# Patient Record
Sex: FEMALE | Race: OTHER | Employment: PART TIME | URBAN - METROPOLITAN AREA
[De-identification: names, ages, dates, MRNs, and addresses within clinical notes are randomized per-mention and may not be internally consistent; named-entity substitution may affect disease eponyms.]

---

## 2021-04-12 ENCOUNTER — OFFICE VISIT (OUTPATIENT)
Dept: FAMILY MEDICINE CLINIC | Age: 26
End: 2021-04-12
Payer: COMMERCIAL

## 2021-04-12 VITALS
TEMPERATURE: 98.1 F | SYSTOLIC BLOOD PRESSURE: 132 MMHG | DIASTOLIC BLOOD PRESSURE: 76 MMHG | RESPIRATION RATE: 16 BRPM | HEART RATE: 94 BPM | BODY MASS INDEX: 50.02 KG/M2 | WEIGHT: 293 LBS | HEIGHT: 64 IN | OXYGEN SATURATION: 96 %

## 2021-04-12 DIAGNOSIS — M25.571 RIGHT ANKLE PAIN, UNSPECIFIED CHRONICITY: ICD-10-CM

## 2021-04-12 DIAGNOSIS — J30.9 ALLERGIC RHINITIS, UNSPECIFIED SEASONALITY, UNSPECIFIED TRIGGER: ICD-10-CM

## 2021-04-12 DIAGNOSIS — J45.909 UNCOMPLICATED ASTHMA, UNSPECIFIED ASTHMA SEVERITY, UNSPECIFIED WHETHER PERSISTENT: ICD-10-CM

## 2021-04-12 DIAGNOSIS — G43.909 MIGRAINE WITHOUT STATUS MIGRAINOSUS, NOT INTRACTABLE, UNSPECIFIED MIGRAINE TYPE: ICD-10-CM

## 2021-04-12 DIAGNOSIS — E28.2 PCOS (POLYCYSTIC OVARIAN SYNDROME): ICD-10-CM

## 2021-04-12 DIAGNOSIS — Z00.00 ROUTINE HEALTH MAINTENANCE: Primary | ICD-10-CM

## 2021-04-12 PROCEDURE — 99204 OFFICE O/P NEW MOD 45 MIN: CPT | Performed by: STUDENT IN AN ORGANIZED HEALTH CARE EDUCATION/TRAINING PROGRAM

## 2021-04-12 PROCEDURE — G8427 DOCREV CUR MEDS BY ELIG CLIN: HCPCS | Performed by: STUDENT IN AN ORGANIZED HEALTH CARE EDUCATION/TRAINING PROGRAM

## 2021-04-12 PROCEDURE — 1036F TOBACCO NON-USER: CPT | Performed by: STUDENT IN AN ORGANIZED HEALTH CARE EDUCATION/TRAINING PROGRAM

## 2021-04-12 PROCEDURE — G8417 CALC BMI ABV UP PARAM F/U: HCPCS | Performed by: STUDENT IN AN ORGANIZED HEALTH CARE EDUCATION/TRAINING PROGRAM

## 2021-04-12 RX ORDER — TOPIRAMATE 25 MG/1
25 TABLET ORAL NIGHTLY
Qty: 60 TABLET | Refills: 3 | Status: SHIPPED
Start: 2021-04-12 | End: 2021-06-14 | Stop reason: SDUPTHER

## 2021-04-12 RX ORDER — FLUTICASONE PROPIONATE 50 MCG
2 SPRAY, SUSPENSION (ML) NASAL DAILY
Qty: 3 BOTTLE | Refills: 1 | Status: SHIPPED
Start: 2021-04-12 | End: 2022-03-14

## 2021-04-12 RX ORDER — NORETHINDRONE ACETATE AND ETHINYL ESTRADIOL 1MG-20(21)
1 KIT ORAL DAILY
Qty: 1 PACKET | Refills: 3 | Status: SHIPPED
Start: 2021-04-12 | End: 2021-11-08

## 2021-04-12 SDOH — HEALTH STABILITY: MENTAL HEALTH: HOW MANY STANDARD DRINKS CONTAINING ALCOHOL DO YOU HAVE ON A TYPICAL DAY?: NOT ASKED

## 2021-04-12 ASSESSMENT — ENCOUNTER SYMPTOMS
ABDOMINAL PAIN: 0
SORE THROAT: 0
BACK PAIN: 0
EYE REDNESS: 0
NAUSEA: 0
DIARRHEA: 0
RHINORRHEA: 0
COUGH: 0
SINUS PRESSURE: 0
VOMITING: 0
SINUS PAIN: 0
CONSTIPATION: 0
SHORTNESS OF BREATH: 0
EYE PAIN: 0

## 2021-04-12 ASSESSMENT — PATIENT HEALTH QUESTIONNAIRE - PHQ9
6. FEELING BAD ABOUT YOURSELF - OR THAT YOU ARE A FAILURE OR HAVE LET YOURSELF OR YOUR FAMILY DOWN: 2
1. LITTLE INTEREST OR PLEASURE IN DOING THINGS: 2
9. THOUGHTS THAT YOU WOULD BE BETTER OFF DEAD, OR OF HURTING YOURSELF: 0
SUM OF ALL RESPONSES TO PHQ QUESTIONS 1-9: 16
5. POOR APPETITE OR OVEREATING: 2
SUM OF ALL RESPONSES TO PHQ9 QUESTIONS 1 & 2: 4
2. FEELING DOWN, DEPRESSED OR HOPELESS: 2
4. FEELING TIRED OR HAVING LITTLE ENERGY: 3

## 2021-04-12 ASSESSMENT — COLUMBIA-SUICIDE SEVERITY RATING SCALE - C-SSRS
6. HAVE YOU EVER DONE ANYTHING, STARTED TO DO ANYTHING, OR PREPARED TO DO ANYTHING TO END YOUR LIFE?: NO
2. HAVE YOU ACTUALLY HAD ANY THOUGHTS OF KILLING YOURSELF?: NO

## 2021-04-12 NOTE — PROGRESS NOTES
2021    Jose R Allan (:  1995) is a 31 y.o. female, here for evaluation of the following medical concerns:    HPI  Chief Complaint   Patient presents with    New Patient     last period 10/2020 to last of March without period    Migraine    Asthma    Joint Swelling     right ankle, hard to put weight on      Headache  Patient presents for evaluation of headache. Symptoms began about several years ago. Generally, the headaches last about a few days and occur 2-3 times a month migraines but gets headaches everyday. The headaches do not seem to be related to any time of the day. The headaches are usually pounding, sharp, squeezing and throbbing and are located in feels it in her face and sometimes all around her head. The patient rates her most severe headaches a 10 on a scale from 1 to 10. Recently, the headaches have been increasing in both severity and frequency. Work attendance or other daily activities are affected by the headaches. Precipitating factors include: light and stress. The headaches are usually preceded by an aura consisting of visual field loss. Associated neurologic symptoms: decreased physical activity, dizziness, speech difficulties, vision problems and worsening school/work performance. The patient denies numbness of extremities. Home treatment has included darkening the room, resting and sleeping with little improvement. Other history includes: migraine headaches diagnosed in the past. Patient was on cymbalta and amitriptyline. She states that she feels better without the cymbalta. She was told her next step was topamax. Will start the medication. Ankle Pain  Patient complains of right ankle pain. Onset of the symptoms was several months ago. Inciting event: none known. Current symptoms include: ability to bear weight, but with some pain, pain with inversion of the foot and swelling.  Aggravating factors: direct pressure, going up and down stairs, standing, walking and weight bearing. Symptoms have been random and cannot predict a pattern. Patient has had prior ankle problems. Previous visits for this problem: none. Evaluation to date: none. Treatment to date: avoidance of offending activity and sock with a brace. She has issues with tendonitis in her arch. She had insoles for her shoes but it does not feel the same. She cannot put weight on it. Patient had a period for 7 months. Was put on birth control. Told she has PCOS and she ran out of birth control due to lack of insurance. We can prescribe this. Last period was 2 weeks ago. It last longer than usual but it has stopped. Patient has asthma and would like to be worked up for this. Had been told in the past to use albuterol everyday but discussed this is not how we use these inhalers. She had a different inhaler but unsure the name, she will let us know what it is if she can find it. Review of Systems   Constitutional: Negative for chills, fatigue and fever. HENT: Negative for congestion, ear pain, postnasal drip, rhinorrhea, sinus pressure, sinus pain and sore throat. Eyes: Negative for pain and redness. Respiratory: Negative for cough and shortness of breath. Cardiovascular: Negative for chest pain. Gastrointestinal: Negative for abdominal pain, constipation, diarrhea, nausea and vomiting. Genitourinary: Negative for difficulty urinating and dysuria. Musculoskeletal: Positive for arthralgias and joint swelling. Negative for back pain, myalgias and neck pain. Skin: Negative for rash. Neurological: Positive for headaches. Negative for dizziness, light-headedness and numbness. Psychiatric/Behavioral: The patient is not nervous/anxious. Prior to Visit Medications    Medication Sig Taking?  Authorizing Provider   topiramate (TOPAMAX) 25 MG tablet Take 1 tablet by mouth nightly Yes Janette Escalante,    norethindrone-ethinyl estradiol (LOESTRIN FE 1/20) 1-20 MG-MCG per tablet Take 1 tablet by mouth daily Yes Janette Escalante DO   fluticasone (FLONASE) 50 MCG/ACT nasal spray 2 sprays by Each Nostril route daily Yes Janette Escalante DO        Allergies   Allergen Reactions    Bee Venom Swelling       History reviewed. No pertinent past medical history. History reviewed. No pertinent surgical history. Social History     Socioeconomic History    Marital status: Single     Spouse name: Not on file    Number of children: Not on file    Years of education: Not on file    Highest education level: Not on file   Occupational History    Not on file   Social Needs    Financial resource strain: Not on file    Food insecurity     Worry: Not on file     Inability: Not on file    Transportation needs     Medical: Not on file     Non-medical: Not on file   Tobacco Use    Smoking status: Never Smoker    Smokeless tobacco: Never Used   Substance and Sexual Activity    Alcohol use: Yes    Drug use: Never    Sexual activity: Not on file   Lifestyle    Physical activity     Days per week: Not on file     Minutes per session: Not on file    Stress: Not on file   Relationships    Social connections     Talks on phone: Not on file     Gets together: Not on file     Attends Catholic service: Not on file     Active member of club or organization: Not on file     Attends meetings of clubs or organizations: Not on file     Relationship status: Not on file    Intimate partner violence     Fear of current or ex partner: Not on file     Emotionally abused: Not on file     Physically abused: Not on file     Forced sexual activity: Not on file   Other Topics Concern    Not on file   Social History Narrative    Not on file        History reviewed. No pertinent family history.         Vitals:    04/12/21 1259   BP: 132/76   Pulse: 94   Resp: 16   Temp: 98.1 °F (36.7 °C)   TempSrc: Oral   SpO2: 96%   Weight: (!) 304 lb 3.2 oz (138 kg)   Height: 5' 3.5\" (1.613 m)     Estimated body mass index is 53.04 kg/m² as calculated from the following:    Height as of this encounter: 5' 3.5\" (1.613 m). Weight as of this encounter: 304 lb 3.2 oz (138 kg). Physical Exam  Vitals signs and nursing note reviewed. Constitutional:       Appearance: Normal appearance. HENT:      Head: Normocephalic and atraumatic. Right Ear: Tympanic membrane, ear canal and external ear normal.      Left Ear: Tympanic membrane, ear canal and external ear normal.      Nose: Nose normal.      Mouth/Throat:      Mouth: Mucous membranes are moist.      Pharynx: Oropharynx is clear. Eyes:      Extraocular Movements: Extraocular movements intact. Conjunctiva/sclera: Conjunctivae normal.      Pupils: Pupils are equal, round, and reactive to light. Neck:      Musculoskeletal: Normal range of motion and neck supple. Cardiovascular:      Rate and Rhythm: Normal rate and regular rhythm. Pulses: Normal pulses. Heart sounds: Normal heart sounds. Pulmonary:      Effort: Pulmonary effort is normal.      Breath sounds: Normal breath sounds. Abdominal:      General: Bowel sounds are normal.      Palpations: Abdomen is soft. Musculoskeletal: Normal range of motion. General: Swelling (right ankle medial side) present. Skin:     General: Skin is warm and dry. Capillary Refill: Capillary refill takes less than 2 seconds. Neurological:      General: No focal deficit present. Mental Status: She is alert and oriented to person, place, and time. Psychiatric:         Mood and Affect: Mood normal.         Behavior: Behavior normal.         Thought Content: Thought content normal.         ASSESSMENT/PLAN:  Samira was seen today for new patient, migraine, asthma and joint swelling. Diagnoses and all orders for this visit:    Routine health maintenance    Right ankle pain, unspecified chronicity  -     XR ANKLE RIGHT (2 VIEWS);  Future    Migraine without status migrainosus, not intractable, unspecified migraine 2 weeks (around 4/26/2021) for f/u ankle and migraines. An  electronic signature was used to authenticate this note.     --Read DO Gillian on 4/12/2021 at 1:33 PM

## 2021-04-30 ENCOUNTER — APPOINTMENT (OUTPATIENT)
Dept: GENERAL RADIOLOGY | Age: 26
End: 2021-04-30
Payer: COMMERCIAL

## 2021-04-30 ENCOUNTER — HOSPITAL ENCOUNTER (EMERGENCY)
Age: 26
Discharge: HOME | End: 2021-04-30
Attending: EMERGENCY MEDICINE
Payer: COMMERCIAL

## 2021-04-30 VITALS
TEMPERATURE: 97.7 F | HEART RATE: 81 BPM | HEIGHT: 64 IN | SYSTOLIC BLOOD PRESSURE: 141 MMHG | OXYGEN SATURATION: 96 % | WEIGHT: 293 LBS | BODY MASS INDEX: 50.02 KG/M2 | RESPIRATION RATE: 17 BRPM | DIASTOLIC BLOOD PRESSURE: 79 MMHG

## 2021-04-30 DIAGNOSIS — M79.10 MYALGIA: ICD-10-CM

## 2021-04-30 DIAGNOSIS — R06.02 SHORTNESS OF BREATH: Primary | ICD-10-CM

## 2021-04-30 DIAGNOSIS — R53.83 FATIGUE, UNSPECIFIED TYPE: ICD-10-CM

## 2021-04-30 DIAGNOSIS — T50.Z95A ADVERSE EFFECT OF VACCINE, INITIAL ENCOUNTER: ICD-10-CM

## 2021-04-30 LAB
ALBUMIN SERPL-MCNC: 4.4 G/DL (ref 3.5–5.2)
ALP BLD-CCNC: 91 U/L (ref 35–104)
ALT SERPL-CCNC: 45 U/L (ref 0–32)
ANION GAP SERPL CALCULATED.3IONS-SCNC: 10 MMOL/L (ref 7–16)
AST SERPL-CCNC: 28 U/L (ref 0–31)
BASOPHILS ABSOLUTE: 0.05 E9/L (ref 0–0.2)
BASOPHILS RELATIVE PERCENT: 0.5 % (ref 0–2)
BILIRUB SERPL-MCNC: <0.2 MG/DL (ref 0–1.2)
BUN BLDV-MCNC: 11 MG/DL (ref 6–20)
CALCIUM SERPL-MCNC: 9.1 MG/DL (ref 8.6–10.2)
CHLORIDE BLD-SCNC: 102 MMOL/L (ref 98–107)
CO2: 25 MMOL/L (ref 22–29)
CREAT SERPL-MCNC: 0.8 MG/DL (ref 0.5–1)
D DIMER: <200 NG/ML DDU
EOSINOPHILS ABSOLUTE: 0.18 E9/L (ref 0.05–0.5)
EOSINOPHILS RELATIVE PERCENT: 1.8 % (ref 0–6)
GFR AFRICAN AMERICAN: >60
GFR NON-AFRICAN AMERICAN: >60 ML/MIN/1.73
GLUCOSE BLD-MCNC: 153 MG/DL (ref 74–99)
HCG, URINE, POC: NEGATIVE
HCT VFR BLD CALC: 41.2 % (ref 34–48)
HEMOGLOBIN: 13.3 G/DL (ref 11.5–15.5)
IMMATURE GRANULOCYTES #: 0.03 E9/L
IMMATURE GRANULOCYTES %: 0.3 % (ref 0–5)
INR BLD: 1
LACTIC ACID: 2 MMOL/L (ref 0.5–2.2)
LYMPHOCYTES ABSOLUTE: 3.12 E9/L (ref 1.5–4)
LYMPHOCYTES RELATIVE PERCENT: 32.1 % (ref 20–42)
Lab: NORMAL
MCH RBC QN AUTO: 27.1 PG (ref 26–35)
MCHC RBC AUTO-ENTMCNC: 32.3 % (ref 32–34.5)
MCV RBC AUTO: 83.9 FL (ref 80–99.9)
MONOCYTES ABSOLUTE: 0.68 E9/L (ref 0.1–0.95)
MONOCYTES RELATIVE PERCENT: 7 % (ref 2–12)
NEGATIVE QC PASS/FAIL: NORMAL
NEUTROPHILS ABSOLUTE: 5.67 E9/L (ref 1.8–7.3)
NEUTROPHILS RELATIVE PERCENT: 58.3 % (ref 43–80)
PDW BLD-RTO: 13.6 FL (ref 11.5–15)
PLATELET # BLD: 347 E9/L (ref 130–450)
PMV BLD AUTO: 10.3 FL (ref 7–12)
POSITIVE QC PASS/FAIL: NORMAL
POTASSIUM REFLEX MAGNESIUM: 3.9 MMOL/L (ref 3.5–5)
PRO-BNP: <5 PG/ML (ref 0–125)
PROTHROMBIN TIME: 11.2 SEC (ref 9.3–12.4)
RBC # BLD: 4.91 E12/L (ref 3.5–5.5)
SARS-COV-2, NAAT: NOT DETECTED
SODIUM BLD-SCNC: 137 MMOL/L (ref 132–146)
TOTAL PROTEIN: 7.1 G/DL (ref 6.4–8.3)
TROPONIN: <0.01 NG/ML (ref 0–0.03)
WBC # BLD: 9.7 E9/L (ref 4.5–11.5)

## 2021-04-30 PROCEDURE — 85378 FIBRIN DEGRADE SEMIQUANT: CPT

## 2021-04-30 PROCEDURE — 85610 PROTHROMBIN TIME: CPT

## 2021-04-30 PROCEDURE — 99283 EMERGENCY DEPT VISIT LOW MDM: CPT

## 2021-04-30 PROCEDURE — 83605 ASSAY OF LACTIC ACID: CPT

## 2021-04-30 PROCEDURE — 84484 ASSAY OF TROPONIN QUANT: CPT

## 2021-04-30 PROCEDURE — 87635 SARS-COV-2 COVID-19 AMP PRB: CPT

## 2021-04-30 PROCEDURE — 85025 COMPLETE CBC W/AUTO DIFF WBC: CPT

## 2021-04-30 PROCEDURE — 83880 ASSAY OF NATRIURETIC PEPTIDE: CPT

## 2021-04-30 PROCEDURE — 80053 COMPREHEN METABOLIC PANEL: CPT

## 2021-04-30 PROCEDURE — 71045 X-RAY EXAM CHEST 1 VIEW: CPT

## 2021-04-30 RX ORDER — IBUPROFEN 800 MG/1
800 TABLET ORAL EVERY 8 HOURS PRN
Qty: 21 TABLET | Refills: 0 | Status: SHIPPED | OUTPATIENT
Start: 2021-04-30 | End: 2021-06-14 | Stop reason: ALTCHOICE

## 2021-04-30 RX ORDER — ALBUTEROL SULFATE 90 UG/1
2 AEROSOL, METERED RESPIRATORY (INHALATION) EVERY 4 HOURS PRN
Qty: 1 INHALER | Refills: 1 | Status: SHIPPED | OUTPATIENT
Start: 2021-04-30 | End: 2021-10-18 | Stop reason: SDUPTHER

## 2021-04-30 ASSESSMENT — ENCOUNTER SYMPTOMS
COLOR CHANGE: 0
ABDOMINAL PAIN: 0
DIARRHEA: 0
NAUSEA: 0
SINUS PAIN: 0
SHORTNESS OF BREATH: 1
SINUS PRESSURE: 0

## 2021-05-01 NOTE — ED PROVIDER NOTES
Yue Johnston is a 31 y.o. female presenting to the ED for shortness of breath, body aches, beginning today ago. The complaint has been intermittent, moderate in severity, and worsened by nothing. Patient is 50-year-old female who notes body aches fatigue some shortness of breath since having her Covid vaccine 2 days ago. She denies any diarrhea denies any loss of taste or smell denies any cough sore throat denies any nausea vomiting or diarrhea denies any abdominal pain. She does note a mild headache. She denies any chest pain back pain or flank pain. Denies any UTI symptoms. Review of Systems:   Review of Systems   Constitutional: Negative for fatigue and fever. HENT: Negative for congestion, sinus pressure, sinus pain and sneezing. Eyes: Negative for visual disturbance. Respiratory: Positive for shortness of breath. Cardiovascular: Negative for chest pain, palpitations and leg swelling. Gastrointestinal: Negative for abdominal pain, diarrhea and nausea. Endocrine: Negative for polyphagia and polyuria. Genitourinary: Negative for difficulty urinating and flank pain. Musculoskeletal: Positive for myalgias. Skin: Negative for color change and pallor. Neurological: Negative for dizziness, light-headedness and headaches. Hematological: Negative for adenopathy. Does not bruise/bleed easily. Psychiatric/Behavioral: Negative for agitation. The patient is not nervous/anxious.                  --------------------------------------------- PAST HISTORY ---------------------------------------------  Past Medical History:  has a past medical history of Asthma. Past Surgical History:  has no past surgical history on file. Social History:  reports that she has never smoked. She has never used smokeless tobacco. She reports current alcohol use. She reports that she does not use drugs. Family History: family history is not on file.      The patients home medications have been reviewed.     Allergies: Bee venom    -------------------------------------------------- RESULTS -------------------------------------------------  All laboratory and radiology results have been personally reviewed by myself   LABS:  Results for orders placed or performed during the hospital encounter of 04/30/21   COVID-19, Rapid    Specimen: Nasopharyngeal Swab   Result Value Ref Range    SARS-CoV-2, NAAT Not Detected Not Detected   CBC Auto Differential   Result Value Ref Range    WBC 9.7 4.5 - 11.5 E9/L    RBC 4.91 3.50 - 5.50 E12/L    Hemoglobin 13.3 11.5 - 15.5 g/dL    Hematocrit 41.2 34.0 - 48.0 %    MCV 83.9 80.0 - 99.9 fL    MCH 27.1 26.0 - 35.0 pg    MCHC 32.3 32.0 - 34.5 %    RDW 13.6 11.5 - 15.0 fL    Platelets 225 944 - 449 E9/L    MPV 10.3 7.0 - 12.0 fL    Neutrophils % 58.3 43.0 - 80.0 %    Immature Granulocytes % 0.3 0.0 - 5.0 %    Lymphocytes % 32.1 20.0 - 42.0 %    Monocytes % 7.0 2.0 - 12.0 %    Eosinophils % 1.8 0.0 - 6.0 %    Basophils % 0.5 0.0 - 2.0 %    Neutrophils Absolute 5.67 1.80 - 7.30 E9/L    Immature Granulocytes # 0.03 E9/L    Lymphocytes Absolute 3.12 1.50 - 4.00 E9/L    Monocytes Absolute 0.68 0.10 - 0.95 E9/L    Eosinophils Absolute 0.18 0.05 - 0.50 E9/L    Basophils Absolute 0.05 0.00 - 0.20 E9/L   Comprehensive Metabolic Panel w/ Reflex to MG   Result Value Ref Range    Sodium 137 132 - 146 mmol/L    Potassium reflex Magnesium 3.9 3.5 - 5.0 mmol/L    Chloride 102 98 - 107 mmol/L    CO2 25 22 - 29 mmol/L    Anion Gap 10 7 - 16 mmol/L    Glucose 153 (H) 74 - 99 mg/dL    BUN 11 6 - 20 mg/dL    CREATININE 0.8 0.5 - 1.0 mg/dL    GFR Non-African American >60 >=60 mL/min/1.73    GFR African American >60     Calcium 9.1 8.6 - 10.2 mg/dL    Total Protein 7.1 6.4 - 8.3 g/dL    Albumin 4.4 3.5 - 5.2 g/dL    Total Bilirubin <0.2 0.0 - 1.2 mg/dL    Alkaline Phosphatase 91 35 - 104 U/L    ALT 45 (H) 0 - 32 U/L    AST 28 0 - 31 U/L   Lactic Acid, Plasma   Result Value Ref Range    Lactic Normal range of motion and neck supple. No muscular tenderness. Cardiovascular:      Rate and Rhythm: Normal rate and regular rhythm. Pulses: Normal pulses. Heart sounds: No murmur. Pulmonary:      Effort: Pulmonary effort is normal. No accessory muscle usage or respiratory distress. Breath sounds: No decreased breath sounds, wheezing or rhonchi. Chest:      Chest wall: No tenderness. Abdominal:      General: Bowel sounds are normal.      Tenderness: There is no abdominal tenderness. There is no right CVA tenderness, left CVA tenderness or guarding. Musculoskeletal: Normal range of motion. General: No swelling or deformity. Right lower leg: She exhibits no tenderness. No edema. Left lower leg: She exhibits no tenderness. No edema. Skin:     General: Skin is warm and dry. Capillary Refill: Capillary refill takes less than 2 seconds. Neurological:      General: No focal deficit present. Mental Status: She is alert and oriented to person, place, and time. Psychiatric:         Mood and Affect: Mood normal.             ------------------------------ ED COURSE/MEDICAL DECISION MAKING----------------------  Medications - No data to display      ED COURSE:       Medical Decision Making:    D-dimer was negative. All blood work was unremarkable. Chest x-ray was clear. Patient likely having side effects from Covid vaccine. She will be discharged to follow-up with her PCP in 2 to 3 days. We discussed warning signs for when to return. Risks and benefits were discussed with patient for All medications dispensed and given in department as well prescriptions prescribed for home, . The patient elected to take the medicine. Pt instructed on warning signs and precautions for medication side effects. The patient was given warning signs for when to seek medical attention.        Counseled regarding todays diagnosis, including possible risks and complications,  especially if left uncontrolled. Counseled regarding the possible side effects, risks, benefits and alternatives to treatment; patient and/or guardian verbalizes understanding, agrees, feels comfortable with and wishes to proceed with treatment plan. Advised patient to call her primary care physician with any new medication issues, and read all Rx info from pharmacy to assure aware of all possible risks and side effects of medication before taking. I did discuss warning signs for when to return to the Emergency Room, and the patient verbalized understanding      Counseling: The emergency provider has spoken with the patient and discussed todays results, in addition to providing specific details for the plan of care and counseling regarding the diagnosis and prognosis. Questions are answered at this time and they are agreeable with the plan.      --------------------------------- IMPRESSION AND DISPOSITION ---------------------------------    IMPRESSION  1. Shortness of breath    2. Adverse effect of vaccine, initial encounter    3. Myalgia    4. Fatigue, unspecified type        DISPOSITION  Disposition: Discharge to home  Patient condition is fair      NOTE: This report was transcribed using voice recognition software.  Every effort was made to ensure accuracy; however, inadvertent computerized transcription errors may be present       Abril Stahl DO  04/30/21 7025

## 2021-06-14 ENCOUNTER — TELEPHONE (OUTPATIENT)
Dept: FAMILY MEDICINE CLINIC | Age: 26
End: 2021-06-14

## 2021-06-14 ENCOUNTER — OFFICE VISIT (OUTPATIENT)
Dept: FAMILY MEDICINE CLINIC | Age: 26
End: 2021-06-14
Payer: COMMERCIAL

## 2021-06-14 VITALS
HEIGHT: 65 IN | DIASTOLIC BLOOD PRESSURE: 72 MMHG | SYSTOLIC BLOOD PRESSURE: 110 MMHG | HEART RATE: 70 BPM | OXYGEN SATURATION: 98 % | RESPIRATION RATE: 18 BRPM | WEIGHT: 291.6 LBS | TEMPERATURE: 98.1 F | BODY MASS INDEX: 48.58 KG/M2

## 2021-06-14 DIAGNOSIS — G43.909 MIGRAINE WITHOUT STATUS MIGRAINOSUS, NOT INTRACTABLE, UNSPECIFIED MIGRAINE TYPE: ICD-10-CM

## 2021-06-14 DIAGNOSIS — R06.83 SNORING: ICD-10-CM

## 2021-06-14 DIAGNOSIS — H61.23 BILATERAL IMPACTED CERUMEN: ICD-10-CM

## 2021-06-14 DIAGNOSIS — J01.10 ACUTE NON-RECURRENT FRONTAL SINUSITIS: Primary | ICD-10-CM

## 2021-06-14 PROCEDURE — G8427 DOCREV CUR MEDS BY ELIG CLIN: HCPCS | Performed by: STUDENT IN AN ORGANIZED HEALTH CARE EDUCATION/TRAINING PROGRAM

## 2021-06-14 PROCEDURE — 1036F TOBACCO NON-USER: CPT | Performed by: STUDENT IN AN ORGANIZED HEALTH CARE EDUCATION/TRAINING PROGRAM

## 2021-06-14 PROCEDURE — 99213 OFFICE O/P EST LOW 20 MIN: CPT | Performed by: STUDENT IN AN ORGANIZED HEALTH CARE EDUCATION/TRAINING PROGRAM

## 2021-06-14 PROCEDURE — G8417 CALC BMI ABV UP PARAM F/U: HCPCS | Performed by: STUDENT IN AN ORGANIZED HEALTH CARE EDUCATION/TRAINING PROGRAM

## 2021-06-14 RX ORDER — TOPIRAMATE 25 MG/1
TABLET ORAL
Qty: 60 TABLET | Refills: 3 | Status: SHIPPED
Start: 2021-06-14 | End: 2021-10-18

## 2021-06-14 RX ORDER — BENZONATATE 200 MG/1
CAPSULE ORAL
COMMUNITY
Start: 2021-06-07 | End: 2021-08-19 | Stop reason: ALTCHOICE

## 2021-06-14 RX ORDER — ALBUTEROL SULFATE 1.25 MG/3ML
SOLUTION RESPIRATORY (INHALATION)
COMMUNITY
Start: 2021-06-07 | End: 2022-08-19 | Stop reason: SDUPTHER

## 2021-06-14 RX ORDER — AZITHROMYCIN 250 MG/1
TABLET, FILM COATED ORAL
COMMUNITY
Start: 2021-06-07 | End: 2021-06-14 | Stop reason: ALTCHOICE

## 2021-06-14 RX ORDER — METHYLPREDNISOLONE 4 MG/1
TABLET ORAL
COMMUNITY
Start: 2021-06-07 | End: 2021-08-19 | Stop reason: ALTCHOICE

## 2021-06-14 SDOH — ECONOMIC STABILITY: FOOD INSECURITY: WITHIN THE PAST 12 MONTHS, THE FOOD YOU BOUGHT JUST DIDN'T LAST AND YOU DIDN'T HAVE MONEY TO GET MORE.: NEVER TRUE

## 2021-06-14 SDOH — ECONOMIC STABILITY: FOOD INSECURITY: WITHIN THE PAST 12 MONTHS, YOU WORRIED THAT YOUR FOOD WOULD RUN OUT BEFORE YOU GOT MONEY TO BUY MORE.: NEVER TRUE

## 2021-06-14 ASSESSMENT — ENCOUNTER SYMPTOMS
BACK PAIN: 0
ABDOMINAL PAIN: 0
WHEEZING: 1
DIARRHEA: 0
RHINORRHEA: 0
SHORTNESS OF BREATH: 1
SORE THROAT: 1
NAUSEA: 0
SINUS PRESSURE: 1
COUGH: 1
CONSTIPATION: 0
EYE PAIN: 0
VOMITING: 0
SINUS PAIN: 1
EYE REDNESS: 0

## 2021-06-14 ASSESSMENT — SOCIAL DETERMINANTS OF HEALTH (SDOH): HOW HARD IS IT FOR YOU TO PAY FOR THE VERY BASICS LIKE FOOD, HOUSING, MEDICAL CARE, AND HEATING?: NOT HARD AT ALL

## 2021-06-14 NOTE — PROGRESS NOTES
DO Chani   norethindrone-ethinyl estradiol (LOESTRIN FE 1/20) 1-20 MG-MCG per tablet Take 1 tablet by mouth daily Yes Janette Escalante DO   fluticasone (FLONASE) 50 MCG/ACT nasal spray 2 sprays by Each Nostril route daily Yes Janette Escalante DO        Allergies   Allergen Reactions    Bee Venom Swelling       Past Medical History:   Diagnosis Date    Asthma        History reviewed. No pertinent surgical history. Social History     Socioeconomic History    Marital status: Single     Spouse name: Not on file    Number of children: Not on file    Years of education: Not on file    Highest education level: Not on file   Occupational History    Not on file   Tobacco Use    Smoking status: Never Smoker    Smokeless tobacco: Never Used   Substance and Sexual Activity    Alcohol use: Yes    Drug use: Never    Sexual activity: Not on file   Other Topics Concern    Not on file   Social History Narrative    Not on file     Social Determinants of Health     Financial Resource Strain: Low Risk     Difficulty of Paying Living Expenses: Not hard at all   Food Insecurity: No Food Insecurity    Worried About Running Out of Food in the Last Year: Never true    [address] in the Last Year: Never true   Transportation Needs:     Lack of Transportation (Medical):  Lack of Transportation (Non-Medical):    Physical Activity:     Days of Exercise per Week:     Minutes of Exercise per Session:    Stress:     Feeling of Stress :    Social Connections:     Frequency of Communication with Friends and Family:     Frequency of Social Gatherings with Friends and Family:     Attends Zoroastrian Services:     Active Member of Clubs or Organizations:     Attends Club or Organization Meetings:     Marital Status:    Intimate Partner Violence:     Fear of Current or Ex-Partner:     Emotionally Abused:     Physically Abused:     Sexually Abused:         History reviewed.  No pertinent family soft.   Musculoskeletal:         General: Normal range of motion. Cervical back: Normal range of motion and neck supple. Skin:     General: Skin is warm and dry. Capillary Refill: Capillary refill takes less than 2 seconds. Neurological:      General: No focal deficit present. Mental Status: She is alert and oriented to person, place, and time. Psychiatric:         Mood and Affect: Mood normal.         Behavior: Behavior normal.         Thought Content: Thought content normal.         ASSESSMENT/PLAN:  Samira was seen today for otalgia, sleep apnea, cough and nutrition counseling. Diagnoses and all orders for this visit:    Acute non-recurrent frontal sinusitis    Snoring  -     [facility]  -     Sleep Study with PAP Titration; Future    Bilateral impacted cerumen  -     carbamide peroxide (DEBROX) 6.5 % otic solution; Place 5 drops into both ears 2 times daily    Migraine without status migrainosus, not intractable, unspecified migraine type  -     topiramate (TOPAMAX) 25 MG tablet; Take 1 tablet in morning and night     will increase the topamax dose  Will get sleep study for patient   Will clean out ears at next visit     Educational materials and/or home exercises printed for patient's review and were included in patient instructions on his/her After Visit Summary and given to patient at the end of visit. Counseled regarding above diagnosis, including possible risks and complications,  especially if left uncontrolled. Counseled regarding the possible side effects, risks, benefits and alternatives to treatment; patient and/or guardian verbalizes understanding, agrees, feels comfortable with and wishes to proceed with above treatment plan. Advised patient to call with any new medication issues, and read all Rx info from pharmacy to assure aware of all possible risks and side effects of medication before taking.      Reviewed age and gender appropriate health screening exams and vaccinations. Advised patient regarding importance of keeping up with recommended health maintenance and to schedule as soon as possible if overdue, as this is important in assessing for undiagnosed pathology, especially cancer, as well as protecting against potentially harmful/life threatening disease. Patient and/or guardian verbalizes understanding and agrees with above counseling, assessment and plan. All questions answered. Return in about 1 week (around 6/21/2021) for f/u earwax and headaches. An  electronic signature was used to authenticate this note.     --Shalonda Hawkins DO on 6/14/2021 at 3:31 PM

## 2021-06-23 ENCOUNTER — TELEPHONE (OUTPATIENT)
Dept: SLEEP CENTER | Age: 26
End: 2021-06-23

## 2021-06-25 ENCOUNTER — TELEPHONE (OUTPATIENT)
Dept: SLEEP CENTER | Age: 26
End: 2021-06-25

## 2021-06-30 DIAGNOSIS — G47.33 OSA (OBSTRUCTIVE SLEEP APNEA): Primary | ICD-10-CM

## 2021-07-12 ENCOUNTER — OFFICE VISIT (OUTPATIENT)
Dept: FAMILY MEDICINE CLINIC | Age: 26
End: 2021-07-12
Payer: COMMERCIAL

## 2021-07-12 VITALS
SYSTOLIC BLOOD PRESSURE: 118 MMHG | BODY MASS INDEX: 48.82 KG/M2 | TEMPERATURE: 97.1 F | HEART RATE: 80 BPM | HEIGHT: 65 IN | RESPIRATION RATE: 16 BRPM | DIASTOLIC BLOOD PRESSURE: 86 MMHG | OXYGEN SATURATION: 97 % | WEIGHT: 293 LBS

## 2021-07-12 DIAGNOSIS — R06.83 SNORING: Primary | ICD-10-CM

## 2021-07-12 DIAGNOSIS — G47.00 INSOMNIA, UNSPECIFIED TYPE: ICD-10-CM

## 2021-07-12 PROCEDURE — G8417 CALC BMI ABV UP PARAM F/U: HCPCS | Performed by: STUDENT IN AN ORGANIZED HEALTH CARE EDUCATION/TRAINING PROGRAM

## 2021-07-12 PROCEDURE — 1036F TOBACCO NON-USER: CPT | Performed by: STUDENT IN AN ORGANIZED HEALTH CARE EDUCATION/TRAINING PROGRAM

## 2021-07-12 PROCEDURE — G8427 DOCREV CUR MEDS BY ELIG CLIN: HCPCS | Performed by: STUDENT IN AN ORGANIZED HEALTH CARE EDUCATION/TRAINING PROGRAM

## 2021-07-12 PROCEDURE — 99213 OFFICE O/P EST LOW 20 MIN: CPT | Performed by: STUDENT IN AN ORGANIZED HEALTH CARE EDUCATION/TRAINING PROGRAM

## 2021-07-12 RX ORDER — TRAZODONE HYDROCHLORIDE 50 MG/1
50 TABLET ORAL NIGHTLY
Qty: 90 TABLET | Refills: 1 | Status: SHIPPED
Start: 2021-07-12 | End: 2022-01-12

## 2021-07-12 ASSESSMENT — ENCOUNTER SYMPTOMS
NAUSEA: 0
COUGH: 0
DIARRHEA: 0
CONSTIPATION: 0
SINUS PRESSURE: 0
VOMITING: 0
EYE PAIN: 0
EYE REDNESS: 0
RHINORRHEA: 0
SORE THROAT: 0
SINUS PAIN: 0
ABDOMINAL PAIN: 0
BACK PAIN: 0
SHORTNESS OF BREATH: 0

## 2021-07-12 NOTE — PROGRESS NOTES
2021    Rafael Aponte (:  1995) is a 31 y.o. female, here for evaluation of the following medical concerns:    HPI  Chief Complaint   Patient presents with    Insomnia     Ongoing for the past couple of years. Scheduled for a sleep study in August    Ankle Pain     Pain on and off since April. She did not get the x-ray done      Patient is here for a follow up appointment because she missed her last appointment. She states that the debrox did help loosen up some of the wax. She wants us to check it and see how it is going. She spoke with the sleep study place about a week ago and they told her to discuss with me about her not being able to fall asleep. They told her this would be an issue with the sleep study that they scheduled for her. Lately she is able to sleep in because she is not working. She does not get to sleep until about 3-4 AM but can sleep in. When she wakes up she is not feeling rested and as if she did not sleep and has a headache. Her girlfriend states she snores and gasps for air. She has the sleep study scheduled for . She has tried melatonin but this does not help. Patient has gone back down to one pill on the topamax. Review of Systems   Constitutional: Negative for chills, fatigue and fever. HENT: Negative for congestion, ear pain, postnasal drip, rhinorrhea, sinus pressure, sinus pain and sore throat. Itchy ears    Eyes: Negative for pain and redness. Respiratory: Negative for cough and shortness of breath. Cardiovascular: Negative for chest pain. Gastrointestinal: Negative for abdominal pain, constipation, diarrhea, nausea and vomiting. Genitourinary: Negative for difficulty urinating and dysuria. Musculoskeletal: Positive for arthralgias. Negative for back pain, myalgias and neck pain. Skin: Negative for rash. Neurological: Negative for dizziness, light-headedness and numbness.    Psychiatric/Behavioral: Positive for sleep disturbance. The patient is not nervous/anxious. Prior to Visit Medications    Medication Sig Taking? Authorizing Provider   traZODone (DESYREL) 50 MG tablet Take 1 tablet by mouth nightly Yes Janette Escalante DO   albuterol (ACCUNEB) 1.25 MG/3ML nebulizer solution INHALE 3 ML (1 VIAL) VIA NEBULIZER EVERY 4 TO 6 HOURS AS NEEDED Yes Historical Provider, MD   carbamide peroxide (DEBROX) 6.5 % otic solution Place 5 drops into both ears 2 times daily Yes Janette Escalante DO   topiramate (TOPAMAX) 25 MG tablet Take 1 tablet in morning and night Yes Janette Escalante DO   albuterol sulfate HFA (PROVENTIL HFA) 108 (90 Base) MCG/ACT inhaler Inhale 2 puffs into the lungs every 4 hours as needed for Wheezing Yes Shree Wilde DO   norethindrone-ethinyl estradiol (LOESTRIN FE 1/20) 1-20 MG-MCG per tablet Take 1 tablet by mouth daily Yes Janette Escalante DO   fluticasone (FLONASE) 50 MCG/ACT nasal spray 2 sprays by Each Nostril route daily Yes Janette Escalante DO   benzonatate (TESSALON) 200 MG capsule TAKE ONE CAPSULE BY MOUTH THREE TIMES A DAY AS NEEDED  Patient not taking: Reported on 7/12/2021  Historical Provider, MD   methylPREDNISolone (MEDROL DOSEPACK) 4 MG tablet take for 6 days as directed  Patient not taking: Reported on 7/12/2021  Historical Provider, MD        Allergies   Allergen Reactions    Bee Venom Swelling       Past Medical History:   Diagnosis Date    Asthma        History reviewed. No pertinent surgical history. Social History     Socioeconomic History    Marital status: Single     Spouse name: Not on file    Number of children: Not on file    Years of education: Not on file    Highest education level: Not on file   Occupational History    Not on file   Tobacco Use    Smoking status: Never Smoker    Smokeless tobacco: Never Used   Substance and Sexual Activity    Alcohol use:  Yes    Drug use: Never    Sexual activity: Not on file   Other Topics Concern    Not on file   Social History Narrative    Not on file     Social Determinants of Health     Financial Resource Strain: Low Risk     Difficulty of Paying Living Expenses: Not hard at all   Food Insecurity: No Food Insecurity    Worried About Running Out of Food in the Last Year: Never true    [address] in the Last Year: Never true   Transportation Needs:     Lack of Transportation (Medical):  Lack of Transportation (Non-Medical):    Physical Activity:     Days of Exercise per Week:     Minutes of Exercise per Session:    Stress:     Feeling of Stress :    Social Connections:     Frequency of Communication with Friends and Family:     Frequency of Social Gatherings with Friends and Family:     Attends [employer] Services:     Active Member of Clubs or Organizations:     Attends Club or Organization Meetings:     Marital Status:    Intimate Partner Violence:     Fear of Current or Ex-Partner:     Emotionally Abused:     Physically Abused:     Sexually Abused:         History reviewed. No pertinent family history. Vitals:    07/12/21 1559   BP: 118/86   Pulse: 80   Resp: 16   Temp: 97.1 °F (36.2 °C)   TempSrc: Temporal   SpO2: 97%   Weight: 298 lb 12.8 oz (135.5 kg)   Height: 5' 5\" (1.651 m)     Estimated body mass index is 49.72 kg/m² as calculated from the following:    Height as of this encounter: 5' 5\" (1.651 m). Weight as of this encounter: 298 lb 12.8 oz (135.5 kg).     Most Recent Labs  CBC  Lab Results   Component Value Date    WBC 9.7 04/30/2021    RBC 4.91 04/30/2021    HGB 13.3 04/30/2021    HCT 41.2 04/30/2021    MCV 83.9 04/30/2021     04/30/2021      CMP  Lab Results   Component Value Date     04/30/2021    K 3.9 04/30/2021     04/30/2021    CO2 25 04/30/2021    ANIONGAP 10 04/30/2021    GLUCOSE 153 04/30/2021    BUN 11 04/30/2021    CREATININE 0.8 04/30/2021    LABGLOM >60 04/30/2021    GFRAA >60 04/30/2021    CALCIUM 9.1 04/30/2021    PROT 7.1 04/30/2021    LABALBU 4.4 04/30/2021    BILITOT <0.2 04/30/2021    ALKPHOS 91 04/30/2021    AST 28 04/30/2021    ALT 45 04/30/2021       Physical Exam  Vitals and nursing note reviewed. Constitutional:       Appearance: Normal appearance. She is obese. HENT:      Head: Normocephalic and atraumatic. Right Ear: Tympanic membrane, ear canal and external ear normal.      Left Ear: Tympanic membrane, ear canal and external ear normal.      Nose: Nose normal.      Mouth/Throat:      Mouth: Mucous membranes are moist.      Pharynx: Oropharynx is clear. Eyes:      Extraocular Movements: Extraocular movements intact. Conjunctiva/sclera: Conjunctivae normal.      Pupils: Pupils are equal, round, and reactive to light. Cardiovascular:      Rate and Rhythm: Normal rate and regular rhythm. Pulses: Normal pulses. Heart sounds: Normal heart sounds. Pulmonary:      Effort: Pulmonary effort is normal.      Breath sounds: Normal breath sounds. Abdominal:      General: Bowel sounds are normal.      Palpations: Abdomen is soft. Musculoskeletal:         General: Normal range of motion. Cervical back: Normal range of motion and neck supple. Skin:     General: Skin is warm and dry. Capillary Refill: Capillary refill takes less than 2 seconds. Neurological:      General: No focal deficit present. Mental Status: She is alert and oriented to person, place, and time. Psychiatric:         Mood and Affect: Mood normal.         Behavior: Behavior normal.         Thought Content: Thought content normal.         ASSESSMENT/PLAN:  Samira was seen today for insomnia and ankle pain. Diagnoses and all orders for this visit:    Snoring    Insomnia, unspecified type  -     traZODone (DESYREL) 50 MG tablet;  Take 1 tablet by mouth nightly    Patient getting a sleep study in august      Educational materials and/or home exercises printed for patient's review and were included in patient instructions on his/her After Visit

## 2021-07-14 ENCOUNTER — TELEPHONE (OUTPATIENT)
Dept: ADMINISTRATIVE | Age: 26
End: 2021-07-14

## 2021-07-14 ENCOUNTER — APPOINTMENT (OUTPATIENT)
Dept: CT IMAGING | Age: 26
End: 2021-07-14
Payer: COMMERCIAL

## 2021-07-14 ENCOUNTER — APPOINTMENT (OUTPATIENT)
Dept: GENERAL RADIOLOGY | Age: 26
End: 2021-07-14
Payer: COMMERCIAL

## 2021-07-14 ENCOUNTER — HOSPITAL ENCOUNTER (EMERGENCY)
Age: 26
Discharge: HOME | End: 2021-07-14
Attending: EMERGENCY MEDICINE
Payer: COMMERCIAL

## 2021-07-14 ENCOUNTER — NURSE TRIAGE (OUTPATIENT)
Dept: OTHER | Facility: CLINIC | Age: 26
End: 2021-07-14

## 2021-07-14 VITALS
RESPIRATION RATE: 18 BRPM | HEIGHT: 65 IN | OXYGEN SATURATION: 100 % | DIASTOLIC BLOOD PRESSURE: 68 MMHG | BODY MASS INDEX: 48.32 KG/M2 | HEART RATE: 77 BPM | WEIGHT: 290 LBS | TEMPERATURE: 98.1 F | SYSTOLIC BLOOD PRESSURE: 120 MMHG

## 2021-07-14 DIAGNOSIS — G43.809 OTHER MIGRAINE WITHOUT STATUS MIGRAINOSUS, NOT INTRACTABLE: Primary | ICD-10-CM

## 2021-07-14 LAB
ALBUMIN SERPL-MCNC: 4.5 G/DL (ref 3.5–5.2)
ALP BLD-CCNC: 98 U/L (ref 35–104)
ALT SERPL-CCNC: 54 U/L (ref 0–32)
AMORPHOUS: ABNORMAL
ANION GAP SERPL CALCULATED.3IONS-SCNC: 14 MMOL/L (ref 7–16)
AST SERPL-CCNC: 40 U/L (ref 0–31)
BACTERIA: ABNORMAL /HPF
BASOPHILS ABSOLUTE: 0.03 E9/L (ref 0–0.2)
BASOPHILS RELATIVE PERCENT: 0.4 % (ref 0–2)
BILIRUB SERPL-MCNC: 0.3 MG/DL (ref 0–1.2)
BILIRUBIN URINE: NEGATIVE
BLOOD, URINE: NEGATIVE
BUN BLDV-MCNC: 9 MG/DL (ref 6–20)
C-REACTIVE PROTEIN: 0.6 MG/DL (ref 0–0.4)
CALCIUM SERPL-MCNC: 9.9 MG/DL (ref 8.6–10.2)
CHLORIDE BLD-SCNC: 103 MMOL/L (ref 98–107)
CLARITY: CLEAR
CO2: 24 MMOL/L (ref 22–29)
COLOR: YELLOW
CREAT SERPL-MCNC: 0.8 MG/DL (ref 0.5–1)
EOSINOPHILS ABSOLUTE: 0.12 E9/L (ref 0.05–0.5)
EOSINOPHILS RELATIVE PERCENT: 1.7 % (ref 0–6)
EPITHELIAL CELLS, UA: ABNORMAL /HPF
GFR AFRICAN AMERICAN: >60
GFR NON-AFRICAN AMERICAN: >60 ML/MIN/1.73
GLUCOSE BLD-MCNC: 96 MG/DL (ref 74–99)
GLUCOSE URINE: NEGATIVE MG/DL
HCG, URINE, POC: NEGATIVE
HCT VFR BLD CALC: 44.6 % (ref 34–48)
HEMOGLOBIN: 14.4 G/DL (ref 11.5–15.5)
IMMATURE GRANULOCYTES #: 0.03 E9/L
IMMATURE GRANULOCYTES %: 0.4 % (ref 0–5)
KETONES, URINE: NEGATIVE MG/DL
LACTIC ACID, SEPSIS: 1.7 MMOL/L (ref 0.5–1.9)
LEUKOCYTE ESTERASE, URINE: ABNORMAL
LYMPHOCYTES ABSOLUTE: 2.89 E9/L (ref 1.5–4)
LYMPHOCYTES RELATIVE PERCENT: 40.3 % (ref 20–42)
Lab: NORMAL
MCH RBC QN AUTO: 27.4 PG (ref 26–35)
MCHC RBC AUTO-ENTMCNC: 32.3 % (ref 32–34.5)
MCV RBC AUTO: 84.8 FL (ref 80–99.9)
MONOCYTES ABSOLUTE: 0.47 E9/L (ref 0.1–0.95)
MONOCYTES RELATIVE PERCENT: 6.6 % (ref 2–12)
NEGATIVE QC PASS/FAIL: NORMAL
NEUTROPHILS ABSOLUTE: 3.63 E9/L (ref 1.8–7.3)
NEUTROPHILS RELATIVE PERCENT: 50.6 % (ref 43–80)
NITRITE, URINE: NEGATIVE
PDW BLD-RTO: 14 FL (ref 11.5–15)
PH UA: 7.5 (ref 5–9)
PLATELET # BLD: 385 E9/L (ref 130–450)
PMV BLD AUTO: 10.2 FL (ref 7–12)
POSITIVE QC PASS/FAIL: NORMAL
POTASSIUM REFLEX MAGNESIUM: 4.3 MMOL/L (ref 3.5–5)
PROTEIN UA: NEGATIVE MG/DL
RBC # BLD: 5.26 E12/L (ref 3.5–5.5)
RBC UA: ABNORMAL /HPF (ref 0–2)
SARS-COV-2, NAAT: NOT DETECTED
SEDIMENTATION RATE, ERYTHROCYTE: 15 MM/HR (ref 0–20)
SODIUM BLD-SCNC: 141 MMOL/L (ref 132–146)
SPECIFIC GRAVITY UA: 1.02 (ref 1–1.03)
TOTAL PROTEIN: 7.9 G/DL (ref 6.4–8.3)
UROBILINOGEN, URINE: 0.2 E.U./DL
WBC # BLD: 7.2 E9/L (ref 4.5–11.5)
WBC UA: ABNORMAL /HPF (ref 0–5)

## 2021-07-14 PROCEDURE — 96375 TX/PRO/DX INJ NEW DRUG ADDON: CPT

## 2021-07-14 PROCEDURE — 87635 SARS-COV-2 COVID-19 AMP PRB: CPT

## 2021-07-14 PROCEDURE — 86140 C-REACTIVE PROTEIN: CPT

## 2021-07-14 PROCEDURE — 85025 COMPLETE CBC W/AUTO DIFF WBC: CPT

## 2021-07-14 PROCEDURE — 83605 ASSAY OF LACTIC ACID: CPT

## 2021-07-14 PROCEDURE — 81001 URINALYSIS AUTO W/SCOPE: CPT

## 2021-07-14 PROCEDURE — 2580000003 HC RX 258: Performed by: STUDENT IN AN ORGANIZED HEALTH CARE EDUCATION/TRAINING PROGRAM

## 2021-07-14 PROCEDURE — 87040 BLOOD CULTURE FOR BACTERIA: CPT

## 2021-07-14 PROCEDURE — 6370000000 HC RX 637 (ALT 250 FOR IP): Performed by: STUDENT IN AN ORGANIZED HEALTH CARE EDUCATION/TRAINING PROGRAM

## 2021-07-14 PROCEDURE — 71045 X-RAY EXAM CHEST 1 VIEW: CPT

## 2021-07-14 PROCEDURE — 70450 CT HEAD/BRAIN W/O DYE: CPT

## 2021-07-14 PROCEDURE — 85651 RBC SED RATE NONAUTOMATED: CPT

## 2021-07-14 PROCEDURE — 96374 THER/PROPH/DIAG INJ IV PUSH: CPT

## 2021-07-14 PROCEDURE — 6360000002 HC RX W HCPCS: Performed by: STUDENT IN AN ORGANIZED HEALTH CARE EDUCATION/TRAINING PROGRAM

## 2021-07-14 PROCEDURE — 80053 COMPREHEN METABOLIC PANEL: CPT

## 2021-07-14 PROCEDURE — 99285 EMERGENCY DEPT VISIT HI MDM: CPT

## 2021-07-14 RX ORDER — ACETAMINOPHEN 500 MG
1000 TABLET ORAL ONCE
Status: COMPLETED | OUTPATIENT
Start: 2021-07-14 | End: 2021-07-14

## 2021-07-14 RX ORDER — METOCLOPRAMIDE HYDROCHLORIDE 5 MG/ML
10 INJECTION INTRAMUSCULAR; INTRAVENOUS ONCE
Status: COMPLETED | OUTPATIENT
Start: 2021-07-14 | End: 2021-07-14

## 2021-07-14 RX ORDER — DIPHENHYDRAMINE HYDROCHLORIDE 50 MG/ML
25 INJECTION INTRAMUSCULAR; INTRAVENOUS ONCE
Status: COMPLETED | OUTPATIENT
Start: 2021-07-14 | End: 2021-07-14

## 2021-07-14 RX ORDER — 0.9 % SODIUM CHLORIDE 0.9 %
1000 INTRAVENOUS SOLUTION INTRAVENOUS ONCE
Status: COMPLETED | OUTPATIENT
Start: 2021-07-14 | End: 2021-07-14

## 2021-07-14 RX ADMIN — DIPHENHYDRAMINE HYDROCHLORIDE 25 MG: 50 INJECTION, SOLUTION INTRAMUSCULAR; INTRAVENOUS at 15:09

## 2021-07-14 RX ADMIN — ACETAMINOPHEN 1000 MG: 500 TABLET ORAL at 15:07

## 2021-07-14 RX ADMIN — SODIUM CHLORIDE 1000 ML: 9 INJECTION, SOLUTION INTRAVENOUS at 15:08

## 2021-07-14 RX ADMIN — METOCLOPRAMIDE 10 MG: 5 INJECTION, SOLUTION INTRAMUSCULAR; INTRAVENOUS at 15:09

## 2021-07-14 ASSESSMENT — ENCOUNTER SYMPTOMS
ABDOMINAL DISTENTION: 0
EYE REDNESS: 0
BACK PAIN: 0
SORE THROAT: 0
EYE DISCHARGE: 0
WHEEZING: 0
PHOTOPHOBIA: 1
SINUS PRESSURE: 0
VOMITING: 0
NAUSEA: 0
EYE PAIN: 0
COUGH: 0
DIARRHEA: 0
SHORTNESS OF BREATH: 0

## 2021-07-14 ASSESSMENT — VISUAL ACUITY
OU: 20/20
OU: 20/20
OS: 20/200
OS: 20/200
OD: 20/30
OD: 20/30

## 2021-07-14 ASSESSMENT — PAIN SCALES - GENERAL: PAINLEVEL_OUTOF10: 2

## 2021-07-14 NOTE — ED PROVIDER NOTES
[facility]  Department of Emergency Medicine     Written by: Anna Ramírez DO  Patient Name: Shelly Clay  Attending Provider: Soraida Yanez DO  Admit Date: 2021  2:25 PM  MRN: [id number]                   : 1995        Chief Complaint   Patient presents with    Migraine     has blurred vision    Fever    - Chief complaint    Patient is a 29-year-old female past medical history migraines. Patient presents with chief complaint of headache. She states that symptoms began 2 days ago. She describes a headache as a generalized aching and pulling sensation. She currently rates pain a 9 out of 10. In addition she also notes severe photophobia as well as neck stiffness and nausea. Patient also notes that she has had subjective fevers at home. Patient has tried Tylenol with minimal improvement. Patient states that headache is somewhat similar to her previous headaches however this 1 appears to be lasting longer and is more severe. Patient denies any chest pain, shortness of breath abdominal pain, constipation, diarrhea, dizziness or lightheadedness. The history is provided by the patient. No  was used. Review of Systems   Constitutional: Positive for fever. Negative for chills. HENT: Negative for ear pain, sinus pressure and sore throat. Eyes: Positive for photophobia. Negative for pain, discharge and redness. Respiratory: Negative for cough, shortness of breath and wheezing. Cardiovascular: Negative for chest pain. Gastrointestinal: Negative for abdominal distention, diarrhea, nausea and vomiting. Genitourinary: Negative for dysuria and frequency. Musculoskeletal: Negative for arthralgias and back pain. Skin: Negative for rash and wound. Neurological: Positive for headaches. Negative for dizziness, seizures, weakness, light-headedness and numbness. Hematological: Negative for adenopathy.    All other systems reviewed 99.3.  On physical exam heart regular rate and rhythm, lungs clear to auscultation bilaterally, abdomen soft nontender. On neurological exam, there is no focal deficits, no nuchal rigidity noted, pupils equal round reactive to light, extraocular eye movements intact, muscle strength 5 out of 5 to bilateral upper and lower extremities, sensation intact to light touch. Knute Pro work obtained CBC unremarkable, CMP unremarkable, urinalysis not indicative of infection, lactic acid 1.7, CRP 0.6, urine pregnancy test negative, COVID-19 test was obtained and was negative. CT scan of the head was obtained contrast, chest x-ray obtained demonstrate no acute normalities. Patient given 1 L normal saline, 10 mg IV Reglan, 25 mg IV Benadryl and 1 L normal saline. With improvement in headache. Findings discussed at length with patient after shared decision making decision was made not to pursue LP due to low suspicion for bacterial meningitis at this time. Decision made to discharge patient. Patient was given strict return precautions and was instructed to follow-up with primary care doctor soon as possible. In addition if patient notes any new worrisome symptoms she was instructed to return to emergency department for evaluation. Plan of care discussed with patient including discharge, all questions were answered, patient was in agreement with plan of care and discharged home in stable condition. Amount and/or Complexity of Data Reviewed  Clinical lab tests: ordered and reviewed  Tests in the radiology section of CPT®: ordered and reviewed    Risk of Complications, Morbidity, and/or Mortality  Presenting problems: moderate  Diagnostic procedures: moderate  Management options: moderate    Patient Progress  Patient progress: stable       ED Course as of Jul 14 1717 Wed Jul 14, 2021 1712 Patient reevaluated notes improvement in headache. Discussed findings with patient.   At this time patient would not like to proceed with lumbar puncture at this time. Had shared decision-making discussion with patient that we cannot definitively rule out meningitis LP but given clinical findings as well as physical exam meningitis is of low suspicion. Decision was made to discharge patient strict return precautions. Patient was instructed to follow-up with primary care doctor and to return to emergency department if any new worrisome symptoms. [BP]      ED Course User Index  [BP] Meka Stanley DO        --------------------------------------------- PAST HISTORY ---------------------------------------------  Past Medical History:  has a past medical history of Asthma, Kidney stone, and Migraines. Past Surgical History:  has no past surgical history on file. Social History:  reports that she has never smoked. She has never used smokeless tobacco. She reports current alcohol use. She reports that she does not use drugs. Family History: family history is not on file. The patients home medications have been reviewed.     Allergies: Bee venom    -------------------------------------------------- RESULTS -------------------------------------------------  Labs:  Results for orders placed or performed during the hospital encounter of 07/14/21   COVID-19, Rapid    Specimen: Nasopharyngeal Swab   Result Value Ref Range    SARS-CoV-2, NAAT Not Detected Not Detected   CBC Auto Differential   Result Value Ref Range    WBC 7.2 4.5 - 11.5 E9/L    RBC 5.26 3.50 - 5.50 E12/L    Hemoglobin 14.4 11.5 - 15.5 g/dL    Hematocrit 44.6 34.0 - 48.0 %    MCV 84.8 80.0 - 99.9 fL    MCH 27.4 26.0 - 35.0 pg    MCHC 32.3 32.0 - 34.5 %    RDW 14.0 11.5 - 15.0 fL    Platelets 291 880 - 044 E9/L    MPV 10.2 7.0 - 12.0 fL    Neutrophils % 50.6 43.0 - 80.0 %    Immature Granulocytes % 0.4 0.0 - 5.0 %    Lymphocytes % 40.3 20.0 - 42.0 %    Monocytes % 6.6 2.0 - 12.0 %    Eosinophils % 1.7 0.0 - 6.0 %    Basophils % 0.4 0.0 - 2.0 %    Neutrophils Absolute 3.63 1.80 - 7.30 E9/L    Immature Granulocytes # 0.03 E9/L    Lymphocytes Absolute 2.89 1.50 - 4.00 E9/L    Monocytes Absolute 0.47 0.10 - 0.95 E9/L    Eosinophils Absolute 0.12 0.05 - 0.50 E9/L    Basophils Absolute 0.03 0.00 - 0.20 E9/L   Comprehensive Metabolic Panel w/ Reflex to MG   Result Value Ref Range    Sodium 141 132 - 146 mmol/L    Potassium reflex Magnesium 4.3 3.5 - 5.0 mmol/L    Chloride 103 98 - 107 mmol/L    CO2 24 22 - 29 mmol/L    Anion Gap 14 7 - 16 mmol/L    Glucose 96 74 - 99 mg/dL    BUN 9 6 - 20 mg/dL    CREATININE 0.8 0.5 - 1.0 mg/dL    GFR Non-African American >60 >=60 mL/min/1.73    GFR African American >60     Calcium 9.9 8.6 - 10.2 mg/dL    Total Protein 7.9 6.4 - 8.3 g/dL    Albumin 4.5 3.5 - 5.2 g/dL    Total Bilirubin 0.3 0.0 - 1.2 mg/dL    Alkaline Phosphatase 98 35 - 104 U/L    ALT 54 (H) 0 - 32 U/L    AST 40 (H) 0 - 31 U/L   Urinalysis, reflex to microscopic   Result Value Ref Range    Color, UA Yellow Straw/Yellow    Clarity, UA Clear Clear    Glucose, Ur Negative Negative mg/dL    Bilirubin Urine Negative Negative    Ketones, Urine Negative Negative mg/dL    Specific Gravity, UA 1.025 1.005 - 1.030    Blood, Urine Negative Negative    pH, UA 7.5 5.0 - 9.0    Protein, UA Negative Negative mg/dL    Urobilinogen, Urine 0.2 <2.0 E.U./dL    Nitrite, Urine Negative Negative    Leukocyte Esterase, Urine SMALL (A) Negative   Lactate, Sepsis   Result Value Ref Range    Lactic Acid, Sepsis 1.7 0.5 - 1.9 mmol/L   Microscopic Urinalysis   Result Value Ref Range    WBC, UA 2-5 0 - 5 /HPF    RBC, UA NONE 0 - 2 /HPF    Epithelial Cells, UA MANY /HPF    Bacteria, UA FEW (A) None Seen /HPF    Amorphous, UA MODERATE    POC Pregnancy Urine   Result Value Ref Range    HCG, Urine, POC Negative Negative    Lot Number [id number]     Positive QC Pass/Fail Pass     Negative QC Pass/Fail Pass        Radiology:  XR CHEST PORTABLE   Final Result   No acute process.          CT Head WO Contrast   Final Result   No acute intracranial abnormality.             ------------------------- NURSING NOTES AND VITALS REVIEWED ---------------------------  Date / Time Roomed:  7/14/2021  2:25 PM  ED Bed Assignment:  27/27    The nursing notes within the ED encounter and vital signs as below have been reviewed. /68   Pulse 77   Temp 98.1 °F (36.7 °C)   Resp 18   Ht 5' 5\" (1.651 m)   Wt 290 lb (131.5 kg)   SpO2 100%   BMI 48.26 kg/m²   Oxygen Saturation Interpretation: Normal      ------------------------------------------ PROGRESS NOTES ------------------------------------------  5:17 PM EDT  I have spoken with the patient and discussed todays results, in addition to providing specific details for the plan of care and counseling regarding the diagnosis and prognosis. Their questions are answered at this time and they are agreeable with the plan. I discussed at length with them reasons for immediate return here for re evaluation. They will followup with their primary care physician by calling their office tomorrow. --------------------------------- ADDITIONAL PROVIDER NOTES ---------------------------------  At this time the patient is without objective evidence of an acute process requiring hospitalization or inpatient management. They have remained hemodynamically stable throughout their entire ED visit and are stable for discharge with outpatient follow-up. The plan has been discussed in detail and they are aware of the specific conditions for emergent return, as well as the importance of follow-up. New Prescriptions    No medications on file       Diagnosis:  1. Other migraine without status migrainosus, not intractable        Disposition:  Patient's disposition: Discharge to home  Patient's condition is stable. Patient was seen and evaluated by myself and my attending Mac Jernigan DO.  Assessment and Plan discussed with attending provider, please see attestation for final plan of care.     Elizebeth Bongo, DO Jerone Cushing,   Resident  07/14/21 [id number]

## 2021-07-14 NOTE — TELEPHONE ENCOUNTER
Pt's partner called and said pt has a migraine with visual disturbances and vision is coming and going. She has had it since this morning. Pt is present. Warm transfer to nurse access Yomaira Husain).

## 2021-07-14 NOTE — ED TRIAGE NOTES
FIRST PROVIDER CONTACT ASSESSMENT NOTE      Department of Emergency Medicine   Admit Date: No admission date for patient encounter. Chief Complaint: Migraine (has blurred vision) and Fever      History of Present Illness:    Frank Head is a 31 y.o. female who presents to the ED for recurrent headaches. Hx of migraines. Getting worse and more frequent. Recent fevers. N/V. Photophobic. Topiramate not working.   Sent in by PCP.        -----------------END OF FIRST PROVIDER CONTACT ASSESSMENT NOTE--------------  Electronically signed by MICHI Rodriguez   DD: 7/14/21

## 2021-07-14 NOTE — TELEPHONE ENCOUNTER
Received call from [facility] at [facility] with [employer] Complaint. Brief description of triage: Pt with severe migraine that started this morning. Pain in front and back of head. Pain currently an 8/10. Has nausea and blurry vision. Took Topamax last night and tylenol this morning. Has history of migraines. Triage indicates for patient to call back from PCP within 1 hour. Care advice provided, patient verbalizes understanding; denies any other questions or concerns; instructed to call back for any new or worsening symptoms. Writer provided warm transfer to PCP office. Attention Provider: Thank you for allowing me to participate in the care of your patient. The patient was connected to triage in response to information provided to the ECC. Please do not respond through this encounter as the response is not directed to a shared pool. Reason for Disposition   SEVERE headache (e.g., excruciating) and has had severe headaches before    Answer Assessment - Initial Assessment Questions  1. LOCATION: \"Where does it hurt? \"       Pain located in front and back, radiating down to neck    2. ONSET: \"When did the headache start? \" (Minutes, hours or days)   Headache started this morning    3. PATTERN: \"Does the pain come and go, or has it been constant since it started? \"   pain has been constant    4. SEVERITY: \"How bad is the pain? \" and \"What does it keep you from doing? \"  (e.g., Scale 1-10; mild, moderate, or severe)    - MILD (1-3): doesn't interfere with normal activities     - MODERATE (4-7): interferes with normal activities or awakens from sleep     - SEVERE (8-10): excruciating pain, unable to do any normal activities      right now is an 8/10    5. RECURRENT SYMPTOM: \"Have you ever had headaches before? \" If so, ask: \"When was the last time? \" and \"What happened that time?\"        6. CAUSE: \"What do you think is causing the headache? \"    Has a migraine    7.  MIGRAINE: \"Have you been

## 2021-07-14 NOTE — TELEPHONE ENCOUNTER
Talked to patients partner, could hear patient talking in the backround, patient having nausea, migraine with light sensitivity and vision issues. Per , patient to go to the ER. Patient partner verbalized understanding.

## 2021-07-19 ENCOUNTER — OFFICE VISIT (OUTPATIENT)
Dept: FAMILY MEDICINE CLINIC | Age: 26
End: 2021-07-19
Payer: COMMERCIAL

## 2021-07-19 VITALS
SYSTOLIC BLOOD PRESSURE: 108 MMHG | WEIGHT: 293 LBS | BODY MASS INDEX: 48.82 KG/M2 | TEMPERATURE: 98.5 F | HEART RATE: 83 BPM | DIASTOLIC BLOOD PRESSURE: 74 MMHG | HEIGHT: 65 IN | RESPIRATION RATE: 16 BRPM | OXYGEN SATURATION: 99 %

## 2021-07-19 DIAGNOSIS — R74.01 TRANSAMINITIS: Primary | ICD-10-CM

## 2021-07-19 DIAGNOSIS — G43.909 MIGRAINE WITHOUT STATUS MIGRAINOSUS, NOT INTRACTABLE, UNSPECIFIED MIGRAINE TYPE: ICD-10-CM

## 2021-07-19 LAB — BLOOD CULTURE, ROUTINE: NORMAL

## 2021-07-19 PROCEDURE — G8417 CALC BMI ABV UP PARAM F/U: HCPCS | Performed by: STUDENT IN AN ORGANIZED HEALTH CARE EDUCATION/TRAINING PROGRAM

## 2021-07-19 PROCEDURE — 99213 OFFICE O/P EST LOW 20 MIN: CPT | Performed by: STUDENT IN AN ORGANIZED HEALTH CARE EDUCATION/TRAINING PROGRAM

## 2021-07-19 PROCEDURE — 1036F TOBACCO NON-USER: CPT | Performed by: STUDENT IN AN ORGANIZED HEALTH CARE EDUCATION/TRAINING PROGRAM

## 2021-07-19 PROCEDURE — G8427 DOCREV CUR MEDS BY ELIG CLIN: HCPCS | Performed by: STUDENT IN AN ORGANIZED HEALTH CARE EDUCATION/TRAINING PROGRAM

## 2021-07-19 ASSESSMENT — ENCOUNTER SYMPTOMS
ABDOMINAL PAIN: 0
CONSTIPATION: 0
NAUSEA: 0
SORE THROAT: 0
COUGH: 0
RHINORRHEA: 0
BACK PAIN: 0
SINUS PRESSURE: 0
VOMITING: 0
EYE PAIN: 0
EYE REDNESS: 0
SINUS PAIN: 0
DIARRHEA: 0
SHORTNESS OF BREATH: 0

## 2021-07-19 NOTE — PROGRESS NOTES
2021    Rey Kwok (:  1995) is a 31 y.o. female, here for evaluation of the following medical concerns:    HPI  Chief Complaint   Patient presents with    Migraine     Was in the ER on .  Discuss Labs     Would like to discuss lab work that was done in the ER on . Patient was seen in the ER on the  due to having a headache. She went in because her blurry vision. They did do a full work up on her as if she possibly had meningitis. I reviewed the blood work. All within normal limits except transaminitis and barely elevated CRP. CT head negative. CXR negative. Blood cultures did not show any growth. She had liver enzymes elevated in the past and was told it was just fatty liver. Her mother had cirrhosis of the liver and did die. Patient does not drink very much. Review of Systems   Constitutional: Negative for chills, fatigue and fever. HENT: Negative for congestion, ear pain, postnasal drip, rhinorrhea, sinus pressure, sinus pain and sore throat. Eyes: Negative for pain and redness. Respiratory: Negative for cough and shortness of breath. Cardiovascular: Negative for chest pain. Gastrointestinal: Negative for abdominal pain, constipation, diarrhea, nausea and vomiting. Genitourinary: Negative for difficulty urinating and dysuria. Musculoskeletal: Negative for back pain, myalgias and neck pain. Skin: Negative for rash. Neurological: Positive for headaches. Negative for dizziness, light-headedness and numbness. Psychiatric/Behavioral: The patient is not nervous/anxious. Prior to Visit Medications    Medication Sig Taking?  Authorizing Provider   traZODone (DESYREL) 50 MG tablet Take 1 tablet by mouth nightly Yes Janette Escalante DO   albuterol (ACCUNEB) 1.25 MG/3ML nebulizer solution INHALE 3 ML (1 VIAL) VIA NEBULIZER EVERY 4 TO 6 HOURS AS NEEDED Yes Historical Provider, MD   topiramate (TOPAMAX) 25 MG tablet Take 1 tablet in morning and night  Patient taking differently: Take 1 tablet at night Yes Janette Escalante DO   albuterol sulfate HFA (PROVENTIL HFA) 108 (90 Base) MCG/ACT inhaler Inhale 2 puffs into the lungs every 4 hours as needed for Wheezing Yes Inez Hernandez DO   norethindrone-ethinyl estradiol (LOESTRIN FE 1/20) 1-20 MG-MCG per tablet Take 1 tablet by mouth daily Yes Janette Escalante DO   fluticasone (FLONASE) 50 MCG/ACT nasal spray 2 sprays by Each Nostril route daily Yes Janette Escalante DO   benzonatate (TESSALON) 200 MG capsule TAKE ONE CAPSULE BY MOUTH THREE TIMES A DAY AS NEEDED  Patient not taking: Reported on 7/12/2021  Historical Provider, MD   methylPREDNISolone (MEDROL DOSEPACK) 4 MG tablet take for 6 days as directed  Patient not taking: Reported on 7/12/2021  Historical Provider, MD        Allergies   Allergen Reactions    Bee Venom Swelling       Past Medical History:   Diagnosis Date    Asthma     Kidney stone     Migraines        History reviewed. No pertinent surgical history. Social History     Socioeconomic History    Marital status: Single     Spouse name: Not on file    Number of children: Not on file    Years of education: Not on file    Highest education level: Not on file   Occupational History    Not on file   Tobacco Use    Smoking status: Never Smoker    Smokeless tobacco: Never Used   Substance and Sexual Activity    Alcohol use: Yes    Drug use: Never    Sexual activity: Not on file   Other Topics Concern    Not on file   Social History Narrative    Not on file     Social Determinants of Health     Financial Resource Strain: Low Risk     Difficulty of Paying Living Expenses: Not hard at all   Food Insecurity: No Food Insecurity    Worried About Running Out of Food in the Last Year: Never true    [address] in the Last Year: Never true   Transportation Needs:     Lack of Transportation (Medical):      Lack of Transportation (Non-Medical):    Physical Activity:     Days of Exercise per Week:     Minutes of Exercise per Session:    Stress:     Feeling of Stress :    Social Connections:     Frequency of Communication with Friends and Family:     Frequency of Social Gatherings with Friends and Family:     Attends [employer] Services:     Active Member of Clubs or Organizations:     Attends Club or Organization Meetings:     Marital Status:    Intimate Partner Violence:     Fear of Current or Ex-Partner:     Emotionally Abused:     Physically Abused:     Sexually Abused:         History reviewed. No pertinent family history. Vitals:    07/19/21 1526   BP: 108/74   Pulse: 83   Resp: 16   Temp: 98.5 °F (36.9 °C)   TempSrc: Oral   SpO2: 99%   Weight: 297 lb 12.8 oz (135.1 kg)   Height: 5' 5\" (1.651 m)     Estimated body mass index is 49.56 kg/m² as calculated from the following:    Height as of this encounter: 5' 5\" (1.651 m). Weight as of this encounter: 297 lb 12.8 oz (135.1 kg).     Most Recent Labs  CBC  Lab Results   Component Value Date    WBC 7.2 07/14/2021    WBC 9.7 04/30/2021    RBC 5.26 07/14/2021    RBC 4.91 04/30/2021    HGB 14.4 07/14/2021    HGB 13.3 04/30/2021    HCT 44.6 07/14/2021    HCT 41.2 04/30/2021    MCV 84.8 07/14/2021    MCV 83.9 04/30/2021     07/14/2021     04/30/2021      CMP  Lab Results   Component Value Date     07/14/2021     04/30/2021    K 4.3 07/14/2021    K 3.9 04/30/2021     07/14/2021     04/30/2021    CO2 24 07/14/2021    CO2 25 04/30/2021    ANIONGAP 14 07/14/2021    ANIONGAP 10 04/30/2021    GLUCOSE 96 07/14/2021    GLUCOSE 153 04/30/2021    BUN 9 07/14/2021    BUN 11 04/30/2021    CREATININE 0.8 07/14/2021    CREATININE 0.8 04/30/2021    LABGLOM >60 07/14/2021    LABGLOM >60 04/30/2021    GFRAA >60 07/14/2021    GFRAA >60 04/30/2021    CALCIUM 9.9 07/14/2021    CALCIUM 9.1 04/30/2021    PROT 7.9 07/14/2021    PROT 7.1 04/30/2021    LABALBU 4.5 07/14/2021    LABALBU 4.4 04/30/2021    BILITOT 0.3 07/14/2021    BILITOT <0.2 04/30/2021    ALKPHOS 98 07/14/2021    ALKPHOS 91 04/30/2021    AST 40 07/14/2021    AST 28 04/30/2021    ALT 54 07/14/2021    ALT 45 04/30/2021     UA  Lab Results   Component Value Date    COLORU Yellow 07/14/2021    CLARITYU Clear 07/14/2021    GLUCOSEU Negative 07/14/2021    BILIRUBINUR Negative 07/14/2021    KETUA Negative 07/14/2021    SPECGRAV 1.025 07/14/2021    BLOODU Negative 07/14/2021    PHUR 7.5 07/14/2021    PROTEINU Negative 07/14/2021    UROBILINOGEN 0.2 07/14/2021    NITRU Negative 07/14/2021    LEUKOCYTESUR SMALL 07/14/2021       Physical Exam  Vitals and nursing note reviewed. Constitutional:       Appearance: Normal appearance. She is obese. HENT:      Head: Normocephalic and atraumatic. Right Ear: External ear normal.      Left Ear: External ear normal.      Nose: Nose normal.      Mouth/Throat:      Mouth: Mucous membranes are moist.      Pharynx: Oropharynx is clear. Eyes:      Extraocular Movements: Extraocular movements intact. Conjunctiva/sclera: Conjunctivae normal.      Pupils: Pupils are equal, round, and reactive to light. Cardiovascular:      Rate and Rhythm: Normal rate and regular rhythm. Pulses: Normal pulses. Heart sounds: Normal heart sounds. Pulmonary:      Effort: Pulmonary effort is normal.      Breath sounds: Normal breath sounds. Abdominal:      General: Bowel sounds are normal.      Palpations: Abdomen is soft. Musculoskeletal:         General: Normal range of motion. Cervical back: Normal range of motion and neck supple. Skin:     General: Skin is warm and dry. Capillary Refill: Capillary refill takes less than 2 seconds. Neurological:      General: No focal deficit present. Mental Status: She is alert and oriented to person, place, and time. Psychiatric:         Mood and Affect: Mood normal.         Behavior: Behavior normal.         Thought Content:  Thought content normal. ASSESSMENT/PLAN:  Samira was seen today for migraine and discuss labs. Diagnoses and all orders for this visit:    Transaminitis  -     US LIVER; Future    Migraine without status migrainosus, not intractable, unspecified migraine type       Educational materials and/or home exercises printed for patient's review and were included in patient instructions on his/her After Visit Summary and given to patient at the end of visit. Counseled regarding above diagnosis, including possible risks and complications,  especially if left uncontrolled. Counseled regarding the possible side effects, risks, benefits and alternatives to treatment; patient and/or guardian verbalizes understanding, agrees, feels comfortable with and wishes to proceed with above treatment plan. Advised patient to call with any new medication issues, and read all Rx info from pharmacy to assure aware of all possible risks and side effects of medication before taking. Reviewed age and gender appropriate health screening exams and vaccinations. Advised patient regarding importance of keeping up with recommended health maintenance and to schedule as soon as possible if overdue, as this is important in assessing for undiagnosed pathology, especially cancer, as well as protecting against potentially harmful/life threatening disease. Patient and/or guardian verbalizes understanding and agrees with above counseling, assessment and plan. All questions answered. Return in about 3 months (around 10/19/2021) for Headache. An  electronic signature was used to authenticate this note.     --Stefani Albarran,  on 7/19/2021 at 3:50 PM

## 2021-07-19 NOTE — PATIENT INSTRUCTIONS
Patient Education        Nonalcoholic Steatohepatitis (ROSEN): Care Instructions  Your Care Instructions     Nonalcoholic steatohepatitis (ROSEN) is liver inflammation. It is caused by a buildup of fat in the liver. The fat buildup is not caused by drinking alcohol. Because of the inflammation, the liver does not work as well as it should. ROSEN is part of a group of liver diseases called nonalcoholic fatty liver disease. In these diseases, fat builds up in the liver and sometimes causes liver damage. This damage can get worse over time. Follow-up care is a key part of your treatment and safety. Be sure to make and go to all appointments, and call your doctor if you are having problems. It's also a good idea to know your test results and keep a list of the medicines you take. How can you care for yourself at home? · Stay at a healthy weight. Or if you need to, slowly get to a healthy weight. · Control your cholesterol. Talk to your doctor about ways to lower your cholesterol, if needed. You might try getting active, taking medicines, and making healthy changes to your diet. · Eat healthy foods. This includes fruits, vegetables, lean meats and dairy, and whole grains. · If you have diabetes, keep your blood sugar at your target level. · Get at least 30 minutes of exercise on most days of the week. Walking is a good choice. You also may want to do other activities, such as running, swimming, cycling, or playing tennis or team sports. · Limit alcohol, or do not drink. Alcohol can damage the liver and cause health problems. When should you call for help? Call 911 anytime you think you may need emergency care. For example, call if:    · You have trouble breathing.     · You vomit blood or what looks like coffee grounds.    Call your doctor now or seek immediate medical care if:    · You feel very sleepy or confused.     · You have new or worse belly pain.     · You have a fever.     · There is a new or increasing yellow tint to your skin or the whites of your eyes.     · You have any abnormal bleeding, such as:  ? Nosebleeds. ? Vaginal bleeding that is different (heavier, more frequent, at a different time of the month) than what you are used to.  ? Bloody or black stools, or rectal bleeding. ? Bloody or pink urine. Watch closely for changes in your health, and be sure to contact your doctor if:    · Your belly is getting bigger.     · You are gaining weight.     · You have any problems. Where can you learn more? Go to https://[email or web address]. org and sign in to your [employer] account. Enter Y084 in the [employer] box to learn more about \"Nonalcoholic Steatohepatitis (ROSEN): Care Instructions. \"     If you do not have an account, please click on the \"Sign Up Now\" link. Current as of: February 10, 2021               Content Version: 12.9  © 2006-2021 Healthwise, Incorporated. Care instructions adapted under license by [facility] [place] ([facility]). If you have questions about a medical condition or this instruction, always ask your healthcare professional. [place] [address] any warranty or liability for your use of this information.

## 2021-07-30 DIAGNOSIS — G43.909 MIGRAINE WITHOUT STATUS MIGRAINOSUS, NOT INTRACTABLE, UNSPECIFIED MIGRAINE TYPE: Primary | ICD-10-CM

## 2021-07-30 RX ORDER — SUMATRIPTAN 50 MG/1
50 TABLET, FILM COATED ORAL
Qty: 9 TABLET | Refills: 5 | Status: SHIPPED
Start: 2021-07-30 | End: 2021-09-17 | Stop reason: SINTOL

## 2021-08-04 ENCOUNTER — HOSPITAL ENCOUNTER (OUTPATIENT)
Dept: SLEEP CENTER | Age: 26
Discharge: HOME | End: 2021-08-04
Payer: COMMERCIAL

## 2021-08-04 DIAGNOSIS — G47.33 OSA (OBSTRUCTIVE SLEEP APNEA): ICD-10-CM

## 2021-08-04 PROCEDURE — 95811 POLYSOM 6/>YRS CPAP 4/> PARM: CPT

## 2021-08-08 LAB — STATUS: NORMAL

## 2021-08-08 PROCEDURE — 95811 POLYSOM 6/>YRS CPAP 4/> PARM: CPT | Performed by: STUDENT IN AN ORGANIZED HEALTH CARE EDUCATION/TRAINING PROGRAM

## 2021-08-12 ENCOUNTER — TELEMEDICINE (OUTPATIENT)
Dept: FAMILY MEDICINE CLINIC | Age: 26
End: 2021-08-12
Payer: COMMERCIAL

## 2021-08-12 DIAGNOSIS — M79.10 MYALGIA: Primary | ICD-10-CM

## 2021-08-12 PROCEDURE — G8417 CALC BMI ABV UP PARAM F/U: HCPCS | Performed by: STUDENT IN AN ORGANIZED HEALTH CARE EDUCATION/TRAINING PROGRAM

## 2021-08-12 PROCEDURE — 1036F TOBACCO NON-USER: CPT | Performed by: STUDENT IN AN ORGANIZED HEALTH CARE EDUCATION/TRAINING PROGRAM

## 2021-08-12 PROCEDURE — G8427 DOCREV CUR MEDS BY ELIG CLIN: HCPCS | Performed by: STUDENT IN AN ORGANIZED HEALTH CARE EDUCATION/TRAINING PROGRAM

## 2021-08-12 PROCEDURE — 99213 OFFICE O/P EST LOW 20 MIN: CPT | Performed by: STUDENT IN AN ORGANIZED HEALTH CARE EDUCATION/TRAINING PROGRAM

## 2021-08-12 RX ORDER — CYCLOBENZAPRINE HCL 10 MG
10 TABLET ORAL NIGHTLY PRN
Qty: 30 TABLET | Refills: 1 | Status: SHIPPED
Start: 2021-08-12 | End: 2022-03-21

## 2021-08-12 ASSESSMENT — ENCOUNTER SYMPTOMS
SORE THROAT: 0
RHINORRHEA: 0
SINUS PAIN: 0
SINUS PRESSURE: 0
NAUSEA: 0
COUGH: 0
CONSTIPATION: 0
SHORTNESS OF BREATH: 0
VOMITING: 0
EYE REDNESS: 0
ABDOMINAL PAIN: 0
BACK PAIN: 1
DIARRHEA: 0
EYE PAIN: 0

## 2021-08-12 NOTE — PROGRESS NOTES
Shelly Clay (:  1995) is a 31 y.o. female,Established patient, here for evaluation of the following chief complaint(s): No chief complaint on file. ASSESSMENT/PLAN:  1. Myalgia  -     MAGNESIUM; Future  -     BASIC METABOLIC PANEL; Future  -     cyclobenzaprine (FLEXERIL) 10 MG tablet; Take 1 tablet by mouth nightly as needed for Muscle spasms, Disp-30 tablet, R-1Normal      Return in about 4 weeks (around 2021). SUBJECTIVE/OBJECTIVE:  Patient is a 31 year ol female seen today on the virtual mychart visit. She is have bilateral leg pain that goes up and down her legs. She has minor back pain. She has no numbness or tingling in the legs. Right now feeling it mostly in her thigh region but it travels. She sometimes feels like she has shin splints. She sometimes knows she will get a lot of pain if she has been active but she has not been active recently. Pain is sharp near her ankle. Dull and crampy other wise like muscle soreness. No swelling that she has noticed. No injury. Nothing makes it better. She tries to rest with her legs up and tried a massage thing but it does not help. She is flat footed. Review of Systems   Constitutional: Negative for chills, fatigue and fever. HENT: Negative for congestion, ear pain, postnasal drip, rhinorrhea, sinus pressure, sinus pain and sore throat. Eyes: Negative for pain and redness. Respiratory: Negative for cough and shortness of breath. Cardiovascular: Negative for chest pain. Gastrointestinal: Negative for abdominal pain, constipation, diarrhea, nausea and vomiting. Genitourinary: Negative for difficulty urinating and dysuria. Musculoskeletal: Positive for arthralgias, back pain and myalgias. Negative for neck pain. Skin: Negative for rash. Neurological: Positive for headaches. Negative for dizziness, light-headedness and numbness. Psychiatric/Behavioral: The patient is not nervous/anxious.         Current Outpatient Medications on File Prior to Visit   Medication Sig Dispense Refill    SUMAtriptan (IMITREX) 50 MG tablet Take 1 tablet by mouth once as needed for Migraine 9 tablet 5    traZODone (DESYREL) 50 MG tablet Take 1 tablet by mouth nightly 90 tablet 1    benzonatate (TESSALON) 200 MG capsule TAKE ONE CAPSULE BY MOUTH THREE TIMES A DAY AS NEEDED (Patient not taking: Reported on 7/12/2021)      methylPREDNISolone (MEDROL DOSEPACK) 4 MG tablet take for 6 days as directed (Patient not taking: Reported on 7/12/2021)      albuterol (ACCUNEB) 1.25 MG/3ML nebulizer solution INHALE 3 ML (1 VIAL) VIA NEBULIZER EVERY 4 TO 6 HOURS AS NEEDED      topiramate (TOPAMAX) 25 MG tablet Take 1 tablet in morning and night (Patient taking differently: Take 1 tablet at night) 60 tablet 3    albuterol sulfate HFA (PROVENTIL HFA) 108 (90 Base) MCG/ACT inhaler Inhale 2 puffs into the lungs every 4 hours as needed for Wheezing 1 Inhaler 1    norethindrone-ethinyl estradiol (LOESTRIN FE 1/20) 1-20 MG-MCG per tablet Take 1 tablet by mouth daily 1 packet 3    fluticasone (FLONASE) 50 MCG/ACT nasal spray 2 sprays by Each Nostril route daily 3 Bottle 1     No current facility-administered medications on file prior to visit. No flowsheet data found.      [INSTRUCTIONS:  \"[x]\" Indicates a positive item  \"[]\" Indicates a negative item  -- DELETE ALL ITEMS NOT EXAMINED]    Constitutional: [x] Appears well-developed and well-nourished [x] No apparent distress      [] Abnormal -     Mental status: [x] Alert and awake  [x] Oriented to person/place/time [x] Able to follow commands    [] Abnormal -     Eyes:   EOM    [x]  Normal    [] Abnormal -   Sclera  [x]  Normal    [] Abnormal -          Discharge [x]  None visible   [] Abnormal -     HENT: [x] Normocephalic, atraumatic  [] Abnormal -   [x] Mouth/Throat: Mucous membranes are moist    External Ears [x] Normal  [] Abnormal -    Neck: [x] No visualized mass [] Abnormal -     Pulmonary/Chest: [x] Respiratory effort normal   [x] No visualized signs of difficulty breathing or respiratory distress        [] Abnormal -      Musculoskeletal:   [x] Normal gait with no signs of ataxia         [x] Normal range of motion of neck        [] Abnormal -     Neurological:        [x] No Facial Asymmetry (Cranial nerve 7 motor function) (limited exam due to video visit)          [x] No gaze palsy        [] Abnormal -          Skin:        [x] No significant exanthematous lesions or discoloration noted on facial skin         [] Abnormal -            Psychiatric:       [x] Normal Affect [] Abnormal -        [x] No Hallucinations        Samira Coker, was evaluated through a synchronous (real-time) audio-video encounter. The patient (or guardian if applicable) is aware that this is a billable service. Verbal consent to proceed has been obtained within the past 12 months. The visit was conducted pursuant to the emergency declaration under the [address] [place] [address], [address] [place] [address] authority and the [employer] General Act. Patient identification was verified, and a caregiver was present when appropriate. The patient was located in a state where the provider was credentialed to provide care. An electronic signature was used to authenticate this note.     --Sunita Richey DO

## 2021-08-19 ENCOUNTER — OFFICE VISIT (OUTPATIENT)
Dept: SLEEP MEDICINE | Age: 26
End: 2021-08-19
Payer: COMMERCIAL

## 2021-08-19 VITALS
SYSTOLIC BLOOD PRESSURE: 119 MMHG | RESPIRATION RATE: 16 BRPM | DIASTOLIC BLOOD PRESSURE: 80 MMHG | HEART RATE: 88 BPM | WEIGHT: 293 LBS | HEIGHT: 60 IN | BODY MASS INDEX: 57.52 KG/M2 | OXYGEN SATURATION: 98 %

## 2021-08-19 DIAGNOSIS — G47.33 OBSTRUCTIVE SLEEP APNEA: Primary | ICD-10-CM

## 2021-08-19 DIAGNOSIS — E66.9 OBESITY, UNSPECIFIED CLASSIFICATION, UNSPECIFIED OBESITY TYPE, UNSPECIFIED WHETHER SERIOUS COMORBIDITY PRESENT: ICD-10-CM

## 2021-08-19 DIAGNOSIS — G47.00 INSOMNIA, UNSPECIFIED TYPE: ICD-10-CM

## 2021-08-19 PROCEDURE — G8427 DOCREV CUR MEDS BY ELIG CLIN: HCPCS | Performed by: STUDENT IN AN ORGANIZED HEALTH CARE EDUCATION/TRAINING PROGRAM

## 2021-08-19 PROCEDURE — 99244 OFF/OP CNSLTJ NEW/EST MOD 40: CPT | Performed by: STUDENT IN AN ORGANIZED HEALTH CARE EDUCATION/TRAINING PROGRAM

## 2021-08-19 PROCEDURE — G8417 CALC BMI ABV UP PARAM F/U: HCPCS | Performed by: STUDENT IN AN ORGANIZED HEALTH CARE EDUCATION/TRAINING PROGRAM

## 2021-08-19 ASSESSMENT — SLEEP AND FATIGUE QUESTIONNAIRES
HOW LIKELY ARE YOU TO NOD OFF OR FALL ASLEEP WHILE SITTING AND READING: 2
HOW LIKELY ARE YOU TO NOD OFF OR FALL ASLEEP WHILE WATCHING TV: 1
ESS TOTAL SCORE: 6
HOW LIKELY ARE YOU TO NOD OFF OR FALL ASLEEP WHILE SITTING AND TALKING TO SOMEONE: 0
HOW LIKELY ARE YOU TO NOD OFF OR FALL ASLEEP WHEN YOU ARE A PASSENGER IN A CAR FOR AN HOUR WITHOUT A BREAK: 3
HOW LIKELY ARE YOU TO NOD OFF OR FALL ASLEEP WHILE LYING DOWN TO REST IN THE AFTERNOON WHEN CIRCUMSTANCES PERMIT: 0
HOW LIKELY ARE YOU TO NOD OFF OR FALL ASLEEP IN A CAR, WHILE STOPPED FOR A FEW MINUTES IN TRAFFIC: 0
HOW LIKELY ARE YOU TO NOD OFF OR FALL ASLEEP WHILE SITTING QUIETLY AFTER LUNCH WITHOUT ALCOHOL: 0
HOW LIKELY ARE YOU TO NOD OFF OR FALL ASLEEP WHILE SITTING INACTIVE IN A PUBLIC PLACE: 0

## 2021-08-19 NOTE — PATIENT INSTRUCTIONS
It was a pleasure seeing you today Nir Lomeli! Summary of Today's Visit          1. Start APAP 4-15 cwp  There are several supply companies in the area, please let us know which company you would like to use for your CPAP and supplies. New PAP Therapy instructions to be compliant with most insurance coverage:    - Use PAP device for atleast 4 hours nightly. - PAP therapy must be used for 70% of nights (5/7 nights per week)    - Patient must follow up in the clinic within 30-90 days from getting the PAP device. 2. Contact your DME company about supplies or issues with your machine. As a general guideline, please replace your:  -CPAP mask every 3-6 months  -CPAP hose  every 3-6 months  -Filter every 2-4 weeks  -CPAP/BiPAP/ASV replacements every 5-7+ years     3. Continue healthy weight loss/stabilization, as this is recommended as a long-term intervention. 4. Please do not drive or operate machinery when you feel fatigued/sleepy/drowsy      5. Please try to sleep between 6-8 hours nightly with the CPAP mask    6. Please avoid sedatives, alcohol and caffeinated drinks at/near bed time. 7. Please call your supply (DME) company with any issues with your PAP device. Please call our office with any issues pertaining to the therapy.   ----Please note that complications of CHRISTA if not treated can increase risk for: systemic hypertension, pulmonary hypertension, cardiovascular morbidities (heart attack and stroke), car accidents and all cause mortality.            For general questions or scheduling issues, call my nurse at [phone] option [address] [place] [address] -[phone] option 2  F- [phone]        ----------------------------------------------------------    Please note that complications of CHRISTA if not treated can increase risk for: systemic hypertension , pulmonary hypertension (high blood pressure in the lungs), cardiovascular morbidities (heart attack and stroke), car accidents and all cause mortality (increased risk of death).    ----------------------------------------------------------  Common Issues and Solutions     Pillow is dislodging mask - Consider getting a CPAP pillow or switching to a mask with the hose at the top. Dry Mouth - Adjust Humidity and/or try Biotene Gel. (Excessive leak can also cause this)     Leak - Please call your equipment provider  as they may need to adjust your mask. Difficulty tolerating the PAP mask - Contact your equipment company to try a different mask, we can order a \"mini sleep study\"with the sleep tech to try different masks/settings of pressure, also we have a sleep psychologist to help with anxiety related to wearing the PAP mask      ----------------------------------------------------------     For Questions and Concerns: In case of difficulty with your PAP device or mask interface, please FIRST contact your [address] [address] [address] (The company who provides you the CPAP/BiPAP/ASV machine/supplies). If you need the number for any other DME company, please call my Nurse at [phone] option 2     For questions concerning your [place] appointment: Call [phone] option 2  SLEEP LAB SCHEDULING:        ----------------------------------------------------------     Helpful Web Sites: For patients with ALL SLEEP DISORDERS: American Academy of Sleep Medicine http://sleepeducation. org; or [employer]: https://sleepfoundation. org  For patients with CHRISTA: American Sleep Apnea Association: http://robertson.org/. org  For patients with RLS: RLS Foundation: Ousmane.esperanza  For patients with INSOMNIA: https://www.keith.org/. org/articles/sleep/insomnia-causes-and-cures. htm  For patients with DEPRESSION: DueProps.com.Health Integrated. com/depression            Learning About CPAP for Sleep Apnea  What is CPAP?      CPAP/Bi-PAP is a small machine that you use at home every night while you sleep. It increases air pressure in your throat to keep your airway open. When you have sleep apnea, this can help you sleep better so you feel much better. CPAP stands for \"continuous positive airway pressure. \" Bi-PAP is a different PAP setting that allows for different pressures when you breathe in and out. The CPAP/Bi-PAP machine will have one of the following:  · A mask that covers your nose and mouth  · Prongs that fit into your nose  · A mask that covers your nose only, the most common type. This type is called NCPAP. The N stands for \"nasal.\"  Why is it done? CPAP is a common/effective treatment for obstructive sleep apnea. How does it help? · CPAP can help you have more normal sleep, so you feel less sleepy and more alert during the daytime through the treatment of sleep apnea. · CPAP may help keep heart failure or other heart problems from getting worse. · CPAP may help lower your blood pressure. · If you use CPAP, your bed partner may also sleep better because you are snoring less. What are the side effects? Some people who use CPAP have:  · A dry or stuffy nose and a sore throat. · Irritated skin on the face. · Sore eyes. · Bloating. If you have any of these problems, work with your doctor to fix them. Here are some things you can try:  · Be sure the mask or nasal prongs fit well. · See if your doctor can adjust the pressure of your CPAP. · If your nose is dry, try a humidifier. If these things do not help, you might try a different type of machine. Some machines have air pressure that adjusts on its own. Others have air pressures that are different when you breathe in than when you breathe out. This may reduce discomfort caused by too much pressure in your nose. Where can you learn more? Go to https://SavvySyncpepiceweb.[employer]. org and sign in to your [employer] account.  Enter L701 in the [employer] box to learn more about \"Learning About CPAP for Sleep Apnea. \"     If you do not have an account, please click on the \"Sign Up Now\" link. Current as of: February 24, 2020               Content Version: 12.5  © 2006-2020 Healthwise, Incorporated. Care instructions adapted under license by [facility] ([facility]). If you have questions about a medical condition or this instruction, always ask your healthcare professional. [address] any warranty or liability for your use of this information. Sleep Hygiene Guidelines  Good dental hygiene is important in determining the health of your teeth and gums. Similarly, good sleep hygiene is important in determining the quality and quantity of your sleep. Review the below guidelines and check the ones you think you might be breaking. 1. Screen time: Turn off TV, computers, tablets, and smart phones 1 hour Before Bedtime  The short waves of blue light (emitted from the screens of TVs, laptops, iPads, smart phones, etc.) mimic daylight. Thinking its daytime, your brain suppresses melatonin and becomes more alert because we have evolved to see this type of light only during the day. Whats more, the overall stimulation we get from these devices serves to keep us more alert. If TV is your relaxing activity, try to move it up a bit earlier in the evening. The TV should be in a separate room - NOT your bedroom. 2. Caffeine: Avoid Caffeine 6-8 Hours Before Bedtime   Caffeine disturbs sleep, even in people who dont think they experience a stimulation effect.  Individuals with insomnia are often more sensitive to mild stimulants than are normal sleepers.  Caffeine is found in items such as coffee, tea, soda, chocolate, and many over-the-counter medications (e.g., Excedrin).  Caffeine should be avoided in the afternoon and evening, preferably taken no later than 1pm. You might consider a trial period of no caffeine at all.      3. Nicotine: Avoid Nicotine Before Bedtime   Although some smokers claim that smoking helps them relax, nicotine is a stimulant.  The initial relaxing effects occur with the initial entry of the nicotine, but as the nicotine builds in the body, it produces an effect similar to caffeine.  Nicotine should be avoided near bedtime and during the night. Dont smoke to get yourself back to sleep. 4. Alcohol: Avoid Alcohol After Dinner   Alcohol often promotes the onset of sleep, but as alcohol is metabolized during the night, sleep becomes disturbed and fragmented, leading to poor sleep quality.  Limit alcohol use to 1 beer or glass of wine per day for women, 2  drinks per day for men. 5. Sleeping Pills: Sleep Medications are Effective Only Temporarily   Research has shown that sleep medications lose their effectiveness in about 2 - 4 weeks when taken regularly.  Over time, sleeping pills actually can make sleep problems worse. When sleeping pills have been used over a long period, withdrawal from the medication can lead to an insomnia rebound. Thus, after long- term use, many individuals incorrectly conclude that they need sleeping pills in order to sleep normally.  Keep use of sleep meds infrequent, but dont worry if you need to use one on an occasional basis. (And always consult with your doctor first if you decide to make changes to your medication regimen.)     6. Regular Exercise   Exercise has been shown to aid sleep, although the positive effect often takes several weeks to become noticeable.  However, exercise earlier in the day if possible. Exercise within 2 hours of bedtime may elevate nervous system activity and interfere with sleep onset.  Get regular exercise, preferably at least 20 minutes each day of an activity that causes sweating. 7. Hot Baths   Spending 20 minutes in a tub of hot water an hour or two prior to bedtime may promote sleep.      8.Bedroom Environment: Moderate Temperature, Quiet, and Dark   Extremes of heat or cold can disrupt sleep. Keep your room at a comfortable temperature.  Noises can be masked with background white noise (such as the noise of a fan) or with earplugs.  Bedrooms may be darkened with black-out shades or sleep masks can be worn.  Position clocks out-of-sight since clock-watching can increase worry about the effects of lack of sleep. 9. Eating   A light bedtime snack, such a glass of warm milk, cheese, or a bowl of cereal can promote sleep.  Avoid heavy or spicy meals before bedtime and any caffeinated foods (e.g., chocolate).  Avoid snacks in the middle of the night since awakening may become associated with hunger.  Do not go to bed too hungry or too full. 10. Avoid/Reduce Naps   The sleep you obtain during the day takes away from your sleep need at night - resulting in lighter, more restless sleep, difficulty falling asleep, and/or early morning awakening.  Avoid naps. If you must nap, keep it brief. It is best to set an alarm to ensure you dont sleep more than 15 minutes. 11. Regular Sleep Schedule   Keep a regular time each day, 7 days a week, to get out of bed. Keeping a regular awakening time helps set your circadian rhythm so that your body learns to sleep at the desired time.

## 2021-08-19 NOTE — PROGRESS NOTES
[facility] Sleep Medicine    Patient Name: Shelly Clay  Age: 31 y.o.   : 1995    Date of Visit: 21      HPI   Shelly Clay is a 31 y.o. female with  has a past medical history of Asthma, Kidney stone, and Migraines. who presents as a new patient to Sleep Clinic, referred by Dr. Neftali Briceno, for Sleep Apnea (snoring)    Patient reports that she is new to [place] and that a new sleep study was ordered as part of her new patient evaluation. Patient had been recommended a sleep study prior to moving, however was not able to complete prior to moving to [place]. Patient was noted to have symptoms of snoring and apnea as noticed by her significant other at that time. Also of note, the patient had notes difficulty falling asleep at night. Sleep Study Results  Split Night Sleep Study:   Study date: 2021  AHI:  52.9   Oxygen Manjeet: 55.0    Bed time:   Midnight  Wake time:   8-10 am  Sleep Latency (min):  due to mind racing   Sleep Medications: Trazodone  Awakenings:   1   / bathroom  / falls back asleep occasionally  Estimated Sleep time (hours):  7-8 with Trazodone  Daytime Naps: none  Sleep disturbances: occasional headache (history of migraines)  Sleep Location: Bed  Sleep environment: Sleeps with significant other  Occupation: Unemployed    SLEEP HYGIENE     Activities before bed: TV/read  Caffeine:   1  Coffee    rare   Soda    0   Tea  Alcohol: rare  Tobacco: none        Sleep ROS:     Snoring: Y   Witnessed apneas: Y  AM Headache: Y  Dry Mouth: Y  Daytime Sleepiness: Y  Difficulty remembering things: Y  MVA  or near miss accident due to sleepiness in the past? N  Tonsillectomy? N  Have you lost or gained weight recently?  Gained- 25 lbs       PARASOMNIAS/ NARCOLEPSY:  Hypnogogic/Hynopompic Hallucinations: N   Sleep paralysis: N  Cataplexy: N   REM behavior symptoms: N  Nightmare: N   Sleep Walking: N  Sleep Talking: N  RLS Symptoms: N           PMH:  Past Medical History:   Diagnosis Date    Asthma     Kidney stone     Migraines         PSH:  No past surgical history on file. No surgical procedures reported    Soc Hx:  Social History     Tobacco Use    Smoking status: Never Smoker    Smokeless tobacco: Never Used   Substance Use Topics    Alcohol use: Yes    Drug use: Never        Fam Hx:  No family history on file. Current Outpatient Medications   Medication Sig Dispense Refill    cyclobenzaprine (FLEXERIL) 10 MG tablet Take 1 tablet by mouth nightly as needed for Muscle spasms 30 tablet 1    SUMAtriptan (IMITREX) 50 MG tablet Take 1 tablet by mouth once as needed for Migraine 9 tablet 5    traZODone (DESYREL) 50 MG tablet Take 1 tablet by mouth nightly 90 tablet 1    albuterol (ACCUNEB) 1.25 MG/3ML nebulizer solution INHALE 3 ML (1 VIAL) VIA NEBULIZER EVERY 4 TO 6 HOURS AS NEEDED      topiramate (TOPAMAX) 25 MG tablet Take 1 tablet in morning and night (Patient taking differently: Take 1 tablet at night) 60 tablet 3    albuterol sulfate HFA (PROVENTIL HFA) 108 (90 Base) MCG/ACT inhaler Inhale 2 puffs into the lungs every 4 hours as needed for Wheezing 1 Inhaler 1    norethindrone-ethinyl estradiol (LOESTRIN FE 1/20) 1-20 MG-MCG per tablet Take 1 tablet by mouth daily 1 packet 3    fluticasone (FLONASE) 50 MCG/ACT nasal spray 2 sprays by Each Nostril route daily 3 Bottle 1     No current facility-administered medications for this visit. Review of Systems  Constitutional: no chills, no fever and no night sweats. Eyes: no blurred vision and no eyesight problems. ENT: no hearing loss, no nasal congestion, no nasal discharge, no hoarseness and no sore throat. Cardiovascular: no chest pain, no lower extremity edema. Respiratory: no cough, no shortness of breath at rest and no wheezing. Gastrointestinal: no abdominal pain, no nausea and no vomiting. Musculoskeletal: no arthralgias, no back pain and no myalgias. Integumentary: no rashes.    Neurological: no Order for CPAP as OP    Insomnia, unspecified type    Obesity, unspecified classification, unspecified obesity type, unspecified whether serious comorbidity present  -     Ambulatory referral to Bariatrics    ·    Plan:      1. CHRISTA. Treatment options were discussed with the patient, including weight loss, CPAP titration, APAP trial, oral appliance evaluation, and possible surgical options. After discussion, patient elected to proceed with APAP trial. Will start APAP 4-15 cwp and follow up in 2-3 months. 2. Insomnia. Patient currently taking Trazodone as prescribed by her PCP. Education provided in her handout. 3.  Obesity. Body mass index is 58.49 kg/m².   Referral to medical weight loss with Dr. Edwina Jeff.  -Healthy weight loss advised      Patient will follow up Return in about 3 months (around 11/19/2021) for Follow up After PAP Trial.    Yayo Ward, [address] [place] [address] -[phone] [address] [phone]

## 2021-09-01 ENCOUNTER — TELEPHONE (OUTPATIENT)
Dept: BARIATRICS/WEIGHT MGMT | Age: 26
End: 2021-09-01

## 2021-09-07 ENCOUNTER — TELEPHONE (OUTPATIENT)
Dept: BARIATRICS/WEIGHT MGMT | Age: 26
End: 2021-09-07

## 2021-09-17 ENCOUNTER — OFFICE VISIT (OUTPATIENT)
Dept: NEUROLOGY | Age: 26
End: 2021-09-17
Payer: COMMERCIAL

## 2021-09-17 VITALS
HEIGHT: 65 IN | WEIGHT: 293 LBS | DIASTOLIC BLOOD PRESSURE: 70 MMHG | BODY MASS INDEX: 48.82 KG/M2 | SYSTOLIC BLOOD PRESSURE: 130 MMHG

## 2021-09-17 DIAGNOSIS — R74.01 ELEVATED LIVER TRANSAMINASE LEVEL: ICD-10-CM

## 2021-09-17 DIAGNOSIS — G43.009 MIGRAINE WITHOUT AURA AND WITHOUT STATUS MIGRAINOSUS, NOT INTRACTABLE: Chronic | ICD-10-CM

## 2021-09-17 PROCEDURE — G8417 CALC BMI ABV UP PARAM F/U: HCPCS | Performed by: PSYCHIATRY & NEUROLOGY

## 2021-09-17 PROCEDURE — 99204 OFFICE O/P NEW MOD 45 MIN: CPT | Performed by: PSYCHIATRY & NEUROLOGY

## 2021-09-17 PROCEDURE — G8427 DOCREV CUR MEDS BY ELIG CLIN: HCPCS | Performed by: PSYCHIATRY & NEUROLOGY

## 2021-09-17 PROCEDURE — 1036F TOBACCO NON-USER: CPT | Performed by: PSYCHIATRY & NEUROLOGY

## 2021-09-17 RX ORDER — ONDANSETRON 4 MG/1
4 TABLET, FILM COATED ORAL EVERY 12 HOURS PRN
Qty: 30 TABLET | Refills: 2 | Status: SHIPPED | OUTPATIENT
Start: 2021-09-17

## 2021-09-17 RX ORDER — RIZATRIPTAN BENZOATE 5 MG/1
TABLET ORAL
Qty: 12 TABLET | Refills: 3 | Status: SHIPPED | OUTPATIENT
Start: 2021-09-17

## 2021-09-17 ASSESSMENT — ENCOUNTER SYMPTOMS
APNEA: 1
EYES NEGATIVE: 1
ALLERGIC/IMMUNOLOGIC NEGATIVE: 1
GASTROINTESTINAL NEGATIVE: 1

## 2021-09-17 NOTE — PROGRESS NOTES
Neurology Consult Note:    Patient: Cheryl Lucero  : 1995  Date: 21  Referring provider: Fletcher Villanueva DO    Referral to Neurology: History of migraine headaches, childhood onset. Dear Fletcher Villanueva DO     Thank you for your referral of Cheryl Lucero referred for evaluation of chronic episodic migraines without aura of childhood onset. Below is a summary of her headache history obtained today:    Headache history: Onset: childhood  Location:can be anywhere over head, no hemicranial preference. Pain types: aching, throbbing, pressure pain  Frequency: Migraines improved, dec. haven't had migraine in 2 wks. -since starting topiramate  Duration: hrs.-2 days. Positive symptoms: photophobia, sonophobia, N/V, prefers quiet dark room, no LOV. No visual aura. Med. Used/tried: Migraines improved with Trazodone, topiramate Rxs; now reports several wks. wo/migraine. Has sumatriptan prescriptionbut recalls reaction with Imitrex in the past, \"felt like head was on fire\". Tried Elavil but affected emotion, prn naproxen, no vascular meds. Tried Cymbalta-stopped prior to move from [place] last year  Medical co-morbidities: dx CHRISTA soon to start CPAP, PCOS, obesity, history kidney stone, asthma. Family hx: mother, paternal grandmother with migraines  Caffeine use: 1 cup/tea per day. Lab Data: Reviewed from 2021, elevated LFTs, glucose 96, sodium 141, normal BUN and creatinine, sed rate 15. Imaging Data: [place] head CT, 21; no acute intracranial abnormality. Current Outpatient Medications   Medication Sig Dispense Refill    rizatriptan (MAXALT) 5 MG tablet Take one at migraine onset, may repeat once in 2-4 hrs.  12 tablet 3    ondansetron (ZOFRAN) 4 MG tablet Take 1 tablet by mouth every 12 hours as needed for Nausea or Vomiting Associated with  migraine 30 tablet 2    traZODone (DESYREL) 50 MG tablet Take 1 tablet by mouth nightly 90 tablet 1    albuterol (ACCUNEB) 1.25 MG/3ML nebulizer solution INHALE 3 ML (1 VIAL) VIA NEBULIZER EVERY 4 TO 6 HOURS AS NEEDED      topiramate (TOPAMAX) 25 MG tablet Take 1 tablet in morning and night (Patient taking differently: Take 1 tablet at night) 60 tablet 3    albuterol sulfate HFA (PROVENTIL HFA) 108 (90 Base) MCG/ACT inhaler Inhale 2 puffs into the lungs every 4 hours as needed for Wheezing 1 Inhaler 1    norethindrone-ethinyl estradiol (LOESTRIN FE 1/20) 1-20 MG-MCG per tablet Take 1 tablet by mouth daily 1 packet 3    fluticasone (FLONASE) 50 MCG/ACT nasal spray 2 sprays by Each Nostril route daily 3 Bottle 1     No current facility-administered medications for this visit. Allergies   Allergen Reactions    Bee Venom Swelling    Sumatriptan Other (See Comments)     Reports \"felt like head was on fire\"       Patient Active Problem List   Diagnosis    CHRISTA (obstructive sleep apnea)    Elevated liver transaminase level    Migraine without aura       Past Medical History:   Diagnosis Date    Asthma     Elevated liver transaminase level 9/17/2021    Kidney stone     Migraine without aura 9/17/2021    Migraines        No past surgical history on file.     Family History   Problem Relation Age of Onset    Migraines Mother        Social History     Socioeconomic History    Marital status: Single     Spouse name: Not on file    Number of children: Not on file    Years of education: Not on file    Highest education level: Not on file   Occupational History    Not on file   Tobacco Use    Smoking status: Never Smoker    Smokeless tobacco: Never Used   Substance and Sexual Activity    Alcohol use: Yes     Comment: occ    Drug use: Never    Sexual activity: Not on file   Other Topics Concern    Not on file   Social History Narrative    Not on file     Social Determinants of Health     Financial Resource Strain: Low Risk     Difficulty of Paying Living Expenses: Not hard at all   Food Insecurity: No Food Insecurity    Worried About [facility] in the Last Year: Never true    Marla of Food in the Last Year: Never true   Transportation Needs:     Lack of Transportation (Medical):  Lack of Transportation (Non-Medical):    Physical Activity:     Days of Exercise per Week:     Minutes of Exercise per Session:    Stress:     Feeling of Stress :    Social Connections:     Frequency of Communication with Friends and Family:     Frequency of Social Gatherings with Friends and Family:     Attends Gnosticism Services:     Active Member of Clubs or Organizations:     Attends Club or Organization Meetings:     Marital Status:    Intimate Partner Violence:     Fear of Current or Ex-Partner:     Emotionally Abused:     Physically Abused:     Sexually Abused:      Review of Systems   Constitutional: Negative. HENT: Negative. Eyes: Negative. Respiratory: Positive for apnea. Hx CHRISTA, has not started CPAP yet   Cardiovascular: Negative. Gastrointestinal: Negative. Endocrine:        PCOS, metabolic syndrome, history of prediabetes   Genitourinary: Negative. Musculoskeletal: Negative. Skin: Negative. Allergic/Immunologic: Negative. Neurological: Positive for headaches. Hematological: Negative. Psychiatric/Behavioral: The patient is nervous/anxious. All other systems reviewed and are negative. Neurologic Exam:  /70 (Site: Right Upper Arm, Position: Sitting, Cuff Size: Medium Adult)   Ht 5' 5\" (1.651 m)   Wt 299 lb (135.6 kg)   BMI 49.76 kg/m²   General appearance: Alert, cooperative, anxious, obese, well-nourished, well-groomed, seated in the exam room, no acute distress  HEENT: Normocephalic/atraumatic.   Neck: Supple  Cardiac: RRR  Respiratory: grossly clear  Extremities: No edema, erythema or cyanosis  Skin: No apparent lesions or rashes  Musculoskeletal: No fasciculations or tremors  Mental Status: Alert, oriented x4  Speech/Language: Clear, fluent  Attention software. Despite proofreading, there may be several typographical errors present that may affect the meaning of the content. Please call with any questions. Note: A total time of 40 mins. was spent on the date of service in preparation for this visit, which included face-to-face patient care and completing clinical documentation, and including counseling and coordination of care based on clinical impression, neurologic diagnosis, review of pertinent imaging studies, test results, implementation and discussion of treatment plan, risk factor reduction and patient and/or family education.

## 2021-09-22 ENCOUNTER — HOSPITAL ENCOUNTER (OUTPATIENT)
Dept: ULTRASOUND IMAGING | Age: 26
Discharge: HOME | End: 2021-09-24
Payer: COMMERCIAL

## 2021-09-22 DIAGNOSIS — R74.01 TRANSAMINITIS: ICD-10-CM

## 2021-09-22 PROCEDURE — 76705 ECHO EXAM OF ABDOMEN: CPT

## 2021-10-06 ENCOUNTER — TELEPHONE (OUTPATIENT)
Dept: SLEEP MEDICINE | Age: 26
End: 2021-10-06

## 2021-10-06 NOTE — TELEPHONE ENCOUNTER
Last seen 8/2021 for Obstructive sleep apnea. Patient has not received CPAP machine as of yet. Please advise patient of status of machine [phone].

## 2021-10-06 NOTE — TELEPHONE ENCOUNTER
Call to pt to let her know this nurse spoke to [facility] re:not receiving cpap equipment.  Haritha Hirsch stated they have been trying to get a hold of the pt since 9-7-21 with no call backs, LM with [facility] # [phone] to call to get set up or to call office with questions or concerns

## 2021-10-18 ENCOUNTER — OFFICE VISIT (OUTPATIENT)
Dept: FAMILY MEDICINE CLINIC | Age: 26
End: 2021-10-18
Payer: COMMERCIAL

## 2021-10-18 VITALS
DIASTOLIC BLOOD PRESSURE: 64 MMHG | SYSTOLIC BLOOD PRESSURE: 122 MMHG | OXYGEN SATURATION: 97 % | BODY MASS INDEX: 48.82 KG/M2 | TEMPERATURE: 97.5 F | HEIGHT: 65 IN | HEART RATE: 95 BPM | RESPIRATION RATE: 16 BRPM | WEIGHT: 293 LBS

## 2021-10-18 DIAGNOSIS — Z28.21 VACCINATION NOT CARRIED OUT BECAUSE OF PATIENT REFUSAL: ICD-10-CM

## 2021-10-18 DIAGNOSIS — J45.909 UNCOMPLICATED ASTHMA, UNSPECIFIED ASTHMA SEVERITY, UNSPECIFIED WHETHER PERSISTENT: ICD-10-CM

## 2021-10-18 DIAGNOSIS — G43.009 MIGRAINE WITHOUT AURA AND WITHOUT STATUS MIGRAINOSUS, NOT INTRACTABLE: Primary | Chronic | ICD-10-CM

## 2021-10-18 DIAGNOSIS — G47.33 OSA (OBSTRUCTIVE SLEEP APNEA): ICD-10-CM

## 2021-10-18 DIAGNOSIS — M79.671 BILATERAL FOOT PAIN: ICD-10-CM

## 2021-10-18 DIAGNOSIS — K76.0 FATTY LIVER: ICD-10-CM

## 2021-10-18 DIAGNOSIS — M79.672 BILATERAL FOOT PAIN: ICD-10-CM

## 2021-10-18 PROCEDURE — G8417 CALC BMI ABV UP PARAM F/U: HCPCS | Performed by: STUDENT IN AN ORGANIZED HEALTH CARE EDUCATION/TRAINING PROGRAM

## 2021-10-18 PROCEDURE — 99213 OFFICE O/P EST LOW 20 MIN: CPT | Performed by: STUDENT IN AN ORGANIZED HEALTH CARE EDUCATION/TRAINING PROGRAM

## 2021-10-18 PROCEDURE — 1036F TOBACCO NON-USER: CPT | Performed by: STUDENT IN AN ORGANIZED HEALTH CARE EDUCATION/TRAINING PROGRAM

## 2021-10-18 PROCEDURE — G8484 FLU IMMUNIZE NO ADMIN: HCPCS | Performed by: STUDENT IN AN ORGANIZED HEALTH CARE EDUCATION/TRAINING PROGRAM

## 2021-10-18 PROCEDURE — G8427 DOCREV CUR MEDS BY ELIG CLIN: HCPCS | Performed by: STUDENT IN AN ORGANIZED HEALTH CARE EDUCATION/TRAINING PROGRAM

## 2021-10-18 RX ORDER — ALBUTEROL SULFATE 90 UG/1
2 AEROSOL, METERED RESPIRATORY (INHALATION) EVERY 4 HOURS PRN
Qty: 1 EACH | Refills: 5 | Status: SHIPPED
Start: 2021-10-18 | End: 2022-08-19 | Stop reason: SDUPTHER

## 2021-10-18 RX ORDER — TOPIRAMATE 25 MG/1
50 TABLET ORAL NIGHTLY
COMMUNITY
End: 2021-11-15

## 2021-10-18 ASSESSMENT — ENCOUNTER SYMPTOMS
NAUSEA: 0
EYE PAIN: 0
DIARRHEA: 0
SHORTNESS OF BREATH: 0
SINUS PAIN: 0
EYE REDNESS: 0
CONSTIPATION: 0
SORE THROAT: 0
ABDOMINAL PAIN: 0
SINUS PRESSURE: 0
VOMITING: 0
RHINORRHEA: 0
COUGH: 0
BACK PAIN: 0
APNEA: 1

## 2021-10-18 NOTE — PROGRESS NOTES
10/18/2021    Romero Vo (:  1995) is a 31 y.o. female, here for evaluation of the following medical concerns:    HPI  Chief Complaint   Patient presents with    3 Month Follow-Up     Liver U/S results    Migraine    Sleep Apnea     Patient here for a 3 month follow up on her migraines. I did refer patient to neurology and they recommended maxalt for her and to possibly increase the topamax to 50 mg a day. They also wanted her to discuss with her OBGYN before doing the increase dosage due to her PCOS and her birth control. Headaches have been doing better she gets some slight headaches. She has not had to use the maxalt which is great. Patient needs a CPAP machine and already got her machine. She is already feeling better with the machine. US liver showed fatty liver. Discussed healthy eating and diet and exercise and how losing weight will help this. Nothing further to do at this time. She has been referred to bariatrics. They will help her with the nutrition. Review of Systems   Constitutional: Negative for chills, fatigue and fever. HENT: Negative for congestion, ear pain, postnasal drip, rhinorrhea, sinus pressure, sinus pain and sore throat. Eyes: Negative for pain and redness. Respiratory: Positive for apnea (CHRISTA). Negative for cough and shortness of breath. Cardiovascular: Negative for chest pain. Gastrointestinal: Negative for abdominal pain, constipation, diarrhea, nausea and vomiting. Endocrine:        PCOS+   Genitourinary: Negative for difficulty urinating and dysuria. Musculoskeletal: Positive for arthralgias (bilateral foot pain). Negative for back pain, myalgias and neck pain. Skin: Negative for rash. Neurological: Positive for headaches. Negative for dizziness, light-headedness and numbness. Psychiatric/Behavioral: The patient is not nervous/anxious. Prior to Visit Medications    Medication Sig Taking?  Authorizing Provider   topiramate (TOPAMAX) 25 MG tablet Take 50 mg by mouth nightly Yes Historical Provider, MD   albuterol sulfate HFA (PROVENTIL HFA) 108 (90 Base) MCG/ACT inhaler Inhale 2 puffs into the lungs every 4 hours as needed for Wheezing Yes Janette Escalante DO   rizatriptan (MAXALT) 5 MG tablet Take one at migraine onset, may repeat once in 2-4 hrs. Yes Gonzalez Bañuelos MD   ondansetron (ZOFRAN) 4 MG tablet Take 1 tablet by mouth every 12 hours as needed for Nausea or Vomiting Associated with  migraine Yes Gonzalez Bañuelos MD   traZODone (DESYREL) 50 MG tablet Take 1 tablet by mouth nightly Yes Janette Escalante DO   albuterol (ACCUNEB) 1.25 MG/3ML nebulizer solution INHALE 3 ML (1 VIAL) VIA NEBULIZER EVERY 4 TO 6 HOURS AS NEEDED Yes Historical Provider, MD   norethindrone-ethinyl estradiol (LOESTRIN FE 1/20) 1-20 MG-MCG per tablet Take 1 tablet by mouth daily Yes Janette Escalante DO   fluticasone (FLONASE) 50 MCG/ACT nasal spray 2 sprays by Each Nostril route daily Yes Janette Escalante DO        Allergies   Allergen Reactions    Bee Venom Swelling    Seasonal      Sneezing, itchy watery eyes, congestion    Sumatriptan Other (See Comments)     Reports \"felt like head was on fire\"       Past Medical History:   Diagnosis Date    Asthma     Elevated liver transaminase level 9/17/2021    Kidney stone     Migraine without aura 9/17/2021    Migraines        History reviewed. No pertinent surgical history.     Social History     Socioeconomic History    Marital status: Single     Spouse name: Not on file    Number of children: Not on file    Years of education: Not on file    Highest education level: Not on file   Occupational History    Not on file   Tobacco Use    Smoking status: Never Smoker    Smokeless tobacco: Never Used   Substance and Sexual Activity    Alcohol use: Yes     Comment: occ    Drug use: Never    Sexual activity: Not on file   Other Topics Concern    Not on file   Social History Narrative    Not on file Social Determinants of Health     Financial Resource Strain: Low Risk     Difficulty of Paying Living Expenses: Not hard at all   Food Insecurity: No Food Insecurity    Worried About Running Out of Food in the Last Year: Never true    Marla of Food in the Last Year: Never true   Transportation Needs:     Lack of Transportation (Medical):  Lack of Transportation (Non-Medical):    Physical Activity:     Days of Exercise per Week:     Minutes of Exercise per Session:    Stress:     Feeling of Stress :    Social Connections:     Frequency of Communication with Friends and Family:     Frequency of Social Gatherings with Friends and Family:     Attends Congregational Services:     Active Member of Clubs or Organizations:     Attends Club or Organization Meetings:     Marital Status:    Intimate Partner Violence:     Fear of Current or Ex-Partner:     Emotionally Abused:     Physically Abused:     Sexually Abused:         Family History   Problem Relation Age of Onset    Migraines Mother            Vitals:    10/18/21 1427   BP: 122/64   Pulse: 95   Resp: 16   Temp: 97.5 °F (36.4 °C)   TempSrc: Infrared   SpO2: 97%   Weight: 295 lb 3.2 oz (133.9 kg)   Height: 5' 5\" (1.651 m)     Estimated body mass index is 49.12 kg/m² as calculated from the following:    Height as of this encounter: 5' 5\" (1.651 m). Weight as of this encounter: 295 lb 3.2 oz (133.9 kg).     Most Recent Labs  CBC  Lab Results   Component Value Date    WBC 7.2 07/14/2021    WBC 9.7 04/30/2021    RBC 5.26 07/14/2021    RBC 4.91 04/30/2021    HGB 14.4 07/14/2021    HGB 13.3 04/30/2021    HCT 44.6 07/14/2021    HCT 41.2 04/30/2021    MCV 84.8 07/14/2021    MCV 83.9 04/30/2021     07/14/2021     04/30/2021      CMP  Lab Results   Component Value Date     07/14/2021     04/30/2021    K 4.3 07/14/2021    K 3.9 04/30/2021     07/14/2021     04/30/2021    CO2 24 07/14/2021    CO2 25 04/30/2021 ANIONGAP 14 07/14/2021    ANIONGAP 10 04/30/2021    GLUCOSE 96 07/14/2021    GLUCOSE 153 04/30/2021    BUN 9 07/14/2021    BUN 11 04/30/2021    CREATININE 0.8 07/14/2021    CREATININE 0.8 04/30/2021    LABGLOM >60 07/14/2021    LABGLOM >60 04/30/2021    GFRAA >60 07/14/2021    GFRAA >60 04/30/2021    CALCIUM 9.9 07/14/2021    CALCIUM 9.1 04/30/2021    PROT 7.9 07/14/2021    PROT 7.1 04/30/2021    LABALBU 4.5 07/14/2021    LABALBU 4.4 04/30/2021    BILITOT 0.3 07/14/2021    BILITOT <0.2 04/30/2021    ALKPHOS 98 07/14/2021    ALKPHOS 91 04/30/2021    AST 40 07/14/2021    AST 28 04/30/2021    ALT 54 07/14/2021    ALT 45 04/30/2021     UA  Lab Results   Component Value Date    COLORU Yellow 07/14/2021    CLARITYU Clear 07/14/2021    GLUCOSEU Negative 07/14/2021    BILIRUBINUR Negative 07/14/2021    KETUA Negative 07/14/2021    SPECGRAV 1.025 07/14/2021    BLOODU Negative 07/14/2021    PHUR 7.5 07/14/2021    PROTEINU Negative 07/14/2021    UROBILINOGEN 0.2 07/14/2021    NITRU Negative 07/14/2021    LEUKOCYTESUR SMALL 07/14/2021       Physical Exam  Vitals and nursing note reviewed. Constitutional:       Appearance: Normal appearance. She is obese. HENT:      Head: Normocephalic and atraumatic. Right Ear: External ear normal.      Left Ear: External ear normal.      Mouth/Throat:      Mouth: Mucous membranes are moist.      Pharynx: Oropharynx is clear. Eyes:      Extraocular Movements: Extraocular movements intact. Conjunctiva/sclera: Conjunctivae normal.      Pupils: Pupils are equal, round, and reactive to light. Cardiovascular:      Rate and Rhythm: Normal rate and regular rhythm. Pulses: Normal pulses. Heart sounds: Normal heart sounds. Pulmonary:      Effort: Pulmonary effort is normal.      Breath sounds: Normal breath sounds. Abdominal:      General: Bowel sounds are normal.      Palpations: Abdomen is soft. Musculoskeletal:         General: Normal range of motion.       Cervical back: Normal range of motion and neck supple. Skin:     General: Skin is warm and dry. Capillary Refill: Capillary refill takes less than 2 seconds. Neurological:      General: No focal deficit present. Mental Status: She is alert and oriented to person, place, and time. Psychiatric:         Mood and Affect: Mood normal.         Behavior: Behavior normal.         Thought Content: Thought content normal.         ASSESSMENT/PLAN:  Samira was seen today for 3 month follow-up, migraine and sleep apnea. Diagnoses and all orders for this visit:    Migraine without aura and without status migrainosus, not intractable    CHRISTA (obstructive sleep apnea)    Uncomplicated asthma, unspecified asthma severity, unspecified whether persistent  -     albuterol sulfate HFA (PROVENTIL HFA) 108 (90 Base) MCG/ACT inhaler; Inhale 2 puffs into the lungs every 4 hours as needed for Wheezing    Fatty liver    Bilateral foot pain  -     Zev Brandon [place], Podiatry, Brian Mcgraw    Vaccination not carried out because of patient refusal       Educational materials and/or home exercises printed for patient's review and were included in patient instructions on his/her After Visit Summary and given to patient at the end of visit. Counseled regarding above diagnosis, including possible risks and complications,  especially if left uncontrolled. Counseled regarding the possible side effects, risks, benefits and alternatives to treatment; patient and/or guardian verbalizes understanding, agrees, feels comfortable with and wishes to proceed with above treatment plan. Advised patient to call with any new medication issues, and read all Rx info from pharmacy to assure aware of all possible risks and side effects of medication before taking. Reviewed age and gender appropriate health screening exams and vaccinations.   Advised patient regarding importance of keeping up with recommended health maintenance and to schedule as soon as possible if overdue, as this is important in assessing for undiagnosed pathology, especially cancer, as well as protecting against potentially harmful/life threatening disease. Patient and/or guardian verbalizes understanding and agrees with above counseling, assessment and plan. All questions answered. Return in about 3 months (around 1/18/2022). An  electronic signature was used to authenticate this note.     --Melanie Cuevas,  on 10/18/2021 at 4:13 PM

## 2021-10-25 DIAGNOSIS — M79.671 RIGHT FOOT PAIN: ICD-10-CM

## 2021-10-25 DIAGNOSIS — M79.672 LEFT FOOT PAIN: Primary | ICD-10-CM

## 2021-11-09 ENCOUNTER — OFFICE VISIT (OUTPATIENT)
Dept: PULMONOLOGY | Age: 26
End: 2021-11-09
Payer: COMMERCIAL

## 2021-11-09 VITALS
SYSTOLIC BLOOD PRESSURE: 129 MMHG | HEIGHT: 65 IN | WEIGHT: 293 LBS | BODY MASS INDEX: 48.82 KG/M2 | DIASTOLIC BLOOD PRESSURE: 59 MMHG | OXYGEN SATURATION: 96 % | HEART RATE: 83 BPM | RESPIRATION RATE: 18 BRPM

## 2021-11-09 DIAGNOSIS — E66.01 MORBID OBESITY (HCC): ICD-10-CM

## 2021-11-09 DIAGNOSIS — J45.30 MILD PERSISTENT ASTHMA, UNSPECIFIED WHETHER COMPLICATED: ICD-10-CM

## 2021-11-09 DIAGNOSIS — G47.33 OSA (OBSTRUCTIVE SLEEP APNEA): Primary | ICD-10-CM

## 2021-11-09 LAB
DLCO %PRED: 133 %
DLCO PRED: 25.6 ML/MIN/MMHG
DLCO/VA %PRED: NORMAL
DLCO/VA PRED: NORMAL
DLCO/VA: NORMAL
DLCO: 34.13 ML/MIN/MMHG
EXPIRATORY TIME-POST: NORMAL
EXPIRATORY TIME: NORMAL
FEF 25-75% %CHNG: NORMAL
FEF 25-75% %PRED-POST: NORMAL
FEF 25-75% %PRED-PRE: NORMAL
FEF 25-75% PRED: NORMAL
FEF 25-75%-POST: NORMAL
FEF 25-75%-PRE: NORMAL
FEV1 %PRED-POST: 92 %
FEV1 %PRED-PRE: 84 %
FEV1 PRED: 3.36 L
FEV1-POST: 3.09 L
FEV1-PRE: 2.84 L
FEV1/FVC %PRED-POST: 93 %
FEV1/FVC %PRED-PRE: 89 %
FEV1/FVC PRED: 86 %
FEV1/FVC-POST: 81 %
FEV1/FVC-PRE: 77 %
FVC %PRED-POST: 97 L
FVC %PRED-PRE: 93 %
FVC PRED: 3.94 L
FVC-POST: 3.83 L
FVC-PRE: 3.68 L
GAW %PRED: NORMAL
GAW PRED: NORMAL
GAW: NORMAL
IC %PRED: NORMAL
IC PRED: NORMAL
IC: NORMAL
MEP: NORMAL
MIP: NORMAL
MVV %PRED-PRE: 84 %
MVV PRED: 112 L/MIN
MVV-PRE: 94 L/MIN
PEF %PRED-POST: NORMAL
PEF %PRED-PRE: NORMAL
PEF PRED: NORMAL
PEF%CHNG: NORMAL
PEF-POST: NORMAL
PEF-PRE: NORMAL
RAW %PRED: NORMAL
RAW PRED: NORMAL
RAW: NORMAL
RV %PRED: NORMAL
RV PRED: NORMAL
RV: NORMAL
SVC %PRED: NORMAL
SVC PRED: NORMAL
SVC: NORMAL
TLC %PRED: 0 %
TLC PRED: NORMAL
TLC: NORMAL
VA %PRED: NORMAL
VA PRED: NORMAL
VA: NORMAL
VTG %PRED: NORMAL
VTG PRED: NORMAL
VTG: NORMAL

## 2021-11-09 PROCEDURE — 1036F TOBACCO NON-USER: CPT | Performed by: INTERNAL MEDICINE

## 2021-11-09 PROCEDURE — G8427 DOCREV CUR MEDS BY ELIG CLIN: HCPCS | Performed by: INTERNAL MEDICINE

## 2021-11-09 PROCEDURE — 94060 EVALUATION OF WHEEZING: CPT | Performed by: INTERNAL MEDICINE

## 2021-11-09 PROCEDURE — 99205 OFFICE O/P NEW HI 60 MIN: CPT | Performed by: INTERNAL MEDICINE

## 2021-11-09 PROCEDURE — G8417 CALC BMI ABV UP PARAM F/U: HCPCS | Performed by: INTERNAL MEDICINE

## 2021-11-09 PROCEDURE — G8484 FLU IMMUNIZE NO ADMIN: HCPCS | Performed by: INTERNAL MEDICINE

## 2021-11-09 PROCEDURE — 99203 OFFICE O/P NEW LOW 30 MIN: CPT | Performed by: INTERNAL MEDICINE

## 2021-11-09 RX ORDER — FLUTICASONE FUROATE AND VILANTEROL TRIFENATATE 100; 25 UG/1; UG/1
1 POWDER RESPIRATORY (INHALATION) DAILY
Qty: 1 EACH | Refills: 12 | Status: SHIPPED
Start: 2021-11-09 | End: 2022-02-11

## 2021-11-09 RX ORDER — MONTELUKAST SODIUM 10 MG/1
10 TABLET ORAL NIGHTLY
Qty: 90 TABLET | Refills: 3 | Status: SHIPPED
Start: 2021-11-09 | End: 2022-08-23 | Stop reason: SDUPTHER

## 2021-11-09 RX ORDER — LORATADINE 10 MG/1
10 TABLET ORAL DAILY
Qty: 90 TABLET | Refills: 3 | Status: SHIPPED | OUTPATIENT
Start: 2021-11-09

## 2021-11-09 ASSESSMENT — PULMONARY FUNCTION TESTS
FEV1_POST: 3.09
FEV1_PRE: 2.84
FEV1_PREDICTED: 3.36
FEV1/FVC_PERCENT_PREDICTED_POST: 93
FVC_PERCENT_PREDICTED_PRE: 93
FEV1/FVC_POST: 81
FEV1_PERCENT_PREDICTED_POST: 92
FEV1/FVC_PRE: 77
FEV1/FVC_PERCENT_PREDICTED_PRE: 89
FVC_PRE: 3.68
FEV1_PERCENT_PREDICTED_PRE: 84
FEV1/FVC_PREDICTED: 86
FVC_PREDICTED: 3.94
FVC_PERCENT_PREDICTED_POST: 97
FVC_POST: 3.83

## 2021-11-09 NOTE — PROGRESS NOTES
[facility]  Department of Internal Medicine   Division of Pulmonary, Critical Care and Sleep Medicine  Pulmonary [facility] [phone], Fax [phone]    Calli Grover DO, Philomena Cabrera MD, [facility]  Natalya Noel DO, [facility]      Dear Jay Myers DO    We had the pleasure of seeing Kayla Butt, in the [place] at [facility] regarding her breathing and history of asthma. HISTORY OF PRESENT ILLNESS:    Kayla Butt is a 31 y.o. female [place] descent with a history of asthma PCOS, sleep apnea, fatty liver, history family history of cirrhosis, sent for evaluation due to her breathing difficulties. She states she never been formally diagnosed with asthma but always felt that she had difficulty breathing. She was on different inhalers throughout in the past but was not very compliant she does remember taking Flovent. She has 2 cats and 2 dogs at home but has never had allergy testing or immunotherapy. When seeing her new primary care physician yourself you noticed that she was using albuterol 2 or 3 times a day and felt that this may be at harm which is in center for pulmonary evaluation. She has no history of drug she is single no children she is on birth control pills. He is in customer service. Denies alcohol or tobacco use. Her pulmonary function tests show an FEV1 of only 83% of predicted with a post bronchodilator change of 92%. Recent diagnosis of sleep apnea was also noted and she is maral compliant with her machine.   With the abnormal PFTs and the above symptoms pulmonary assessment, work-up and treatment has been instituted      ALLERGIES:    Allergies   Allergen Reactions    Bee Venom Swelling    Seasonal      Sneezing, itchy watery eyes, congestion    Sumatriptan Other (See Comments)     Reports \"felt like head was on fire\"       PAST MEDICAL HISTORY: Diagnosis Date    Asthma     Elevated liver transaminase level 9/17/2021    Kidney stone     Migraine without aura 9/17/2021    Migraines         MEDICATIONS:   Current Outpatient Medications   Medication Sig Dispense Refill    fluticasone-vilanterol (BREO ELLIPTA) 100-25 MCG/INH AEPB inhaler Inhale 1 puff into the lungs daily 1 each 12    montelukast (SINGULAIR) 10 MG tablet Take 1 tablet by mouth nightly 90 tablet 3    loratadine (CLARITIN) 10 MG tablet Take 1 tablet by mouth daily 90 tablet 3    JUNEL FE 1/20 1-20 MG-MCG per tablet TAKE ONE TABLET BY MOUTH EVERY DAY 28 tablet 5    topiramate (TOPAMAX) 25 MG tablet Take 50 mg by mouth nightly      albuterol sulfate HFA (PROVENTIL HFA) 108 (90 Base) MCG/ACT inhaler Inhale 2 puffs into the lungs every 4 hours as needed for Wheezing 1 each 5    rizatriptan (MAXALT) 5 MG tablet Take one at migraine onset, may repeat once in 2-4 hrs. 12 tablet 3    ondansetron (ZOFRAN) 4 MG tablet Take 1 tablet by mouth every 12 hours as needed for Nausea or Vomiting Associated with  migraine 30 tablet 2    traZODone (DESYREL) 50 MG tablet Take 1 tablet by mouth nightly 90 tablet 1    albuterol (ACCUNEB) 1.25 MG/3ML nebulizer solution INHALE 3 ML (1 VIAL) VIA NEBULIZER EVERY 4 TO 6 HOURS AS NEEDED      fluticasone (FLONASE) 50 MCG/ACT nasal spray 2 sprays by Each Nostril route daily 3 Bottle 1     No current facility-administered medications for this visit. SOCIAL AND OCCUPATIONAL HEALTH:  The patient never smoked  There is no history of TB or TB exposure. There is no asbestos or silica dust exposure. The patient reports no coal, foundry, quarry or [employer] exposure. There is no recent travel history noted. The patient denies a history of recreational or IV drug use. No hot tub exposure. The patient has dogs (2) and cats (2). Hobbies include reading. The patient denies excessive alcohol intake.      SOCIAL HISTORY: Age-appropriate past and current activities are:  Social History     Tobacco Use    Smoking status: Never Smoker    Smokeless tobacco: Never Used   Substance Use Topics    Alcohol use: Yes     Comment: occ    Drug use: Never       SURGICAL HISTORY: No past surgical history on file. FAMILY HISTORY: A review of medical events in the patient's family, including disease which may be hereditary or place the patient at risk were reviewed. Family History   Problem Relation Age of Onset    Migraines Mother       Family Status   Relation Name Status    Mother  (Not Specified)    PGM  (Not Specified)        migraine        REVIEW OF SYSTEMS: The patients health assessment form was reviewed. Constitutional: General health fair . The patient complains of weight changes. The patient denies any recent fevers or weakness. Head: Patient denies any history of trauma, convulsive disorder or syncope. Skin:  Patient denies history of changes in pigmentation, eruptions or pruritus. Eyes: Patient denies any history of color blindness, photophobia, diplopia, inflammation,. She  denies cataracts. Denies glaucoma. Ears: Patient denies history of deafness, tinnitus, pain, discharge or recurrent infections. Nose/Throat: Patient denies history of rhinitis, chronic nasal discharge, drainage, epistaxis, obstruction or nasal polyps. Patient denies history of soreness of mouth or tongue. Patient denies history of hoarseness, voice changes, sore throats or recurrent tonsillitis. Lymphatic:  Patient denies history of enlargement, inflammation, pain or suppuration. She denies history of lymphoma. Cardiovascular:  Patient denies history of palpations, heart murmur, irregular rhythm, chest pain. She denies orthopnea, rheumatic fever, scarlet fever, cold extremities. She denies edema. Pulmonary:  Patient admits to wheezing, dyspnea, exertional dyspnea, nocturnal dyspnea. She complains of cough. She denies hemoptysis or pleurisy.   She complains of sleep disorder. She reports symptoms of sleep apnea. Gastrointestinal: Patient denies changes in appetite. She denies dysphagia, odynophagia or abdominal pain. She denies hematochezia, melena, bowel habit changes or hemorrhoids. Allergy/Immunology: The patient denies itching, hives, or angioedema. The patient admits to a problem with seasonal/environmental allergies. Genitourinary:  The patient denies a history of stones or UTI. Vascular: No history of peripheral vascular disease, carotid occlusive disease or aneurysms. Musculoskeletal: The patient reports a history of arthritis & joint pains. She denies loss of strength. Breasts: She denies history of masses, lumps, pain, or nipple changes. The patient denies a history of breast cancer. Neurological: Patient complains of vertigo. She denies twitching, convulsions, loss of consciousness. No memory problems. Psychological: Patient denies moodiness, depression or anxiety. She denies obsessions, delusions, illusions or hallucinations. Cancer: No history of cancer reported. Endocrine: No history of goiter. No history of thyroid disorders. She denies diabetes. PCOS    Hematopoietic:  She denies history of bleeding disorders or easy bruising. She denies hypercoagulable disorder. Integumentory: No skin lesion, rashes, venous stasis or varicose veins. They denies any report of skin cancer. Rheumatic:  The patient denies polyarthritis or inflammatory joint disease. PHYSICAL EXAMINATION:   Vitals:    11/09/21 1430   BP: (!) 129/59   Pulse: 83   Resp: 18   SpO2: 96%   Weight: 295 lb (133.8 kg)   Height: 5' 5\" (1.651 m)     Constitutional: A  31 y.o. female who is alert, oriented, cooperative and in no apparent distress. Head was normocephalic and atraumatic. EENT: EOMI VALERIA. MMM. No icterus. No conjunctival injections. External canals are patent and no discharge. Septum was midline, mucosa was without erythema, exudates or cobblestoning.   No thrush. Neck: Supple without thyromegaly. No elevated JVP. Trachea was midline. No carotid bruits. Respiratory: Chest was symmetrical without dullness to percussion. Breath sounds bilaterally were clear to auscultation. No wheezes, rhonchi or rales. No intercostal retraction or use of accessory muscles. No egophony noted. Cardiovascular: Nonpalpable PMI. Regular without murmur, clicks, gallops or rubs. No left or right ventricular heave. Pulses:  Carotid, radial and femoral pulses were equal bilaterally. Abdomen: Obese, rounded and soft without organomegaly. No rebound, rigidity. Lymphatic: No lymphadenopathy. Musculoskeletal: Ambulates without assistance. Normal curvature of the spine. No gross muscle weakness. Muscle size, tone and strength are normal. No involuntary movements. Extremities:  No lower extremity edema. No ulcerations, varicosities or erythema. Coordination appears adequate. Sensory function appears intact. Deep tendon reflexes are normal.   Skin:  Warm and dry. Examination of color, turgor and pigmentation was relatively normal. No bruises or skin rashes. Old surgical scars. Neurological/Psychiatric: General behavior, level of consciousness, thought content is normal. Emotional status is normal.  Cranial nerves II-XII are intact. DATA:   The data collected below information that was obtained, reviewed, analyzed and interpreted today. [facility] Office Spirometry was compared to previous test if available and demonstrates an FVC of 3.68 liters which is 93 % of predicted with an FEV1 of 2.84 liters which is 84 % of predicted. FEV1/FVC ratio is 77 %. Mid expiratory flow rates are 63 % of predicted. Maximum voluntary ventilation is 94 liters per minute or 84 % of predicted. Flow volume loop shows no signs of intrathoracic or extrathoracic process.  Impression: Airflow Obstruction with normal DLCO    CHEST RADIOGRAPH: 2021 : Normal          IMPRESSION:      Lize Harsha Galindo is a 31 y.o. female with mild persistent asthma and abnormal pulmonary function test with along with sleep apnea. With this in mind, we would like to proceed with the following;                      PLAN:    I asked her to undergo some testing which includes a total IgE, and allergy immune assessment. As per education and avoidance therapy was outlined. I did explain to her as needed using albuterol more than 2 times a night or 3 times per week is detrimental.  Because of this ongoing issue I started her on Breo once a day at 100 mcg daily the reason for this is compliance as it is once a day to be used in the morning. She was instructed on proper use and to gargle after use. In addition I put her back on Singulair 10 mg at night and loratadine as needed. Weight loss would be extremely beneficial and help control her asthma. As well it is well-known that sleep apnea that is controlled will also help control her symptoms. She was still told that he can use her albuterol as needed and I will see her back in 1 month to review her blood work and to see how she is doing on her new bronchodilator regiment. Vaccines were reviewed she was offered the influenza vaccine and refused due to fear. We hope this updates you on our evaluation and clinical thinking. Thank you for entrusting us to participate in Leidy Marcell care. If you have any questions, please don't hesitate to call us at the [facility]. Sincerely,      Ester Phelps D.O., MPH, Aicha Nicole  Professor of Internal Medicine  Director of [facility]      During today's visit  Laura Faith is a 31 y.o. female was seen, examined and discussed. This is confirmation that I have personally seen and examined the patient and that the key elements of the encounter were performed by me (> 85 % time). The medications & laboratory data was discussed and adjusted where necessary.  The radiographic images were reviewed or with radiologist or consultant if felt dis-concordant with the exam or history. The above findings were corroborated, plans confirmed and changes made if needed. Family is updated at the bedside as available. Key issues of the case were discussed among consultants. Critical Care time is documented if appropriate.

## 2021-11-18 ENCOUNTER — OFFICE VISIT (OUTPATIENT)
Dept: SLEEP MEDICINE | Age: 26
End: 2021-11-18
Payer: COMMERCIAL

## 2021-11-18 VITALS
DIASTOLIC BLOOD PRESSURE: 77 MMHG | RESPIRATION RATE: 20 BRPM | BODY MASS INDEX: 48.82 KG/M2 | SYSTOLIC BLOOD PRESSURE: 122 MMHG | HEART RATE: 79 BPM | OXYGEN SATURATION: 97 % | WEIGHT: 293 LBS | HEIGHT: 65 IN

## 2021-11-18 DIAGNOSIS — G47.00 INSOMNIA, UNSPECIFIED TYPE: ICD-10-CM

## 2021-11-18 DIAGNOSIS — E66.9 OBESITY, UNSPECIFIED CLASSIFICATION, UNSPECIFIED OBESITY TYPE, UNSPECIFIED WHETHER SERIOUS COMORBIDITY PRESENT: ICD-10-CM

## 2021-11-18 DIAGNOSIS — G47.33 OBSTRUCTIVE SLEEP APNEA: Primary | ICD-10-CM

## 2021-11-18 PROCEDURE — 99214 OFFICE O/P EST MOD 30 MIN: CPT | Performed by: STUDENT IN AN ORGANIZED HEALTH CARE EDUCATION/TRAINING PROGRAM

## 2021-11-18 PROCEDURE — G8484 FLU IMMUNIZE NO ADMIN: HCPCS | Performed by: STUDENT IN AN ORGANIZED HEALTH CARE EDUCATION/TRAINING PROGRAM

## 2021-11-18 PROCEDURE — G8427 DOCREV CUR MEDS BY ELIG CLIN: HCPCS | Performed by: STUDENT IN AN ORGANIZED HEALTH CARE EDUCATION/TRAINING PROGRAM

## 2021-11-18 PROCEDURE — 1036F TOBACCO NON-USER: CPT | Performed by: STUDENT IN AN ORGANIZED HEALTH CARE EDUCATION/TRAINING PROGRAM

## 2021-11-18 PROCEDURE — G8417 CALC BMI ABV UP PARAM F/U: HCPCS | Performed by: STUDENT IN AN ORGANIZED HEALTH CARE EDUCATION/TRAINING PROGRAM

## 2021-11-18 ASSESSMENT — SLEEP AND FATIGUE QUESTIONNAIRES
HOW LIKELY ARE YOU TO NOD OFF OR FALL ASLEEP WHILE SITTING AND TALKING TO SOMEONE: 0
HOW LIKELY ARE YOU TO NOD OFF OR FALL ASLEEP WHILE SITTING QUIETLY AFTER LUNCH WITHOUT ALCOHOL: 0
HOW LIKELY ARE YOU TO NOD OFF OR FALL ASLEEP WHILE LYING DOWN TO REST IN THE AFTERNOON WHEN CIRCUMSTANCES PERMIT: 0
HOW LIKELY ARE YOU TO NOD OFF OR FALL ASLEEP WHILE SITTING INACTIVE IN A PUBLIC PLACE: 0
ESS TOTAL SCORE: 1
HOW LIKELY ARE YOU TO NOD OFF OR FALL ASLEEP WHEN YOU ARE A PASSENGER IN A CAR FOR AN HOUR WITHOUT A BREAK: 1
HOW LIKELY ARE YOU TO NOD OFF OR FALL ASLEEP WHILE SITTING AND READING: 0
HOW LIKELY ARE YOU TO NOD OFF OR FALL ASLEEP IN A CAR, WHILE STOPPED FOR A FEW MINUTES IN TRAFFIC: 0
HOW LIKELY ARE YOU TO NOD OFF OR FALL ASLEEP WHILE WATCHING TV: 0

## 2021-11-18 NOTE — PROGRESS NOTES
[facility] Sleep Medicine    Patient Name: Geraldine Keating  Age: 31 y.o.   : 1995    Date of Visit: 21        Review of Last Visit Summary:    The patient was last seen on 2021 for Obstructive Sleep Apnea, insomnia, and Obesity. Interim History:     Geraldine Keating is a 31 y.o. female in office for follow up. Patient is doing well. Benefits: Reduced headache, better quality sleep  DME: Medical service company    Compliance download report reviewed today by me demonstrated the following:    Dates 10/9/2021 till 2021 incomplete data download with limited information    Settings -APAP 4-15 presumed  Days used -25 out of 30  >4 hours-83%  AHI -AHI consistently under 10 and likely under 5  Leak -2.3      Past Medical History:  Past Medical History:   Diagnosis Date    Asthma     Elevated liver transaminase level 2021    Kidney stone     Migraine without aura 2021    Migraines        Past Surgical History:    No past surgical history on file. Allergies:  is allergic to bee venom, seasonal, and sumatriptan.   Social History:    Social History     Tobacco Use    Smoking status: Never Smoker    Smokeless tobacco: Never Used   Substance Use Topics    Alcohol use: Yes     Comment: occ    Drug use: Never        Family History:       Problem Relation Age of Onset    Migraines Mother        Current Medications:    Current Outpatient Medications:     topiramate (TOPAMAX) 25 MG tablet, TAKE ONE TABLET BY MOUTH IN THE MORNING AND NIGHT, Disp: 60 tablet, Rfl: 3    fluticasone-vilanterol (BREO ELLIPTA) 100-25 MCG/INH AEPB inhaler, Inhale 1 puff into the lungs daily, Disp: 1 each, Rfl: 12    montelukast (SINGULAIR) 10 MG tablet, Take 1 tablet by mouth nightly, Disp: 90 tablet, Rfl: 3    loratadine (CLARITIN) 10 MG tablet, Take 1 tablet by mouth daily, Disp: 90 tablet, Rfl: 3    JUNEL FE  1-20 MG-MCG per tablet, TAKE ONE TABLET BY MOUTH EVERY DAY, Disp: 28 tablet, Rfl: 5    albuterol sulfate HFA (PROVENTIL HFA) 108 (90 Base) MCG/ACT inhaler, Inhale 2 puffs into the lungs every 4 hours as needed for Wheezing, Disp: 1 each, Rfl: 5    rizatriptan (MAXALT) 5 MG tablet, Take one at migraine onset, may repeat once in 2-4 hrs., Disp: 12 tablet, Rfl: 3    ondansetron (ZOFRAN) 4 MG tablet, Take 1 tablet by mouth every 12 hours as needed for Nausea or Vomiting Associated with  migraine, Disp: 30 tablet, Rfl: 2    traZODone (DESYREL) 50 MG tablet, Take 1 tablet by mouth nightly, Disp: 90 tablet, Rfl: 1    albuterol (ACCUNEB) 1.25 MG/3ML nebulizer solution, INHALE 3 ML (1 VIAL) VIA NEBULIZER EVERY 4 TO 6 HOURS AS NEEDED, Disp: , Rfl:     fluticasone (FLONASE) 50 MCG/ACT nasal spray, 2 sprays by Each Nostril route daily, Disp: 3 Bottle, Rfl: 1          Objective:   PHYSICAL EXAM:    /77 (Site: Left Upper Arm, Position: Sitting, Cuff Size: Large Adult)   Pulse 79   Resp 20   Ht 5' 5\" (1.651 m)   Wt 294 lb 14.4 oz (133.8 kg)   SpO2 97%   BMI 49.07 kg/m²       (Sleep ROS above)     Constitutional: no chills, no fever   Eyes: no blurred vision and no eyesight problems. ENT: no hoarseness and no sore throat. Cardiovascular: no chest pain,   Respiratory: no cough, no shortness of breath   Gastrointestinal:  no nausea,  no vomiting, no diarrhea. Musculoskeletal: no arthralgias, no back pain and no myalgias. Integumentary: no rashes or lacerations. Neurological:  no diplopia, no dizziness,  no headache, no memory changes,  and no tingling. Endocrine: No chills      Physical exam:  Gen: No acute distress. BMI of Body mass index is 49.07 kg/m². Eyes: PERRL. No sclera icterus. No conjunctival injection. ENT: No nasal/oral discharge. Pharynx clear. Neck: Trachea midline. No obvious mass. Neck circumference     Resp: No accessory muscle use. No crackles. No wheezes. No rhonchi. CV: Regular rate. Regular rhythm. No murmur or rub. Skin: Warm and dry.  No bleeding observed   M/S: No cyanosis. No obvious joint deformity. Neuro: Awake. Alert. Moves all four extremities. Psych: Alert and oriented. No anxiety. Sleep Medicine 11/18/2021 8/19/2021   Sitting and reading 0 2   Watching TV 0 1   Sitting, inactive in a public place (e.g. a theatre or a meeting) 0 0   As a passenger in a car for an hour without a break 1 3   Lying down to rest in the afternoon when circumstances permit 0 0   Sitting and talking to someone 0 0   Sitting quietly after a lunch without alcohol 0 0   In a car, while stopped for a few minutes in traffic 0 0   Total score 1 6   Neck circumference - 18       DATA:     Split Night Sleep Study:   Study date: 8/4/2021  AHI:  52.9   Oxygen Manjeet: 55.0      Assessment:      Samira was seen today for sleep apnea. Diagnoses and all orders for this visit:    Obstructive sleep apnea  -     DME Order for CPAP as OP    Obesity, unspecified classification, unspecified obesity type, unspecified whether serious comorbidity present  -     Ambulatory referral to Bariatrics    Insomnia, unspecified type    ·    Plan:       1. Obstructive Sleep Apnea. Patient tolerating Pap therapy without significant difficulty. ESS reduced to 1. SPO2 improvement noted during titration study. Continue current therapy. Follow-up in 5 months.  - Continue current PAP settings    2. Insomnia. Still on Trazodone as prescribed by Dr. Lali Mack. Patient states that her current regimen works for her and helps her fall asleep with Pap therapy. 3. Obesity. Body mass index is 49.07 kg/m². Referral to medical weight loss with Dr. Francisca Staley.  -Healthy weight loss advised        Follow up: Return in about 5 months (around 4/18/2022) for Follow up CHRISTA.

## 2021-11-18 NOTE — PATIENT INSTRUCTIONS
need to adjust your mask. Difficulty tolerating the PAP mask - Contact your equipment company to try a different mask, we can order a \"mini sleep study\"with the sleep tech to try different masks/settings of pressure, also we have a sleep psychologist to help with anxiety related to wearing the PAP mask      ----------------------------------------------------------     For Questions and Concerns: In case of difficulty with your PAP device or mask interface, please FIRST contact your [address] (The company who provides you the CPAP/BiPAP/ASV machine/supplies). If you need the number for any other DME company, please call my Nurse at [phone] option 2     For questions concerning your [place] appointment: Call [phone] option 2  SLEEP LAB SCHEDULING:        ----------------------------------------------------------     Helpful Web Sites: For patients with ALL SLEEP DISORDERS: American Academy of Sleep Medicine http://sleepeducation. org; or [employer]: https://sleepfoundation. org  For patients with CHRISTA: American Sleep Apnea Association: http://robertson.org/. org  For patients with RLS: RLS Foundation: Ousmane.esperanza  For patients with INSOMNIA: https://www.keith.org/. org/articles/sleep/insomnia-causes-and-cures. htm  For patients with DEPRESSION: Workfolio.com.[employer]. [employer]/depression            Learning About CPAP for Sleep Apnea  What is CPAP? CPAP/Bi-PAP is a small machine that you use at home every night while you sleep. It increases air pressure in your throat to keep your airway open. When you have sleep apnea, this can help you sleep better so you feel much better. CPAP stands for \"continuous positive airway pressure. \" Bi-PAP is a different PAP setting that allows for different pressures when you breathe in and out.    The CPAP/Bi-PAP machine will have one of the following:  · A mask that covers your nose and mouth  · Prongs that fit into your nose  · A mask that covers your nose only, the most common type. This type is called NCPAP. The N stands for \"nasal.\"  Why is it done? CPAP is a common/effective treatment for obstructive sleep apnea. How does it help? · CPAP can help you have more normal sleep, so you feel less sleepy and more alert during the daytime through the treatment of sleep apnea. · CPAP may help keep heart failure or other heart problems from getting worse. · CPAP may help lower your blood pressure. · If you use CPAP, your bed partner may also sleep better because you are snoring less. What are the side effects? Some people who use CPAP have:  · A dry or stuffy nose and a sore throat. · Irritated skin on the face. · Sore eyes. · Bloating. If you have any of these problems, work with your doctor to fix them. Here are some things you can try:  · Be sure the mask or nasal prongs fit well. · See if your doctor can adjust the pressure of your CPAP. · If your nose is dry, try a humidifier. If these things do not help, you might try a different type of machine. Some machines have air pressure that adjusts on its own. Others have air pressures that are different when you breathe in than when you breathe out. This may reduce discomfort caused by too much pressure in your nose. Where can you learn more? Go to https://chpeericeb.Fazland. org and sign in to your [employer] account. Enter E059 in the Flimmer box to learn more about \"Learning About CPAP for Sleep Apnea. \"     If you do not have an account, please click on the \"Sign Up Now\" link. Current as of: February 24, 2020               Content Version: 12.5  © 5334-4319 Healthwise, Incorporated. Care instructions adapted under license by [facility] ([facility]).  If you have questions about a medical condition or this instruction, always ask your healthcare professional. [address] any warranty or liability for your use of this information. The general categories for the treatment of Sleep Apnea (including benefits and potential side effects) include:     1. Supportive Care  -Elevate the head of the bed   -Avoid sleeping on your back      2. Weight loss  -Start a healthy weight loss program  -Consider a referral to Weight Loss Medicine Clinic  -Benefits: Multiple health benefits  -Side Effects: Depends on treatment type     3. Oral Appliance \"Mouth Piece\"  (Mandibular Advancement Device)  -Benefits: CPAP alternative. Decreased snoring, improved sleep quality, decreased sleepiness.  -Side Effects: teeth crowding, oral/dental pain. Regular visits with dentist are advised to watch for side effects. 4. Positive pressure therapy with a machine and a mask (CPAP or BiPAP)  Benefits: Decreased snoring, improved sleep quality, decreased sleepiness. Risks: Aerophagia (swallowing air), dry nose/nose bleeds, dry eyes (due to leak), skin sores/rash (due to mask)     5. Different kinds of surgeries, including nasal surgery, surgery of the throat/windpipe, and nerve stimulation therapy (INSPIRE). Benefits: CPAP alternative. Decreased snoring, improved sleep quality, decreased sleepiness. Risks: Bleeding, infection, nerve damage      Sleep Hygiene Guidelines  Good dental hygiene is important in determining the health of your teeth and gums. Similarly, good sleep hygiene is important in determining the quality and quantity of your sleep. Review the below guidelines and check the ones you think you might be breaking. 1. Screen time: Turn off TV, computers, tablets, and smart phones 1 hour Before Bedtime  The short waves of blue light (emitted from the screens of TVs, laptops, iPads, smart phones, etc.) mimic daylight. Thinking its daytime, your brain suppresses melatonin and becomes more alert because we have evolved to see this type of light only during the day.  Whats more, the overall stimulation we get from these devices serves to keep us more alert. If TV is your relaxing activity, try to move it up a bit earlier in the evening. The TV should be in a separate room - NOT your bedroom. 2. Caffeine: Avoid Caffeine 6-8 Hours Before Bedtime   Caffeine disturbs sleep, even in people who dont think they experience a stimulation effect.  Individuals with insomnia are often more sensitive to mild stimulants than are normal sleepers.  Caffeine is found in items such as coffee, tea, soda, chocolate, and many over-the-counter medications (e.g., Excedrin).  Caffeine should be avoided in the afternoon and evening, preferably taken no later than 1pm. You might consider a trial period of no caffeine at all. 3. Nicotine: Avoid Nicotine Before Bedtime   Although some smokers claim that smoking helps them relax, nicotine is a stimulant.  The initial relaxing effects occur with the initial entry of the nicotine, but as the nicotine builds in the body, it produces an effect similar to caffeine.  Nicotine should be avoided near bedtime and during the night. Dont smoke to get yourself back to sleep. 4. Alcohol: Avoid Alcohol After Dinner   Alcohol often promotes the onset of sleep, but as alcohol is metabolized during the night, sleep becomes disturbed and fragmented, leading to poor sleep quality.  Limit alcohol use to 1 beer or glass of wine per day for women, 2  drinks per day for men. 5. Sleeping Pills: Sleep Medications are Effective Only Temporarily   Research has shown that sleep medications lose their effectiveness in about 2 - 4 weeks when taken regularly.  Over time, sleeping pills actually can make sleep problems worse. When sleeping pills have been used over a long period, withdrawal from the medication can lead to an insomnia rebound. Thus, after long- term use, many individuals incorrectly conclude that they need sleeping pills in order to sleep normally.    Keep use of sleep meds infrequent, but dont worry if you need to use one on an occasional basis. (And always consult with your doctor first if you decide to make changes to your medication regimen.)     6. Regular Exercise   Exercise has been shown to aid sleep, although the positive effect often takes several weeks to become noticeable.  However, exercise earlier in the day if possible. Exercise within 2 hours of bedtime may elevate nervous system activity and interfere with sleep onset.  Get regular exercise, preferably at least 20 minutes each day of an activity that causes sweating. 7. Hot Baths   Spending 20 minutes in a tub of hot water an hour or two prior to bedtime may promote sleep. 8.Bedroom Environment: Moderate Temperature, Quiet, and Dark   Extremes of heat or cold can disrupt sleep. Keep your room at a comfortable temperature.  Noises can be masked with background white noise (such as the noise of a fan) or with earplugs.  Bedrooms may be darkened with black-out shades or sleep masks can be worn.  Position clocks out-of-sight since clock-watching can increase worry about the effects of lack of sleep. 9. Eating   A light bedtime snack, such a glass of warm milk, cheese, or a bowl of cereal can promote sleep.  Avoid heavy or spicy meals before bedtime and any caffeinated foods (e.g., chocolate).  Avoid snacks in the middle of the night since awakening may become associated with hunger.  Do not go to bed too hungry or too full. 10. Avoid/Reduce Naps   The sleep you obtain during the day takes away from your sleep need at night - resulting in lighter, more restless sleep, difficulty falling asleep, and/or early morning awakening.  Avoid naps. If you must nap, keep it brief. It is best to set an alarm to ensure you dont sleep more than 15 minutes. 11. Regular Sleep Schedule   Keep a regular time each day, 7 days a week, to get out of bed.  Keeping a regular awakening time helps set your circadian rhythm so that your body learns to sleep at the desired time.

## 2021-12-06 ENCOUNTER — TELEPHONE (OUTPATIENT)
Dept: BARIATRICS/WEIGHT MGMT | Age: 26
End: 2021-12-06

## 2021-12-13 DIAGNOSIS — J45.30 MILD PERSISTENT ASTHMA, UNSPECIFIED WHETHER COMPLICATED: Primary | ICD-10-CM

## 2021-12-13 RX ORDER — DILTIAZEM HYDROCHLORIDE 60 MG/1
2 TABLET, FILM COATED ORAL 2 TIMES DAILY
Qty: 10.2 G | Refills: 3 | Status: SHIPPED
Start: 2021-12-13 | End: 2022-04-11

## 2022-01-08 DIAGNOSIS — G47.00 INSOMNIA, UNSPECIFIED TYPE: ICD-10-CM

## 2022-01-12 RX ORDER — TRAZODONE HYDROCHLORIDE 50 MG/1
TABLET ORAL
Qty: 90 TABLET | Refills: 0 | Status: SHIPPED
Start: 2022-01-12 | End: 2022-01-14 | Stop reason: DRUGHIGH

## 2022-01-12 NOTE — TELEPHONE ENCOUNTER
Last Appointment:  10/18/2021  Future Appointments   Date Time Provider Alyssa Grideri   1/14/2022  3:40 PM Bebo Carrero MD [facility]   2/11/2022  3:00 PM Marline Renteria MD Surg [facility]   4/21/2022  1:40 PM DO UGO Rosa

## 2022-01-13 DIAGNOSIS — N20.0 NEPHROLITHIASIS: Primary | ICD-10-CM

## 2022-01-14 ENCOUNTER — VIRTUAL VISIT (OUTPATIENT)
Dept: NEUROLOGY | Age: 27
End: 2022-01-14
Payer: COMMERCIAL

## 2022-01-14 DIAGNOSIS — G43.009 MIGRAINE WITHOUT AURA AND WITHOUT STATUS MIGRAINOSUS, NOT INTRACTABLE: Primary | ICD-10-CM

## 2022-01-14 PROCEDURE — G8427 DOCREV CUR MEDS BY ELIG CLIN: HCPCS | Performed by: PSYCHIATRY & NEUROLOGY

## 2022-01-14 PROCEDURE — 99214 OFFICE O/P EST MOD 30 MIN: CPT | Performed by: PSYCHIATRY & NEUROLOGY

## 2022-01-14 RX ORDER — TRAZODONE HYDROCHLORIDE 50 MG/1
TABLET ORAL
Qty: 45 TABLET | Refills: 3 | Status: SHIPPED
Start: 2022-01-14 | End: 2022-03-11

## 2022-01-14 NOTE — PROGRESS NOTES
[address]  4.2 NEUROLOGY  [address] [facility] [address]  Dept: [phone]  Loc: [phone]    Telehealth follow up Note      Jacey Og     Date of Visit:  1/14/2022  Primary Provider: Miquel Goltz, DO    CC:  Telehealth Neurology follow up, migraine headaches without a visual aura. Chief Complaint   Patient presents with    Migraine     c/o pt states she has notice improvement, but states she was at ER for kidney stones, states she was informed she may need to d/c topiramate due to kidney stones       Consent:  The patient and/or health care decision maker is aware that that he may receive a bill for this tele-health service [employer], depending on his insurance coverage, and has provided verbal consent to proceed: Yes  My patient is aware that they will need a follow-up visit (in-person or virtually) at the appropriate time indicated for continued medications. Further, my patient is aware that when this acute crisis has lifted, they will be expected to return for an in-person visit and all elements of standard local and hospital guidelines in order to continue this medication. HPI: 41-year-old woman with history of migraines not associated with visual aura of childhood onset, no hemicranial preference. Recent diagnosis of kidney stones reported thus will need to discontinue topiramate. Headche pain is better, topiramate helped but dx with kidney stones, discontinue. Appropriate analgesia with current medications regimen: yes - will increase trazodone to 75 mg daily-did previously report this helped to reduce migraines. Change in quality of symptoms:no. Medication side effects: kidney stones; ports past remote history of kidney stones.   Recent diagnostic testing: With urology    Lab data: Patient will provide recent lab reports from other facility    Imaging data: none recent       Past Medical History: Reviewed    Past Surgical History: Reviewed     Home Medications: Reviewed    Current Outpatient Medications   Medication Sig Dispense Refill    traZODone (DESYREL) 50 MG tablet Take 1 1/2 tabs at bedtime 45 tablet 3    SYMBICORT 80-4.5 MCG/ACT AERO Inhale 2 puffs into the lungs 2 times daily 10.2 g 3    montelukast (SINGULAIR) 10 MG tablet Take 1 tablet by mouth nightly 90 tablet 3    loratadine (CLARITIN) 10 MG tablet Take 1 tablet by mouth daily 90 tablet 3    JUNEL FE 1/20 1-20 MG-MCG per tablet TAKE ONE TABLET BY MOUTH EVERY DAY 28 tablet 5    albuterol sulfate HFA (PROVENTIL HFA) 108 (90 Base) MCG/ACT inhaler Inhale 2 puffs into the lungs every 4 hours as needed for Wheezing 1 each 5    rizatriptan (MAXALT) 5 MG tablet Take one at migraine onset, may repeat once in 2-4 hrs. 12 tablet 3    ondansetron (ZOFRAN) 4 MG tablet Take 1 tablet by mouth every 12 hours as needed for Nausea or Vomiting Associated with  migraine 30 tablet 2    albuterol (ACCUNEB) 1.25 MG/3ML nebulizer solution INHALE 3 ML (1 VIAL) VIA NEBULIZER EVERY 4 TO 6 HOURS AS NEEDED      fluticasone (FLONASE) 50 MCG/ACT nasal spray 2 sprays by Each Nostril route daily 3 Bottle 1    fluticasone-vilanterol (BREO ELLIPTA) 100-25 MCG/INH AEPB inhaler Inhale 1 puff into the lungs daily 1 each 12     No current facility-administered medications for this visit. Allergies: Reviewed     Social History: Reviewed     REVIEW OF SYSTEMS:     Samira denies fever/chills, chest pain, shortness of breath, new bowel or bladder complaints. All other review of systems was negative. VIRTUAL NEUROLOGIC EXAMINATION:      General:      Build:Overweight  A & O x3  Speech/language: Clear, fluent  CN II-XII: Remains grossly intact throughout. Pupils appear equal and reactive to light. EOMs appear full without nystagmus. Visual fields are grossly full. Facial expression and sensation are normal and symmetric. Hearing is grossly intact. The tongue is midline.   Motor/Sensory Exam: No focal weakness. No focal sensory or numbness complaints. Coordination: No limb dysmetria or gait ataxia  Gait:normal    HEENT:    Head:normocephalic and atraumatic  Pupils:normal, regular, round and equal.  Sclera: icterus absent    Lungs:    Breathing:Normal expansion. Clear to auscultation. No rales, rhonchi, or wheezing. Musculoskeletal:    SLR:not done right, not done left, sitting     Extremities:    Tremors:None bilaterally upper and lower  Range of motion:Generally normal     Dermatology:    Skin:no skin lesions    Impression/Plan:  1. Chronic episodic migraines not associated with a visual aura without hemicranial preference. Unfortunately she was diagnosed recently with kidney stones and is undergoing a urological work-up. She reports a past remote history of kidney stones in the past.  Since she was prescribed topiramate for migraines which was helpful, have advised she stop topiramate altogether and made a minor increase of trazodone to 75 mg at bedtime which she also reported helped to improve her migraines. We also discussed other migraine treatment options of Nurtec or one of the monthly injectable CGRPtargeted therapies such as Emgality or Aimovig which would require insurance authorization. 2. Follow-up with me by means of virtual video visit preferred within 2 months. Patient advised regarding steps to help prevent the spread of COVID-19   SOURCE - https://bruce-ryder.info/. html     1-Stay home except to get medical care  2-Clean your hands often for at least 20 seconds, avoid touching: Avoid touching your eyes, nose, and mouth with unwashed hands. 3-Seek medical attention: Seek prompt medical attention if your illness is worsening (e.g., difficulty breathing).   Call you doctor first.  3-Wear a facemask if you are sick   4-Cover your coughs and sneezes           I affirm this is a Patient Initiated Episode with an established Patient who has not had a related appointment within my department in the past 7 days or scheduled within the next 24 hours. Note: This visit was completed virtually using DOXY. ME    Total Time: 25 mins.     Patient location: Home  Provider location: Office    Cc: Referring physician    Maryse Rubin M.D.

## 2022-02-01 ENCOUNTER — PATIENT MESSAGE (OUTPATIENT)
Dept: NEUROLOGY | Age: 27
End: 2022-02-01

## 2022-02-02 NOTE — TELEPHONE ENCOUNTER
From: Maya Madrid  To: Dr. Herberth Curry: 2/1/2022 4:43 PM EST  Subject: Appointment question     Is this a tele health appointment?

## 2022-02-11 ENCOUNTER — OFFICE VISIT (OUTPATIENT)
Dept: BARIATRICS/WEIGHT MGMT | Age: 27
End: 2022-02-11
Payer: COMMERCIAL

## 2022-02-11 VITALS
TEMPERATURE: 98.2 F | HEIGHT: 64 IN | WEIGHT: 287.2 LBS | BODY MASS INDEX: 49.03 KG/M2 | HEART RATE: 72 BPM | DIASTOLIC BLOOD PRESSURE: 56 MMHG | SYSTOLIC BLOOD PRESSURE: 132 MMHG

## 2022-02-11 DIAGNOSIS — E66.01 CLASS 3 SEVERE OBESITY DUE TO EXCESS CALORIES WITH SERIOUS COMORBIDITY AND BODY MASS INDEX (BMI) OF 50.0 TO 59.9 IN ADULT (HCC): ICD-10-CM

## 2022-02-11 DIAGNOSIS — G43.009 MIGRAINE WITHOUT AURA AND WITHOUT STATUS MIGRAINOSUS, NOT INTRACTABLE: Chronic | ICD-10-CM

## 2022-02-11 DIAGNOSIS — G47.33 OSA (OBSTRUCTIVE SLEEP APNEA): ICD-10-CM

## 2022-02-11 DIAGNOSIS — R53.82 CHRONIC FATIGUE: Primary | ICD-10-CM

## 2022-02-11 PROBLEM — J45.909 ASTHMA: Status: ACTIVE | Noted: 2022-02-11

## 2022-02-11 PROBLEM — E66.813 CLASS 3 SEVERE OBESITY DUE TO EXCESS CALORIES WITH SERIOUS COMORBIDITY AND BODY MASS INDEX (BMI) OF 50.0 TO 59.9 IN ADULT: Status: ACTIVE | Noted: 2022-02-11

## 2022-02-11 PROCEDURE — 99205 OFFICE O/P NEW HI 60 MIN: CPT | Performed by: INTERNAL MEDICINE

## 2022-02-11 PROCEDURE — 99202 OFFICE O/P NEW SF 15 MIN: CPT

## 2022-02-11 NOTE — PATIENT INSTRUCTIONS
Rules:  · Count every calorie every day  · Limit sweets to one day per month  · Limit chips/crackers/pretzels/nuts/popcorn to 100 kar/day  · Eliminate all sugar sweetened beverages (including fruit juice)  · Limit restaurants (including fast food and food from a convenience store) to one time every two weeks while in town    Requirements:  · Make sure protein intake is at least 65 grams per day (do not count protein every day; instead spot check your intake every 2-3 weeks and make sure what you think you are getting is close to accurate; consider using a protein shake if needed; these are in the pharmacy section of the stores, not the grocery section; Premier, Pure Protein and Fairlife are relatively inexpensive and taste good to most patients; other options are Nectar, Boost Max, Ensure Max, BeneProtein and GNC lean (which is lactose-free); Nectar fruit, Premier Protein Clear, IsoPure Protein Drink, and Protein 2 O are water-based options; Quest (or Cosco, which is cheaper and is ordered on [employer]) and the Spock protein bars can also be used, but have less protein in them )  (Disclaimer: Dietary supplements rarely have their listed ingredients and the amount of each verified by a third party other. Sometimes they give verification for their claims to be GMO and gluten free and to be organic. However, even such verifications as these may still be untrustworthy.) (<200 Kar, >25g protein)    · Make sure that fiber intake is at least 22 grams per day. Do this by either eating 12 tablespoons of the original, plain Fiber One cereal every day or 4 tablespoons of wheat dextrin powder (Benefiber or a generic brand) every day. Work up to this amount slowly by starting with only one-eighth to one-fourth of the target amount and then adding another one-eighth to one-fourth every one or two weeks until reaching the target.     · Take one multivitamin every day    Targets:  · Limit calorie intake to 1500 calories/day  · Walk 30 minutes daily or equivalent  · Avoid eating 2 hours within bedtime. Tips:  · Do not eat outside of the dining room or the kitchen  · Do not eat while watching TV, videos, working on the computer or using a smart phone  · Do not eat food out of a multi-serving bag or container. · Establish 6 hours of food-free \"time-out\" periods (times you don't eat) each day. No period can be less than 1 hour long. The periods need to be the same every day for days that are the same (for example, workdays would have one set of food free periods and weekends would have another set of days). These six hours are in addition to the two hours before bedtime and the time spent sleeping. Food (per 100 g) Calories Fibers T. Carbohydrates Protein Fat Sodium (mg) Potassium (mg)   Green Bell Peppers 10 1.7 4.6 0.9 0.2 3 175   Cucumbers 15 0.5 3.6 0.7 0.1 2 147   Celery 16 1.6 3 0.7 0.2 80 260   Tomatoes 18 1.2 3.9 0.9 0.2 5 237   Carrots 41 2.8 10 0.9 0.2 69 320   Cabbage 25 2.5 6 1.3 0.1 18 170   Broccoli 35 3.3 7.2 2.4 0.4 41 293   Cauliflower 23 2.3 4.1 1.8 0.5 15 142   Iceberg Lettuce 14 1.2 3 0.9 0.1 10 141   Kale 28 2 5.6 1.9 0.4 23 228   Brussel Sprouts 43 3.8 9 3.4 0.3 25 389   Spinach 23 2.4 3.8 3 0.3 70 466   Zucchini 15 1 2.7 1.1 0.4 3 264   Mushroom 28 2.2 5.3 2.2 0.5 2 356   Asparagus 22 2 4.1 2.4 0.2 14 224   Green Beans 35 3.2 7.9 1.9 0.3 1 146   Eggplant 35 2.5 8.7 0.8 0.2 1 123       Return to see me in 4-6 weeks.

## 2022-02-11 NOTE — PROGRESS NOTES
CC -   CHRISTA, weight gain, fatigue - better after CPAP (started ~10/2021)    BACKGROUND -     First visit: 2/11/22     Obesity (all weight in lbs)  Began in childhood  Initial BMI 50.08, Wt 287.2  HS Grad wt 270  Lowest   wt 253  Highest  wt 305  Pattern of wt gain: gradual  Wt change past yr: -12 lbs  Most wt lost: 50 lbs  Other diets attempted: chicken/rice/vegetables, lots of water, gym. Desire to lose weight: 10/10  Problem posed by appetite: 2-3/10    Initial Diet:    Number of meals per day - 2    Number of snacks per day - 2    Meal volume - 12\" plate,  occasional seconds    Fast food/convenience store - 0-1x/week (for the last 2 months)    Restaurants (not fast food) - 0x/week   Sweets - 1d/week   Chips - 1-2d/week (Doritos, lays etc)   Crackers/pretzels - 2-3d/week   Nuts - 0-1d/week   Peanut Butter - 1d/week   Popcorn - 0d/week   Dried fruit - 1d/week   Whole fruit - 7d/week (apples/bananas/blueberries/cherries/grapes etc 6\"X1.5\" container)   Breakfast cereal - 1d/week (coconut or oat milk)   Granola/Protein/Energy bar - 1-3d/week (nature valley triscuit (alternating with crackers))   Sugar sweetened beverages - apple/orange juice <1/wk. Tea with honey 1x/wk, stopped coffee. Protein - No supplements   Fiber - No supplements     Initial Exercise:    Gym membership - [employer] in [facility]    Walking - not much    Running - no    Resistance - no (some wt lifting started 2 days ago). Aerobic class - no        Initial Sleep: Bedtime: 10pm, wake up time: 5:30-6 am after starting CPAP has been feeling rested, daytime naps: no    Weight scale at home: yes, takes weight: once every 2wks  Food scale: yes  ______________________    [facility] -  Past Medical History:   Diagnosis Date    Asthma     Elevated liver transaminase level 9/17/2021    Kidney stone     Migraine without aura 9/17/2021    Migraines    CHRISTA, PCOS    No past surgical history on file.      Prior to Admission medications    Medication Sig Start Date End Date Taking? Authorizing Provider   traZODone (DESYREL) 50 MG tablet Take 1 1/2 tabs at bedtime 1/14/22   Elaina Slater MD   SYMBICORT 80-4.5 MCG/ACT AERO Inhale 2 puffs into the lungs 2 times daily 12/13/21   Claus Bustos,    fluticasone-vilanterol (BREO ELLIPTA) 100-25 MCG/INH AEPB inhaler Inhale 1 puff into the lungs daily 11/9/21   Oneal Currie,    montelukast (SINGULAIR) 10 MG tablet Take 1 tablet by mouth nightly 11/9/21   Oneal Currie,    loratadine (CLARITIN) 10 MG tablet Take 1 tablet by mouth daily 11/9/21   Claus Bustos,    JUNEL FE 1/20 1-20 MG-MCG per tablet TAKE ONE TABLET BY MOUTH EVERY DAY 11/8/21   Janette Escalante DO   albuterol sulfate HFA (PROVENTIL HFA) 108 (90 Base) MCG/ACT inhaler Inhale 2 puffs into the lungs every 4 hours as needed for Wheezing 10/18/21 10/18/22  Janette Escalante DO   rizatriptan (MAXALT) 5 MG tablet Take one at migraine onset, may repeat once in 2-4 hrs. 9/17/21   Elaina Slater MD   ondansetron (ZOFRAN) 4 MG tablet Take 1 tablet by mouth every 12 hours as needed for Nausea or Vomiting Associated with  migraine 9/17/21   Elaina Slater MD   albuterol (ACCUNEB) 1.25 MG/3ML nebulizer solution INHALE 3 ML (1 VIAL) VIA NEBULIZER EVERY 4 TO 6 HOURS AS NEEDED 6/7/21   Historical Provider, MD   fluticasone (FLONASE) 50 MCG/ACT nasal spray 2 sprays by Each Nostril route daily 4/12/21   Janette Escalante DO       Allergies: Allergies   Allergen Reactions    Bee Venom Swelling    Seasonal      Sneezing, itchy watery eyes, congestion    Sumatriptan Other (See Comments)     Reports \"felt like head was on fire\"       Family history: DM: mother, GM, GF, father side, Heart disease: CHF - mother, GM afib. Social history: smoking: never ; Alcohol: quit alcohol 6 months ago - occasional prior, work: client services (sedentary work).     ROS -  Card - no CP, occ pressure/tightness on climbing a flight of stairs occasionally. GI - no N/V except migraine episodes, no D/C    PE -  Gen : BP (!) 132/56 (Site: Left Upper Arm, Position: Sitting, Cuff Size: Thigh)   Pulse 72   Temp 98.2 °F (36.8 °C) (Temporal)   Ht 5' 3.5\" (1.613 m)   Wt 287 lb 3.2 oz (130.3 kg)   LMP 01/28/2022 (Approximate)   BMI 50.08 kg/m²    WN, WD, NAD  Lung: Nml resp effort, CTA B/L  Heart:  RRR w/o MGR, no LE pitting edema b/l  Psych: Normal mood   Full affect  Neuro: Moves all ext well  ______________________    HISTORY & ASSESSMENT/PLAN -     Problem 1  - Fatigue   HPI   - Ongoing, was diagnosed to have CHRISTA, APAP seems to be helping  Assessment  - Uncontrolled   Plan   - continue APAP per Dr. Purvis Skiff. Weight reduction can help. Weight reduction per plan below    Problem 2  - Obesity   HPI   - See above Background for description    Weight  Date    287.2  2/11/22  DEN = 2082 Kar/d = 38163 Kar/wk  Wt effect of HR foods = ~1050 Kar/wk = 150 Kar/d= 7% DEN = 15 lb/year. Assessment  - Uncontrolled  Plan   - Talked about various options. Not interested in surgery. Does not want VLCD at this time. States that she is most comfortable with calorie counting and about 1500 calorie diet. Does not feel she will need appetite suppressant currently.  Planned as follows:  Patient Instructions     Rules:  · Count every calorie every day  · Limit sweets to one day per month  · Limit chips/crackers/pretzels/nuts/popcorn to 100 kar/day  · Eliminate all sugar sweetened beverages (including fruit juice)  · Limit restaurants (including fast food and food from a convenience store) to one time every two weeks while in town    Requirements:  · Make sure protein intake is at least 65 grams per day (do not count protein every day; instead spot check your intake every 2-3 weeks and make sure what you think you are getting is close to accurate; consider using a protein shake if needed; these are in the pharmacy section of the stores, not the grocery section; Premier, Pure sleeping. Return to see me in 4-6 weeks. Total time spent on encounter: 72 min. Kingsley Shea MD  Internal Medicine/Obesity Medicine  2/11/2022.

## 2022-02-14 ENCOUNTER — TELEPHONE (OUTPATIENT)
Dept: NEUROLOGY | Age: 27
End: 2022-02-14

## 2022-02-14 DIAGNOSIS — G43.019 INTRACTABLE MIGRAINE WITHOUT AURA AND WITHOUT STATUS MIGRAINOSUS: Primary | ICD-10-CM

## 2022-03-11 ENCOUNTER — TELEMEDICINE (OUTPATIENT)
Dept: NEUROLOGY | Age: 27
End: 2022-03-11
Payer: COMMERCIAL

## 2022-03-11 DIAGNOSIS — G43.009 MIGRAINE WITHOUT AURA AND WITHOUT STATUS MIGRAINOSUS, NOT INTRACTABLE: Primary | ICD-10-CM

## 2022-03-11 PROCEDURE — 99214 OFFICE O/P EST MOD 30 MIN: CPT | Performed by: PSYCHIATRY & NEUROLOGY

## 2022-03-11 PROCEDURE — G8427 DOCREV CUR MEDS BY ELIG CLIN: HCPCS | Performed by: PSYCHIATRY & NEUROLOGY

## 2022-03-11 RX ORDER — CYCLOBENZAPRINE HCL 10 MG
TABLET ORAL
COMMUNITY
Start: 2022-02-15 | End: 2022-03-11

## 2022-03-11 RX ORDER — DIVALPROEX SODIUM 250 MG/1
250 TABLET, EXTENDED RELEASE ORAL DAILY
Qty: 30 TABLET | Refills: 3 | Status: SHIPPED
Start: 2022-03-11 | End: 2022-05-18

## 2022-03-11 RX ORDER — TRAZODONE HYDROCHLORIDE 50 MG/1
TABLET ORAL
Qty: 30 TABLET | Refills: 5 | Status: SHIPPED
Start: 2022-03-11 | End: 2022-10-13 | Stop reason: SDUPTHER

## 2022-03-11 NOTE — PROGRESS NOTES
[facility] NEUROLOGY  [address] [facility] [address]  Dept: [phone]  Loc: [phone]    Telehealth follow up Note      [place]     Date of Visit:  3/11/2022  Primary Provider: Marsha Marques DO    CC:  Telehealth Neurology follow up, migraines without aura. Chief Complaint   Patient presents with    Migraine     Last one: 2 days ago, does not feel the medication is working well. Consent:  The patient and/or health care decision maker is aware that that he may receive a bill for this tele-health service [facility], depending on his insurance coverage, and has provided verbal consent to proceed: Yes  My patient is aware that they will need a follow-up visit (in-person or virtually) at the appropriate time indicated for continued medications. Further, my patient is aware that when this acute crisis has lifted, they will be expected to return for an in-person visit and all elements of standard local and hospital guidelines in order to continue this medication. HPI: 58-year-old woman with history of episodic migraines without a visual aura presenting for a follow-up visit. She reports a migraine exacerbation lasting 4 days. She tried Nurtec 75 mg at migraine onset and also for 4 days in a row but didn't think this helped. She then took a Maxalt 5 mg which did help to decrease the migraine. The headache resolved in several days with slight headache the next day. She tried increasing trazodone to 75 mg at bedtime for headache prophylaxis which may have helped. Migraines are reduced overall in frequency, having 3 migraines in total since her last appointment here. She couldn't take topiramate due to hx of kidney stones. I suggested a trial of low-dose divalproex  mg at bedtime for migraine prophylaxis which could be advanced to 500 mg at bedtime if clinically indicated.   She may also be a candidate for a CGRP-targeted therapy if this is not helpful in reducing migraine frequency and intensity. Refer to the prior telehealth Neurology note of 1/14/2022 for additional information if needed. ROS, family history/social history, medication/allergy list: Reviewed, updated    Headache pain is unchanged. Appropriate analgesia with current medications regimen: no.    Change in quality of symptoms:no. Medication side effects:not applicable . Recent diagnostic testing:none. Past Medical History: Reviewed    Past Surgical History: Reviewed     Home Medications: Reviewed    Current Outpatient Medications   Medication Sig Dispense Refill    Rimegepant Sulfate 75 MG TBDP Take 75 mg by mouth every other day 16 tablet 11    traZODone (DESYREL) 50 MG tablet Take 1 1/2 tabs at bedtime 45 tablet 3    SYMBICORT 80-4.5 MCG/ACT AERO Inhale 2 puffs into the lungs 2 times daily 10.2 g 3    montelukast (SINGULAIR) 10 MG tablet Take 1 tablet by mouth nightly 90 tablet 3    loratadine (CLARITIN) 10 MG tablet Take 1 tablet by mouth daily 90 tablet 3    JUNEL FE 1/20 1-20 MG-MCG per tablet TAKE ONE TABLET BY MOUTH EVERY DAY 28 tablet 5    albuterol sulfate HFA (PROVENTIL HFA) 108 (90 Base) MCG/ACT inhaler Inhale 2 puffs into the lungs every 4 hours as needed for Wheezing 1 each 5    rizatriptan (MAXALT) 5 MG tablet Take one at migraine onset, may repeat once in 2-4 hrs. 12 tablet 3    ondansetron (ZOFRAN) 4 MG tablet Take 1 tablet by mouth every 12 hours as needed for Nausea or Vomiting Associated with  migraine 30 tablet 2    albuterol (ACCUNEB) 1.25 MG/3ML nebulizer solution INHALE 3 ML (1 VIAL) VIA NEBULIZER EVERY 4 TO 6 HOURS AS NEEDED      fluticasone (FLONASE) 50 MCG/ACT nasal spray 2 sprays by Each Nostril route daily 3 Bottle 1    cyclobenzaprine (FLEXERIL) 10 MG tablet TAKE ONE TABLET BY MOUTH NIGHTLY AS NEEDED FOR MUSCLE SPASMS       No current facility-administered medications for this visit.        Allergies: Reviewed     Social History: Reviewed     REVIEW OF SYSTEMS:     Samira denies fever/chills, chest pain, shortness of breath, new bowel or bladder complaints. All other review of systems was negative. Reports decreased migraine frequency to 3 migraines since her last visit but still complains of a nagging milder headache during the week. VIRTUAL NEUROLOGIC EXAMINATION:      General:      Build:Overweight  A & O x3  Speech/language: Clear, fluent  CN II-XII: Remains grossly intact throughout. Pupils appear equal and reactive to light. EOMs are full without nystagmus. Visual fields remain full. Facial expression sensation normal symmetric. Hearing is grossly intact. The tongue is midline. Motor/Sensory Exam: No focal weakness. No focal sensory or numbness complaints. Coordination: No limb incoordination or tremors  Gait:normal    HEENT:    Head:normocephalic and atraumatic  Pupils:normal, regular, round and equal.  Sclera: icterus absent    Lungs:    Breathing:Normal expansion. Clear to auscultation. No rales, rhonchi, or wheezing. Musculoskeletal:    SLR:not done right, not done left, sitting     Extremities:    Tremors:None bilaterally upper and lower  Range of motion:Generally normal     Dermatology:    Skin:no skin lesions    Impression/Plan:  1. Chronic episodic migraines not associated with a visual aura, somewhat clinically improved  2. I suggested a trial of low-dose divalproex  mg at bedtime for migraine prophylaxis which could be advanced to 500 mg at bedtime if clinically indicated. She may also be a candidate for a CGRP-targeted therapy if this is not helpful in reducing migraine frequency and intensity. She will also reduce trazodone back to 50 mg at bedtime. She may continue to use Nurtec 75 mg strength tablet at the onset of a migraine as needed and/or Maxalt 5 mg strength tablet at migraine onset as a rescue medication. 3. Prefers to follow-up in 8 weeks with virtual visit. Patient advised regarding steps to help prevent the spread of COVID-19   SOURCE - [email or web address]-ryder.info/. html     1-Stay home except to get medical care  2-Clean your hands often for at least 20 seconds, avoid touching: Avoid touching your eyes, nose, and mouth with unwashed hands. 3-Seek medical attention: Seek prompt medical attention if your illness is worsening (e.g., difficulty breathing). Call you doctor first.  3-Wear a facemask if you are sick   4-Cover your coughs and sneezes           I affirm this is a Patient Initiated Episode with an established Patient who has not had a related appointment within my department in the past 7 days or scheduled within the next 24 hours. Note: This visit was completed virtually using DOXY. ME    Patient was evaluated through a synchronous (real-time) audio-video encounter. The patient is aware that this is a billable service which includes applicable co-pays. This virtual visit was conducted with the patient's consent. The visit was conducted pursuant to the emergency declaration under the [address] [place] [address], [address] [place] [address] authority and the [employer] and 1-4 [employer] General Act. Patient identification was verified and a caregiver was present when appropriate. Patient was located in a state where the provider was licensed to provide care, I.e., state of [place]. Total Time: 25 mins. Patient location: Home  Provider location: Office    Cc: Referring physician    Marcella Yeboah M.D.   Neurology & Clinical Neurophysiology

## 2022-03-14 DIAGNOSIS — J30.9 ALLERGIC RHINITIS, UNSPECIFIED SEASONALITY, UNSPECIFIED TRIGGER: ICD-10-CM

## 2022-03-14 DIAGNOSIS — J45.909 UNCOMPLICATED ASTHMA, UNSPECIFIED ASTHMA SEVERITY, UNSPECIFIED WHETHER PERSISTENT: ICD-10-CM

## 2022-03-14 RX ORDER — FLUTICASONE PROPIONATE 50 MCG
SPRAY, SUSPENSION (ML) NASAL
Qty: 48 G | Refills: 0 | Status: SHIPPED
Start: 2022-03-14 | End: 2022-04-04 | Stop reason: SDUPTHER

## 2022-03-14 NOTE — TELEPHONE ENCOUNTER
Last Appointment:  10/18/2021  Future Appointments   Date Time Provider Alyssa Gaby   4/8/2022  3:30 PM Marline Renteria MD Surg Weight [facility]   4/14/2022  9:00 AM [address] [facility] SLEEP [facility]   5/10/2022  3:40 PM Bebo Carrero MD [facility] Neuro [facility]

## 2022-03-20 DIAGNOSIS — M79.10 MYALGIA: ICD-10-CM

## 2022-03-21 RX ORDER — CYCLOBENZAPRINE HCL 10 MG
TABLET ORAL
Qty: 30 TABLET | Refills: 0 | Status: SHIPPED
Start: 2022-03-21 | End: 2022-04-04 | Stop reason: SDUPTHER

## 2022-04-04 DIAGNOSIS — J45.909 UNCOMPLICATED ASTHMA, UNSPECIFIED ASTHMA SEVERITY, UNSPECIFIED WHETHER PERSISTENT: ICD-10-CM

## 2022-04-04 DIAGNOSIS — J30.9 ALLERGIC RHINITIS, UNSPECIFIED SEASONALITY, UNSPECIFIED TRIGGER: ICD-10-CM

## 2022-04-04 DIAGNOSIS — M79.10 MYALGIA: ICD-10-CM

## 2022-04-04 RX ORDER — CYCLOBENZAPRINE HCL 10 MG
TABLET ORAL
Qty: 30 TABLET | Refills: 0 | Status: SHIPPED
Start: 2022-04-04 | End: 2022-06-17

## 2022-04-04 RX ORDER — FLUTICASONE PROPIONATE 50 MCG
SPRAY, SUSPENSION (ML) NASAL
Qty: 48 G | Refills: 3 | Status: SHIPPED
Start: 2022-04-04 | End: 2022-07-14 | Stop reason: SDUPTHER

## 2022-04-10 DIAGNOSIS — J45.30 MILD PERSISTENT ASTHMA, UNSPECIFIED WHETHER COMPLICATED: ICD-10-CM

## 2022-04-11 RX ORDER — DILTIAZEM HYDROCHLORIDE 60 MG/1
TABLET, FILM COATED ORAL
Qty: 10.2 G | Refills: 0 | Status: SHIPPED
Start: 2022-04-11 | End: 2022-07-14 | Stop reason: SDUPTHER

## 2022-05-18 ENCOUNTER — OFFICE VISIT (OUTPATIENT)
Dept: FAMILY MEDICINE CLINIC | Age: 27
End: 2022-05-18
Payer: COMMERCIAL

## 2022-05-18 VITALS
BODY MASS INDEX: 48.32 KG/M2 | HEART RATE: 87 BPM | WEIGHT: 283 LBS | TEMPERATURE: 97.2 F | SYSTOLIC BLOOD PRESSURE: 120 MMHG | DIASTOLIC BLOOD PRESSURE: 80 MMHG | OXYGEN SATURATION: 98 % | RESPIRATION RATE: 16 BRPM | HEIGHT: 64 IN

## 2022-05-18 DIAGNOSIS — J45.30 MILD PERSISTENT ASTHMA, UNSPECIFIED WHETHER COMPLICATED: ICD-10-CM

## 2022-05-18 DIAGNOSIS — F41.9 ANXIETY: ICD-10-CM

## 2022-05-18 DIAGNOSIS — R07.9 CHEST PAIN, UNSPECIFIED TYPE: Primary | ICD-10-CM

## 2022-05-18 PROCEDURE — 1036F TOBACCO NON-USER: CPT | Performed by: STUDENT IN AN ORGANIZED HEALTH CARE EDUCATION/TRAINING PROGRAM

## 2022-05-18 PROCEDURE — G8427 DOCREV CUR MEDS BY ELIG CLIN: HCPCS | Performed by: STUDENT IN AN ORGANIZED HEALTH CARE EDUCATION/TRAINING PROGRAM

## 2022-05-18 PROCEDURE — 93000 ELECTROCARDIOGRAM COMPLETE: CPT | Performed by: STUDENT IN AN ORGANIZED HEALTH CARE EDUCATION/TRAINING PROGRAM

## 2022-05-18 PROCEDURE — 99213 OFFICE O/P EST LOW 20 MIN: CPT | Performed by: STUDENT IN AN ORGANIZED HEALTH CARE EDUCATION/TRAINING PROGRAM

## 2022-05-18 PROCEDURE — G8417 CALC BMI ABV UP PARAM F/U: HCPCS | Performed by: STUDENT IN AN ORGANIZED HEALTH CARE EDUCATION/TRAINING PROGRAM

## 2022-05-18 RX ORDER — HYDROXYZINE 50 MG/1
50 TABLET, FILM COATED ORAL NIGHTLY
Qty: 30 TABLET | Refills: 3 | Status: SHIPPED
Start: 2022-05-18 | End: 2022-06-17

## 2022-05-18 ASSESSMENT — ENCOUNTER SYMPTOMS
DIARRHEA: 0
SINUS PAIN: 0
NAUSEA: 0
SINUS PRESSURE: 0
SORE THROAT: 0
EYE REDNESS: 0
VOMITING: 0
CONSTIPATION: 0
RHINORRHEA: 0
EYE PAIN: 0
BACK PAIN: 0
ABDOMINAL PAIN: 0
COUGH: 0
SHORTNESS OF BREATH: 0

## 2022-05-18 ASSESSMENT — PATIENT HEALTH QUESTIONNAIRE - PHQ9
SUM OF ALL RESPONSES TO PHQ QUESTIONS 1-9: 1
SUM OF ALL RESPONSES TO PHQ9 QUESTIONS 1 & 2: 1
SUM OF ALL RESPONSES TO PHQ QUESTIONS 1-9: 1
1. LITTLE INTEREST OR PLEASURE IN DOING THINGS: 0
SUM OF ALL RESPONSES TO PHQ QUESTIONS 1-9: 1
SUM OF ALL RESPONSES TO PHQ QUESTIONS 1-9: 1
2. FEELING DOWN, DEPRESSED OR HOPELESS: 1

## 2022-05-18 NOTE — PROGRESS NOTES
5/18/2022    [facility]  Chief Complaint   Patient presents with    Follow-up    Chest Pain     slight chest pain, pt thinks it is due to anxiety.  Anxiety     and possible ADHD    Headache     pt is still getting migrains and would like second opinion        Severino Valente is a 31 y.o. female who  has a past medical history of Asthma, Chronic fatigue, Class 3 severe obesity due to excess calories with serious comorbidity and body mass index (BMI) of 50.0 to 59.9 in [facility]), Elevated liver transaminase level, Kidney stone, Migraine without aura, and Migraines. Anxiety and/or depression:  Patient is here with complaints of anxiety and/or depression. This is a/an chronic problem. This has been going on for 3 years. Treatment in the past includes used Cymbalta and does counseling . Anxiety symptoms include breathing issues, self isolation, overthinking. Depressive symptoms include low mood, low motivation, sadness, and overthinking. Patient does not have suicidal or homicidal ideation. Patient is  having issues falling or staying asleep. Patient is not having associated panic attacks. The above is  interfering with quality of life. Patient has seen a Counselor before. Patient does not use alcohol and/or drugs. [facility] and they told her they think she may have ADHD. Patient states that the Topamax really worked for her headaches but it gave her kidney stones. She has been taking the nurtec as needed for the headaches. She will discuss this with her neurologist. I did discuss aimovig and ajovy with the patient as well. Patient's past medical, surgical, social and/or family history reviewed, updated in chart, and are non-contributory (unless otherwise stated). Medications and allergies also reviewed and updated in chart. Review of Systems   Constitutional: Negative for chills, fatigue and fever.    HENT: Negative for congestion, ear pain, postnasal drip, rhinorrhea, sinus pressure, sinus pain and sore throat. Eyes: Negative for pain and redness. Respiratory: Negative for cough and shortness of breath. Cardiovascular: Positive for chest pain (2/2 anxiety? ). Gastrointestinal: Negative for abdominal pain, constipation, diarrhea, nausea and vomiting. Genitourinary: Negative for difficulty urinating and dysuria. Musculoskeletal: Negative for back pain, myalgias and neck pain. Skin: Negative for rash. Neurological: Positive for headaches. Negative for dizziness, light-headedness and numbness. Psychiatric/Behavioral: Positive for decreased concentration, dysphoric mood and sleep disturbance. The patient is nervous/anxious. Prior to Visit Medications    Medication Sig Taking? Authorizing Provider   hydrOXYzine (ATARAX) 50 MG tablet Take 1 tablet by mouth nightly Yes Janette Escalante DO   SYMBICORT 80-4.5 MCG/ACT AERO INHALE TWO PUFFS BY MOUTH TWO TIMES A DAY Yes Ramiro Bustos DO   cyclobenzaprine (FLEXERIL) 10 MG tablet TAKE ONE TABLET BY MOUTH NIGHTLY AS NEEDED FOR MUSCLE SPASMS Yes Janette Escalante DO   fluticasone (FLONASE) 50 MCG/ACT nasal spray INSTILL TWO SPRAYS INTO EACH NOSTRIL DAILY Yes Janette Escalante DO   traZODone (DESYREL) 50 MG tablet Take one at bedtime Yes Lady Isaias MD   Rimegepant Sulfate 75 MG TBDP Take 75 mg by mouth every other day Yes Lady Isaias MD   montelukast (SINGULAIR) 10 MG tablet Take 1 tablet by mouth nightly Yes Barrington Bustos DO   loratadine (CLARITIN) 10 MG tablet Take 1 tablet by mouth daily Yes Ramiro Bustos DO   JUNEL FE 1/20 1-20 MG-MCG per tablet TAKE ONE TABLET BY MOUTH EVERY DAY Yes Janette Escalante DO   albuterol sulfate HFA (PROVENTIL HFA) 108 (90 Base) MCG/ACT inhaler Inhale 2 puffs into the lungs every 4 hours as needed for Wheezing Yes Janette Escalante DO   rizatriptan (MAXALT) 5 MG tablet Take one at migraine onset, may repeat once in 2-4 hrs.  Yes Maksim Arreola Delmi Bennett MD   ondansetron (ZOFRAN) 4 MG tablet Take 1 tablet by mouth every 12 hours as needed for Nausea or Vomiting Associated with  migraine Yes Elmer Montes De Oca MD   albuterol (ACCUNEB) 1.25 MG/3ML nebulizer solution INHALE 3 ML (1 VIAL) VIA NEBULIZER EVERY 4 TO 6 HOURS AS NEEDED Yes Historical Provider, MD        Allergies   Allergen Reactions    Bee Venom Swelling    Seasonal      Sneezing, itchy watery eyes, congestion    Sumatriptan Other (See Comments)     Reports \"felt like head was on fire\"       Past Medical History:   Diagnosis Date    Asthma     Chronic fatigue     Class 3 severe obesity due to excess calories with serious comorbidity and body mass index (BMI) of 50.0 to 59.9 in [facility])     Elevated liver transaminase level 09/17/2021    Kidney stone     Migraine without aura 09/17/2021    Migraines        History reviewed. No pertinent surgical history. Social History     Socioeconomic History    Marital status: Single     Spouse name: Not on file    Number of children: Not on file    Years of education: Not on file    Highest education level: Not on file   Occupational History    Not on file   Tobacco Use    Smoking status: Never Smoker    Smokeless tobacco: Never Used   Substance and Sexual Activity    Alcohol use: Yes     Comment: occ    Drug use: Never    Sexual activity: Not on file   Other Topics Concern    Not on file   Social History Narrative    Not on file     Social Determinants of Health     Financial Resource Strain: Low Risk     Difficulty of Paying Living Expenses: Not hard at all   Food Insecurity: No Food Insecurity    Worried About Running Out of Food in the Last Year: Never true    [address] in the Last Year: Never true   Transportation Needs:     Lack of Transportation (Medical): Not on file    Lack of Transportation (Non-Medical):  Not on file   Physical Activity:     Days of Exercise per Week: Not on file    Minutes of Exercise per Session: Not on file   Stress:     Feeling of Stress : Not on file   Social Connections:     Frequency of Communication with Friends and Family: Not on file    Frequency of Social Gatherings with Friends and Family: Not on file    Attends Anglican Services: Not on file    Active Member of Clubs or Organizations: Not on file    Attends Club or Organization Meetings: Not on file    Marital Status: Not on file   Intimate Partner Violence:     Fear of Current or Ex-Partner: Not on file    Emotionally Abused: Not on file    Physically Abused: Not on file    Sexually Abused: Not on file   Housing Stability:     Unable to Pay for Housing in the Last Year: Not on file    Number of [place] in the Last Year: Not on file    Unstable Housing in the Last Year: Not on file        Family History   Problem Relation Age of Onset    Migraines Mother            Vitals:    05/18/22 1501   BP: 120/80   Pulse: 87   Resp: 16   Temp: 97.2 °F (36.2 °C)   TempSrc: Temporal   SpO2: 98%   Weight: 283 lb (128.4 kg)   Height: 5' 3.5\" (1.613 m)     Estimated body mass index is 49.34 kg/m² as calculated from the following:    Height as of this encounter: 5' 3.5\" (1.613 m). Weight as of this encounter: 283 lb (128.4 kg).     Most Recent Labs  CBC  Lab Results   Component Value Date    WBC 7.2 07/14/2021    WBC 9.7 04/30/2021    RBC 5.26 07/14/2021    RBC 4.91 04/30/2021    HGB 14.4 07/14/2021    HGB 13.3 04/30/2021    HCT 44.6 07/14/2021    HCT 41.2 04/30/2021    MCV 84.8 07/14/2021    MCV 83.9 04/30/2021     07/14/2021     04/30/2021      CMP  Lab Results   Component Value Date     07/14/2021     04/30/2021    K 4.3 07/14/2021    K 3.9 04/30/2021     07/14/2021     04/30/2021    CO2 24 07/14/2021    CO2 25 04/30/2021    ANIONGAP 14 07/14/2021    ANIONGAP 10 04/30/2021    GLUCOSE 96 07/14/2021    GLUCOSE 153 04/30/2021    BUN 9 07/14/2021    BUN 11 04/30/2021    CREATININE 0.8 07/14/2021 CREATININE 0.8 04/30/2021    LABGLOM >60 07/14/2021    LABGLOM >60 04/30/2021    GFRAA >60 07/14/2021    GFRAA >60 04/30/2021    CALCIUM 9.9 07/14/2021    CALCIUM 9.1 04/30/2021    PROT 7.9 07/14/2021    PROT 7.1 04/30/2021    LABALBU 4.5 07/14/2021    LABALBU 4.4 04/30/2021    BILITOT 0.3 07/14/2021    BILITOT <0.2 04/30/2021    ALKPHOS 98 07/14/2021    ALKPHOS 91 04/30/2021    AST 40 07/14/2021    AST 28 04/30/2021    ALT 54 07/14/2021    ALT 45 04/30/2021     UA  Lab Results   Component Value Date    COLORU Yellow 07/14/2021    CLARITYU Clear 07/14/2021    GLUCOSEU Negative 07/14/2021    BILIRUBINUR Negative 07/14/2021    KETUA Negative 07/14/2021    SPECGRAV 1.025 07/14/2021    BLOODU Negative 07/14/2021    PHUR 7.5 07/14/2021    PROTEINU Negative 07/14/2021    UROBILINOGEN 0.2 07/14/2021    NITRU Negative 07/14/2021    LEUKOCYTESUR SMALL 07/14/2021       Physical Exam  Vitals and nursing note reviewed. Constitutional:       Appearance: Normal appearance. She is obese. HENT:      Head: Normocephalic and atraumatic. Right Ear: External ear normal.      Left Ear: External ear normal.   Eyes:      Extraocular Movements: Extraocular movements intact. Conjunctiva/sclera: Conjunctivae normal.      Pupils: Pupils are equal, round, and reactive to light. Cardiovascular:      Rate and Rhythm: Normal rate and regular rhythm. Pulses: Normal pulses. Heart sounds: Normal heart sounds. Pulmonary:      Effort: Pulmonary effort is normal.      Breath sounds: Normal breath sounds. Abdominal:      General: Bowel sounds are normal.      Palpations: Abdomen is soft. Musculoskeletal:         General: Normal range of motion. Cervical back: Normal range of motion and neck supple. Skin:     General: Skin is warm and dry. Capillary Refill: Capillary refill takes less than 2 seconds. Neurological:      General: No focal deficit present.       Mental Status: She is alert and oriented to person, place, and time. Psychiatric:         Mood and Affect: Mood normal.         Behavior: Behavior normal.         Thought Content: Thought content normal.         ASSESSMENT/PLAN:  Samira was seen today for follow-up, chest pain, anxiety and headache. Diagnoses and all orders for this visit:    Chest pain, unspecified type  -     EKG 12 Lead    Mild persistent asthma, unspecified whether complicated    Anxiety  -     External Referral To Psychiatry  -     hydrOXYzine (ATARAX) 50 MG tablet; Take 1 tablet by mouth nightly         As above. Call or go to the nearest ED immediately if symptoms worsen or persist.  Educational materials and/or home exercises printed for patient's review and were included in patient instructions on his/her After Visit Summary and given to patient at the end of visit. Counseled regarding above diagnosis, including possible risks and complications,  especially if left uncontrolled. Counseled regarding the possible side effects, risks, benefits and alternatives to treatment; patient and/or guardian verbalizes understanding, agrees, feels comfortable with and wishes to proceed with above treatment plan. Advised patient to call with any new medication issues, and read all Rx info from pharmacy to assure aware of all possible risks and side effects of medication before taking. Reviewed age and gender appropriate health screening exams and vaccinations. Advised patient regarding importance of keeping up with recommended health maintenance and to schedule as soon as possible if overdue, as this is important in assessing for undiagnosed pathology, especially cancer, as well as protecting against potentially harmful/life threatening disease. Patient and/or guardian verbalizes understanding and agrees with above counseling, assessment and plan. All questions answered.      Return in about 3 months (around 8/18/2022) for anxiety virtual.    An  electronic signature was used to authenticate this note. --Deb Urbina DO on 5/18/2022 at 3:54 PM    This document was prepared at least partially through the use of voice recognition software. Although effort is taken to assure the accuracy of this document, it is possible that grammatical, syntax,  or spelling errors may occur.

## 2022-06-10 ENCOUNTER — HOSPITAL ENCOUNTER (OUTPATIENT)
Age: 27
Discharge: HOME | End: 2022-06-10
Payer: COMMERCIAL

## 2022-06-10 LAB
ANION GAP SERPL CALCULATED.3IONS-SCNC: 10 MMOL/L (ref 7–16)
BUN BLDV-MCNC: 14 MG/DL (ref 6–20)
CALCIUM SERPL-MCNC: 9.6 MG/DL (ref 8.6–10.2)
CHLORIDE BLD-SCNC: 103 MMOL/L (ref 98–107)
CO2: 24 MMOL/L (ref 22–29)
CREAT SERPL-MCNC: 0.9 MG/DL (ref 0.5–1)
GFR AFRICAN AMERICAN: >60
GFR NON-AFRICAN AMERICAN: >60 ML/MIN/1.73
GLUCOSE BLD-MCNC: 113 MG/DL (ref 74–99)
PARATHYROID HORMONE INTACT: 46 PG/ML (ref 15–65)
POTASSIUM SERPL-SCNC: 4.3 MMOL/L (ref 3.5–5)
SODIUM BLD-SCNC: 137 MMOL/L (ref 132–146)
URIC ACID, SERUM: 5.8 MG/DL (ref 2.4–5.7)

## 2022-06-10 PROCEDURE — 36415 COLL VENOUS BLD VENIPUNCTURE: CPT

## 2022-06-10 PROCEDURE — 83970 ASSAY OF PARATHORMONE: CPT

## 2022-06-10 PROCEDURE — 80048 BASIC METABOLIC PNL TOTAL CA: CPT

## 2022-06-10 PROCEDURE — 84550 ASSAY OF BLOOD/URIC ACID: CPT

## 2022-06-14 ENCOUNTER — PATIENT MESSAGE (OUTPATIENT)
Dept: SLEEP CENTER | Age: 27
End: 2022-06-14

## 2022-06-17 ENCOUNTER — TELEMEDICINE (OUTPATIENT)
Dept: FAMILY MEDICINE CLINIC | Age: 27
End: 2022-06-17
Payer: COMMERCIAL

## 2022-06-17 DIAGNOSIS — F41.9 ANXIETY: Primary | ICD-10-CM

## 2022-06-17 PROCEDURE — G8427 DOCREV CUR MEDS BY ELIG CLIN: HCPCS | Performed by: STUDENT IN AN ORGANIZED HEALTH CARE EDUCATION/TRAINING PROGRAM

## 2022-06-17 PROCEDURE — G8417 CALC BMI ABV UP PARAM F/U: HCPCS | Performed by: STUDENT IN AN ORGANIZED HEALTH CARE EDUCATION/TRAINING PROGRAM

## 2022-06-17 PROCEDURE — 99214 OFFICE O/P EST MOD 30 MIN: CPT | Performed by: STUDENT IN AN ORGANIZED HEALTH CARE EDUCATION/TRAINING PROGRAM

## 2022-06-17 PROCEDURE — 1036F TOBACCO NON-USER: CPT | Performed by: STUDENT IN AN ORGANIZED HEALTH CARE EDUCATION/TRAINING PROGRAM

## 2022-06-17 RX ORDER — TAMSULOSIN HYDROCHLORIDE 0.4 MG/1
CAPSULE ORAL
COMMUNITY
Start: 2022-06-10 | End: 2022-10-13

## 2022-06-17 RX ORDER — BUSPIRONE HYDROCHLORIDE 10 MG/1
10 TABLET ORAL 2 TIMES DAILY
Qty: 60 TABLET | Refills: 3 | Status: SHIPPED
Start: 2022-06-17 | End: 2022-10-17

## 2022-06-17 SDOH — ECONOMIC STABILITY: FOOD INSECURITY: WITHIN THE PAST 12 MONTHS, THE FOOD YOU BOUGHT JUST DIDN'T LAST AND YOU DIDN'T HAVE MONEY TO GET MORE.: NEVER TRUE

## 2022-06-17 SDOH — ECONOMIC STABILITY: FOOD INSECURITY: WITHIN THE PAST 12 MONTHS, YOU WORRIED THAT YOUR FOOD WOULD RUN OUT BEFORE YOU GOT MONEY TO BUY MORE.: NEVER TRUE

## 2022-06-17 ASSESSMENT — ENCOUNTER SYMPTOMS
SHORTNESS OF BREATH: 1
COUGH: 0
NAUSEA: 0
ABDOMINAL PAIN: 0
RHINORRHEA: 0
SINUS PRESSURE: 0
CONSTIPATION: 0
EYE PAIN: 0
SINUS PAIN: 0
VOMITING: 0
BACK PAIN: 0
DIARRHEA: 0
SORE THROAT: 0
EYE REDNESS: 0

## 2022-06-17 ASSESSMENT — SOCIAL DETERMINANTS OF HEALTH (SDOH): HOW HARD IS IT FOR YOU TO PAY FOR THE VERY BASICS LIKE FOOD, HOUSING, MEDICAL CARE, AND HEATING?: NOT VERY HARD

## 2022-06-17 NOTE — PROGRESS NOTES
Angelika Black (:  1995) is a Established patient, here for evaluation of the following:    Assessment & Plan   Below is the assessment and plan developed based on review of pertinent history, physical exam, labs, studies, and medications. 1. Anxiety  -     busPIRone (BUSPAR) 10 MG tablet; Take 1 tablet by mouth 2 times daily, Disp-60 tablet, R-3Normal    Patient is too groggy in the mornings with the hydroxyzine. We will stop this and start buspar at this time   Patient to continue with the trazodone at night to help her sleep   Recheck in about 2 months to see how she is feeling  Patient to call us with any side effects     Return in about 8 weeks (around 2022) for anxiety. Rich Duff is a 31 y.o. female who presents for follow up of anxiety disorder. Current symptoms: chest pain, difficulty concentrating, fatigue, irritable, racing thoughts, shortness of breath. She denies current suicidal and homicidal ideation. She complains of the following side effects from the treatment: drowsiness. Review of Systems   Constitutional: Negative for chills, fatigue and fever. HENT: Negative for congestion, ear pain, postnasal drip, rhinorrhea, sinus pressure, sinus pain and sore throat. Eyes: Negative for pain and redness. Respiratory: Positive for shortness of breath. Negative for cough. Cardiovascular: Positive for chest pain. Gastrointestinal: Negative for abdominal pain, constipation, diarrhea, nausea and vomiting. Genitourinary: Negative for difficulty urinating and dysuria. Musculoskeletal: Negative for back pain, myalgias and neck pain. Skin: Negative for rash. Neurological: Positive for headaches. Negative for dizziness, light-headedness and numbness. Psychiatric/Behavioral: Positive for decreased concentration, dysphoric mood and sleep disturbance. Negative for suicidal ideas. The patient is nervous/anxious.            Objective   Patient-Reported Vitals  No data recorded     [INSTRUCTIONS:  \"[x]\" Indicates a positive item  \"[]\" Indicates a negative item  -- DELETE ALL ITEMS NOT EXAMINED]    Constitutional: [x] Appears well-developed and well-nourished [x] No apparent distress      [] Abnormal -     Mental status: [x] Alert and awake  [x] Oriented to person/place/time [x] Able to follow commands    [] Abnormal -     Eyes:   EOM    [x]  Normal    [] Abnormal -   Sclera  [x]  Normal    [] Abnormal -          Discharge [x]  None visible   [] Abnormal -     HENT: [x] Normocephalic, atraumatic  [] Abnormal -   [x] Mouth/Throat: Mucous membranes are moist    External Ears [x] Normal  [] Abnormal -    Neck: [x] No visualized mass [] Abnormal -     Pulmonary/Chest: [x] Respiratory effort normal   [x] No visualized signs of difficulty breathing or respiratory distress        [] Abnormal -      Musculoskeletal:   [x] Normal gait with no signs of ataxia         [x] Normal range of motion of neck        [] Abnormal -     Neurological:        [x] No Facial Asymmetry (Cranial nerve 7 motor function) (limited exam due to video visit)          [x] No gaze palsy        [] Abnormal -          Skin:        [x] No significant exanthematous lesions or discoloration noted on facial skin         [] Abnormal -            Psychiatric:       [x] Normal Affect [] Abnormal -        [x] No Hallucinations                 Samira Neri Person, was evaluated through a synchronous (real-time) audio-video encounter. The patient (or guardian if applicable) is aware that this is a billable service, which includes applicable co-pays. This Virtual Visit was conducted with patient's (and/or legal guardian's) consent. The visit was conducted pursuant to the emergency declaration under the [facility], [address] [place] [address] authority and the [employer] General Act.   Patient identification was verified, and a caregiver was present when appropriate. The patient was located in a car, as a passenger   Provider was located at [facility] ([place] [address]): [facility],  [address] [facility] [place]         --Kari Guerrero, DO

## 2022-07-04 ENCOUNTER — TELEPHONE (OUTPATIENT)
Dept: SLEEP CENTER | Age: 27
End: 2022-07-04

## 2022-07-04 DIAGNOSIS — G47.33 OSA (OBSTRUCTIVE SLEEP APNEA): Primary | ICD-10-CM

## 2022-07-14 DIAGNOSIS — J30.9 ALLERGIC RHINITIS, UNSPECIFIED SEASONALITY, UNSPECIFIED TRIGGER: ICD-10-CM

## 2022-07-14 DIAGNOSIS — J45.30 MILD PERSISTENT ASTHMA, UNSPECIFIED WHETHER COMPLICATED: ICD-10-CM

## 2022-07-14 DIAGNOSIS — E28.2 PCOS (POLYCYSTIC OVARIAN SYNDROME): ICD-10-CM

## 2022-07-14 DIAGNOSIS — J45.909 UNCOMPLICATED ASTHMA, UNSPECIFIED ASTHMA SEVERITY, UNSPECIFIED WHETHER PERSISTENT: ICD-10-CM

## 2022-07-14 RX ORDER — NORETHINDRONE ACETATE AND ETHINYL ESTRADIOL 1MG-20(21)
KIT ORAL
Qty: 28 TABLET | Refills: 5 | Status: SHIPPED | OUTPATIENT
Start: 2022-07-14

## 2022-07-15 RX ORDER — FLUTICASONE PROPIONATE 50 MCG
SPRAY, SUSPENSION (ML) NASAL
Qty: 48 G | Refills: 3 | Status: SHIPPED | OUTPATIENT
Start: 2022-07-15

## 2022-07-15 RX ORDER — NORETHINDRONE ACETATE AND ETHINYL ESTRADIOL 1MG-20(21)
KIT ORAL
Qty: 28 TABLET | Refills: 5 | OUTPATIENT
Start: 2022-07-15

## 2022-07-15 NOTE — TELEPHONE ENCOUNTER
Last Appointment:  6/17/2022  Future Appointments   Date Time Provider Alyssa Griffin   8/19/2022  8:45 AM DO Fitz [facility]      Refill on birth control was sent yesterday.

## 2022-07-18 RX ORDER — DILTIAZEM HYDROCHLORIDE 60 MG/1
2 TABLET, FILM COATED ORAL 2 TIMES DAILY
Qty: 10.2 G | Refills: 11 | Status: SHIPPED | OUTPATIENT
Start: 2022-07-18

## 2022-07-22 ENCOUNTER — TELEPHONE (OUTPATIENT)
Dept: FAMILY MEDICINE CLINIC | Age: 27
End: 2022-07-22

## 2022-07-22 NOTE — TELEPHONE ENCOUNTER
Please send referral over to Sebastián Hogan for Gina as this is Melani's partner and they are both positive for Covid.

## 2022-08-19 ENCOUNTER — TELEMEDICINE (OUTPATIENT)
Dept: FAMILY MEDICINE CLINIC | Age: 27
End: 2022-08-19
Payer: COMMERCIAL

## 2022-08-19 DIAGNOSIS — M25.532 LEFT WRIST PAIN: Primary | ICD-10-CM

## 2022-08-19 DIAGNOSIS — J45.909 UNCOMPLICATED ASTHMA, UNSPECIFIED ASTHMA SEVERITY, UNSPECIFIED WHETHER PERSISTENT: ICD-10-CM

## 2022-08-19 PROCEDURE — G8417 CALC BMI ABV UP PARAM F/U: HCPCS | Performed by: STUDENT IN AN ORGANIZED HEALTH CARE EDUCATION/TRAINING PROGRAM

## 2022-08-19 PROCEDURE — G8427 DOCREV CUR MEDS BY ELIG CLIN: HCPCS | Performed by: STUDENT IN AN ORGANIZED HEALTH CARE EDUCATION/TRAINING PROGRAM

## 2022-08-19 PROCEDURE — 99213 OFFICE O/P EST LOW 20 MIN: CPT | Performed by: STUDENT IN AN ORGANIZED HEALTH CARE EDUCATION/TRAINING PROGRAM

## 2022-08-19 PROCEDURE — 1036F TOBACCO NON-USER: CPT | Performed by: STUDENT IN AN ORGANIZED HEALTH CARE EDUCATION/TRAINING PROGRAM

## 2022-08-19 RX ORDER — ALBUTEROL SULFATE 1.25 MG/3ML
SOLUTION RESPIRATORY (INHALATION)
Qty: 360 ML | Refills: 3 | Status: SHIPPED | OUTPATIENT
Start: 2022-08-19

## 2022-08-19 RX ORDER — ALBUTEROL SULFATE 90 UG/1
2 AEROSOL, METERED RESPIRATORY (INHALATION) EVERY 4 HOURS PRN
Qty: 1 EACH | Refills: 5 | Status: SHIPPED | OUTPATIENT
Start: 2022-08-19 | End: 2023-08-19

## 2022-08-19 RX ORDER — BUSPIRONE HYDROCHLORIDE 10 MG/1
TABLET ORAL
COMMUNITY
Start: 2022-08-11 | End: 2022-10-13

## 2022-08-19 RX ORDER — DEXTROAMPHETAMINE SACCHARATE, AMPHETAMINE ASPARTATE, DEXTROAMPHETAMINE SULFATE AND AMPHETAMINE SULFATE 2.5; 2.5; 2.5; 2.5 MG/1; MG/1; MG/1; MG/1
TABLET ORAL
COMMUNITY
Start: 2022-08-16

## 2022-08-19 RX ORDER — PAROXETINE HYDROCHLORIDE 40 MG/1
TABLET, FILM COATED ORAL
COMMUNITY
Start: 2022-08-16

## 2022-08-19 ASSESSMENT — ENCOUNTER SYMPTOMS
SHORTNESS OF BREATH: 0
SINUS PRESSURE: 0
DIARRHEA: 0
NAUSEA: 0
EYE REDNESS: 0
CONSTIPATION: 0
SORE THROAT: 0
COUGH: 0
RHINORRHEA: 0
VOMITING: 0
ABDOMINAL PAIN: 0
SINUS PAIN: 0
EYE PAIN: 0

## 2022-08-19 ASSESSMENT — PATIENT HEALTH QUESTIONNAIRE - PHQ9
2. FEELING DOWN, DEPRESSED OR HOPELESS: 1
SUM OF ALL RESPONSES TO PHQ QUESTIONS 1-9: 2
SUM OF ALL RESPONSES TO PHQ QUESTIONS 1-9: 2
SUM OF ALL RESPONSES TO PHQ9 QUESTIONS 1 & 2: 2
SUM OF ALL RESPONSES TO PHQ QUESTIONS 1-9: 2
SUM OF ALL RESPONSES TO PHQ QUESTIONS 1-9: 2
1. LITTLE INTEREST OR PLEASURE IN DOING THINGS: 1

## 2022-08-19 NOTE — PROGRESS NOTES
Manjinder De León (:  1995) is a Established patient, here for evaluation of the following:    Assessment & Plan   Below is the assessment and plan developed based on review of pertinent history, physical exam, labs, studies, and medications. 1. Left wrist pain  -     XR WRIST LEFT (MIN 3 VIEWS); Future  -     Misc. Devices (WRIST BRACE) MISC; 1 each by Does not apply route at bedtime, Disp-1 each, R-0Print  2. Uncomplicated asthma, unspecified asthma severity, unspecified whether persistent  -     albuterol sulfate HFA (PROVENTIL HFA) 108 (90 Base) MCG/ACT inhaler; Inhale 2 puffs into the lungs every 4 hours as needed for Wheezing, Disp-1 each, R-5Normal  -     albuterol (ACCUNEB) 1.25 MG/3ML nebulizer solution; INHALE 3 ML (1 VIAL) VIA NEBULIZER EVERY 4 TO 6 HOURS AS NEEDED, Disp-360 mL, R-3Normal    Return in about 8 weeks (around 10/14/2022). Subjective   Wrist Pain  Patient complains of left wrist pain. The pain is severe, worsens with movement, and some relief by rest.  There is not associated numbness, tingling in the left hand. Pain has been present for several days. There is a history of wrist pain in the past. She is unsure if this is due to carpal tunnel or not. She has had issues in the past and has used a brace. She was told before there was a bone that was too small in her wrist not letting her have adequate support. Len Boeck is a 28 y.o. female who presents for follow up of anxiety disorder. Current symptoms: difficulty concentrating, racing thoughts. She denies current suicidal and homicidal ideation. She complains of the following side effects from the treatment: none. She is currently seeing a psychiatrist but does not really like her. She does not know how much she is actually listening to her. She will let us know who she wants us to refer her to. Review of Systems   Constitutional:  Negative for chills, fatigue and fever.    HENT:  Negative for congestion, facility-administered medications on file prior to visit. Objective   Patient-Reported Vitals  No data recorded       [INSTRUCTIONS:  \"[x]\" Indicates a positive item  \"[]\" Indicates a negative item  -- DELETE ALL ITEMS NOT EXAMINED]    Constitutional: [x] Appears well-developed and well-nourished [x] No apparent distress      [] Abnormal -     Mental status: [x] Alert and awake  [x] Oriented to person/place/time [x] Able to follow commands    [] Abnormal -     Eyes:   EOM    [x]  Normal    [] Abnormal -   Sclera  [x]  Normal    [] Abnormal -          Discharge [x]  None visible   [] Abnormal -     HENT: [x] Normocephalic, atraumatic  [] Abnormal -   [x] Mouth/Throat: Mucous membranes are moist    External Ears [x] Normal  [] Abnormal -    Neck: [x] No visualized mass [] Abnormal -     Pulmonary/Chest: [x] Respiratory effort normal   [x] No visualized signs of difficulty breathing or respiratory distress        [] Abnormal -      Musculoskeletal:   [x] Normal gait with no signs of ataxia         [x] Normal range of motion of neck        [] Abnormal -     Neurological:        [x] No Facial Asymmetry (Cranial nerve 7 motor function) (limited exam due to video visit)          [x] No gaze palsy        [] Abnormal -          Skin:        [x] No significant exanthematous lesions or discoloration noted on facial skin         [] Abnormal -            Psychiatric:       [x] Normal Affect [] Abnormal -        [x] No Hallucinations    Other pertinent observable physical exam findings:-             Jhony Henry, was evaluated through a synchronous (real-time) audio-video encounter. The patient (or guardian if applicable) is aware that this is a billable service, which includes applicable co-pays. This Virtual Visit was conducted with patient's (and/or legal guardian's) consent.  The visit was conducted pursuant to the emergency declaration under the [address], [facility] waiver authority and the [employer] General Act. Patient identification was verified, and a caregiver was present when appropriate. The patient was located at Home: [address] [place] [address]  [phone]. Provider was located at [facility] ([place] [address]): [facility],  [address] [facility]..         --Ochoa Duff DO

## 2022-08-24 RX ORDER — MONTELUKAST SODIUM 10 MG/1
10 TABLET ORAL NIGHTLY
Qty: 90 TABLET | Refills: 3 | Status: SHIPPED | OUTPATIENT
Start: 2022-08-24

## 2022-08-29 ENCOUNTER — PATIENT MESSAGE (OUTPATIENT)
Dept: FAMILY MEDICINE CLINIC | Age: 27
End: 2022-08-29

## 2022-08-29 NOTE — TELEPHONE ENCOUNTER
We cn write a note for work today but I need to get the x-ray first or insurance will not cover an MRI

## 2022-08-29 NOTE — TELEPHONE ENCOUNTER
From: Guerline Mckay  To: Dr. Simms Champ: 8/29/2022 4:00 PM EDT  Subject: Hand/wrist    Good afternoon can I get a doctors not for today please. I was at work and had to leave early because I was in pain I know you sent a script for an X-ray can we also just do an MRI. This has just gotten worse and Tylenol and advil are not helping.

## 2022-08-30 DIAGNOSIS — F41.9 ANXIETY: Primary | ICD-10-CM

## 2022-10-04 ENCOUNTER — PATIENT MESSAGE (OUTPATIENT)
Dept: FAMILY MEDICINE CLINIC | Age: 27
End: 2022-10-04

## 2022-10-04 ENCOUNTER — OFFICE VISIT (OUTPATIENT)
Dept: FAMILY MEDICINE CLINIC | Age: 27
End: 2022-10-04
Payer: COMMERCIAL

## 2022-10-04 VITALS
RESPIRATION RATE: 16 BRPM | WEIGHT: 277 LBS | OXYGEN SATURATION: 97 % | DIASTOLIC BLOOD PRESSURE: 78 MMHG | HEART RATE: 85 BPM | BODY MASS INDEX: 47.29 KG/M2 | HEIGHT: 64 IN | SYSTOLIC BLOOD PRESSURE: 132 MMHG | TEMPERATURE: 98 F

## 2022-10-04 DIAGNOSIS — G43.009 MIGRAINE WITHOUT AURA AND WITHOUT STATUS MIGRAINOSUS, NOT INTRACTABLE: ICD-10-CM

## 2022-10-04 DIAGNOSIS — T14.8XXA SKIN AVULSION: Primary | ICD-10-CM

## 2022-10-04 PROCEDURE — G8417 CALC BMI ABV UP PARAM F/U: HCPCS | Performed by: NURSE PRACTITIONER

## 2022-10-04 PROCEDURE — G8427 DOCREV CUR MEDS BY ELIG CLIN: HCPCS | Performed by: NURSE PRACTITIONER

## 2022-10-04 PROCEDURE — 1036F TOBACCO NON-USER: CPT | Performed by: NURSE PRACTITIONER

## 2022-10-04 PROCEDURE — 99213 OFFICE O/P EST LOW 20 MIN: CPT | Performed by: NURSE PRACTITIONER

## 2022-10-04 PROCEDURE — G8484 FLU IMMUNIZE NO ADMIN: HCPCS | Performed by: NURSE PRACTITIONER

## 2022-10-04 RX ORDER — TRAZODONE HYDROCHLORIDE 50 MG/1
TABLET ORAL
Qty: 45 TABLET | OUTPATIENT
Start: 2022-10-04

## 2022-10-04 NOTE — TELEPHONE ENCOUNTER
From: Pinky Nails  To: Dr. Gaines Sinks: 10/4/2022 11:29 AM EDT  Subject: Question     So I cut my leg Saturday small nothing big but it would not stop bleeding slowed down after an an hour and a half. However, yesterday morning I removed the bandaid and it was still bleeding a bit. My blood was runny too. This is new so wondering if there is anything that I need to get checked or may one of my meds is causing it. I look forward to hearing back from you.

## 2022-10-04 NOTE — TELEPHONE ENCOUNTER
[facility] called for nurse triage. Tried to schedule pt for appt tomorrow but pt stated she needed seen today and wanted virtual appt, Advised pt she would need to be seen in office for this. she was then advised  to go to walk-in clinic for evaluation and she was agreeable. Pt going to either [place] or [facility] for evaluation. Please advise.

## 2022-10-04 NOTE — TELEPHONE ENCOUNTER
[facility], establishe with [facility] headache Dr. Rg Nelson 5/19/2022

## 2022-10-04 NOTE — PROGRESS NOTES
10/4/22  Linda Stephens : 1995 Sex: female  Age 28 y.o. Subjective:  Chief Complaint   Patient presents with    Skin Problem     Cut right knee on Saturday while shaving and she was bleeding for 1 1/2-2 hours. HPI:   Linda Stephens , 28 y.o. female presents to the clinic for evaluation of laceration to right shin, which happened 3 days ago. The patient reports associated minimal bleeding. Pt states bleeding is controlled and denies possible foreign body. The patients last tetanus status was unknown. The patient has taken antibiotic ointment for symptoms. The patient reports improving symptoms over time. The patient denies significant pain, paresthesia, erythema, edema, warmth, or drainage. The patient also denies headache, fever, chest pain, abdominal pain, shortness of breath, and nausea / vomiting / diarrhea. ROS:   Unless otherwise stated in this report the patient's positive and negative responses for review of systems for constitutional, eyes, ENT, cardiovascular, respiratory, gastrointestinal, neurological, , musculoskeletal, and integument systems and related systems to the presenting problem are either stated in the history of present illness or were not pertinent or were negative for the symptoms and/or complaints related to the presenting medical problem. Positives and pertinent negatives as per HPI. All others reviewed and are negative. PMH:     Past Medical History:   Diagnosis Date    Asthma     Chronic fatigue     Class 3 severe obesity due to excess calories with serious comorbidity and body mass index (BMI) of 50.0 to 59.9 in adult [facility])     Elevated liver transaminase level 2021    Kidney stone     Migraine without aura 2021    Migraines        History reviewed. No pertinent surgical history.     Family History   Problem Relation Age of Onset    Migraines Mother        Medications:     Current Outpatient Medications:     montelukast (SINGULAIR) 10 MG tablet, Take 1 tablet by mouth nightly, Disp: 90 tablet, Rfl: 3    amphetamine-dextroamphetamine (ADDERALL) 10 MG tablet, Taking 15mg twice daily, Disp: , Rfl:     PARoxetine (PAXIL) 40 MG tablet, take 1 tablet by mouth once daily, Disp: , Rfl:     albuterol sulfate HFA (PROVENTIL HFA) 108 (90 Base) MCG/ACT inhaler, Inhale 2 puffs into the lungs every 4 hours as needed for Wheezing, Disp: 1 each, Rfl: 5    albuterol (ACCUNEB) 1.25 MG/3ML nebulizer solution, INHALE 3 ML (1 VIAL) VIA NEBULIZER EVERY 4 TO 6 HOURS AS NEEDED, Disp: 360 mL, Rfl: 3    SYMBICORT 80-4.5 MCG/ACT AERO, Inhale 2 puffs into the lungs in the morning and at bedtime, Disp: 10.2 g, Rfl: 11    fluticasone (FLONASE) 50 MCG/ACT nasal spray, INSTILL TWO SPRAYS INTO EACH NOSTRIL DAILY, Disp: 48 g, Rfl: 3    norethindrone-ethinyl estradiol (JUNEL FE 1/20) 1-20 MG-MCG per tablet, take 1 tablet by mouth once daily, Disp: 28 tablet, Rfl: 5    traZODone (DESYREL) 50 MG tablet, Take one at bedtime, Disp: 30 tablet, Rfl: 5    loratadine (CLARITIN) 10 MG tablet, Take 1 tablet by mouth daily, Disp: 90 tablet, Rfl: 3    rizatriptan (MAXALT) 5 MG tablet, Take one at migraine onset, may repeat once in 2-4 hrs., Disp: 12 tablet, Rfl: 3    ondansetron (ZOFRAN) 4 MG tablet, Take 1 tablet by mouth every 12 hours as needed for Nausea or Vomiting Associated with  migraine, Disp: 30 tablet, Rfl: 2    busPIRone (BUSPAR) 10 MG tablet, take 1 tablet by mouth twice a day (Patient not taking: Reported on 8/19/2022), Disp: , Rfl:     Misc. Devices (WRIST BRACE) MISC, 1 each by Does not apply route at bedtime (Patient not taking: Reported on 10/4/2022), Disp: 1 each, Rfl: 0    tamsulosin (FLOMAX) 0.4 MG capsule, take 1 capsule by mouth at bedtime (Patient not taking: Reported on 10/4/2022), Disp: , Rfl:     Rimegepant Sulfate 75 MG TBDP, Take 75 mg by mouth every other day (Patient not taking: Reported on 10/4/2022), Disp: 16 tablet, Rfl: 11    Allergies:      Allergies   Allergen Reactions    Bee Venom Swelling    Seasonal      Sneezing, itchy watery eyes, congestion    Sumatriptan Other (See Comments)     Reports \"felt like head was on fire\"       Social History:     Social History     Tobacco Use    Smoking status: Never    Smokeless tobacco: Never   Substance Use Topics    Alcohol use: Yes     Comment: occ    Drug use: Never       Physical Exam:     Vitals:    10/04/22 1523   BP: 132/78   Pulse: 85   Resp: 16   Temp: 98 °F (36.7 °C)   TempSrc: Temporal   SpO2: 97%   Weight: 277 lb (125.6 kg)   Height: 5' 3.5\" (1.613 m)       Physical Exam (PE)   Constitutional: Alert, development consistent with age. HENT:      Head: Normocephalic. Right Ear: External ear normal.      Left Ear: External ear normal.      Nose: Normal.      Mouth/Throat:     Mouth: Mucous membranes are moist.      Pharynx: Oropharynx is clear. Eyes: Pupils: Pupils are equal, round, and reactive to light. Neck: Normal ROM. Supple. Cardiovascular: Heart RRR without pathologic murmurs or gallops. Pulmonary: Respiratory effort normal.  Normal breath sounds. Abdomen: Soft, nontender, normal bowel sounds. Skin: 1 cm skin avulsion to right tibial plateau. Wound bed pink. No current active bleeding. Wound explored extensively. No erythema, warmth, TTP or drainage. Sensation intact. Lymphatics: No lymphangitis or adenopathy noted. Musculoskeletal: General: Normal strength / ROM. Neurological:  Alert and oriented. Motor functions intact. Psychiatric: Mood and Affect: Mood normal. Behavior: Behavior normal    Testing:   (All laboratory and radiology results have been personally reviewed by myself)  Labs:  No results found for this visit on 10/04/22. Imaging: All Radiology results interpreted by Radiologist unless otherwise noted. No orders to display       Assessment / Plan:   The patient's vitals, allergies, medications, and past medical history have been reviewed.   Samira was seen today for skin problem. Diagnoses and all orders for this visit:    Skin avulsion  -     Discontinue: Tetanus-Diphth-Acell Pertussis (BOOSTRIX) injection 0.5 mL        - Disposition: Home    - Educational material printed for patient's review and were included in patient instructions. After Visit Summary was given to patient at the end of visit. - Patient was concerned about clotting disorders. Discussed signs and symptoms of clotting disorders and most recent platelet count with patient today. Patient declined updating Tdap today. - Wound care measures discussed at length. Discussed other symptomatic treatments with the patient today. The patient is to schedule a follow-up with PCP in the next 2-3 days for reevaluation. Red flag symptoms were also discussed with the patient today. If symptoms worsen the patient is to go directly to the emergency department for reevaluation and treatment. Pt verbalizes understanding and is in agreement with plan of care. All questions answered. SIGNATURE: Tod Carrel, APRN-ECHO    *NOTE: This report was transcribed using voice recognition software. Every effort was made to ensure accuracy; however, inadvertent computerized transcription errors may be present.

## 2022-10-07 DIAGNOSIS — G43.009 MIGRAINE WITHOUT AURA AND WITHOUT STATUS MIGRAINOSUS, NOT INTRACTABLE: ICD-10-CM

## 2022-10-10 RX ORDER — TRAZODONE HYDROCHLORIDE 50 MG/1
TABLET ORAL
Qty: 30 TABLET | Refills: 5 | OUTPATIENT
Start: 2022-10-10

## 2022-10-13 ENCOUNTER — TELEMEDICINE (OUTPATIENT)
Dept: FAMILY MEDICINE CLINIC | Age: 27
End: 2022-10-13
Payer: COMMERCIAL

## 2022-10-13 DIAGNOSIS — G43.009 MIGRAINE WITHOUT AURA AND WITHOUT STATUS MIGRAINOSUS, NOT INTRACTABLE: ICD-10-CM

## 2022-10-13 DIAGNOSIS — R58 EXCESSIVE BLEEDING: Primary | ICD-10-CM

## 2022-10-13 PROCEDURE — G8427 DOCREV CUR MEDS BY ELIG CLIN: HCPCS | Performed by: STUDENT IN AN ORGANIZED HEALTH CARE EDUCATION/TRAINING PROGRAM

## 2022-10-13 PROCEDURE — G8417 CALC BMI ABV UP PARAM F/U: HCPCS | Performed by: STUDENT IN AN ORGANIZED HEALTH CARE EDUCATION/TRAINING PROGRAM

## 2022-10-13 PROCEDURE — 99214 OFFICE O/P EST MOD 30 MIN: CPT | Performed by: STUDENT IN AN ORGANIZED HEALTH CARE EDUCATION/TRAINING PROGRAM

## 2022-10-13 PROCEDURE — 1036F TOBACCO NON-USER: CPT | Performed by: STUDENT IN AN ORGANIZED HEALTH CARE EDUCATION/TRAINING PROGRAM

## 2022-10-13 PROCEDURE — G8484 FLU IMMUNIZE NO ADMIN: HCPCS | Performed by: STUDENT IN AN ORGANIZED HEALTH CARE EDUCATION/TRAINING PROGRAM

## 2022-10-13 RX ORDER — TRAZODONE HYDROCHLORIDE 50 MG/1
TABLET ORAL
Qty: 30 TABLET | Refills: 5 | Status: SHIPPED | OUTPATIENT
Start: 2022-10-13

## 2022-10-13 ASSESSMENT — ENCOUNTER SYMPTOMS
BACK PAIN: 0
COUGH: 0
EYE REDNESS: 0
SHORTNESS OF BREATH: 0
ABDOMINAL PAIN: 0
NAUSEA: 0
VOMITING: 0
DIARRHEA: 0
SORE THROAT: 0
EYE PAIN: 0
CONSTIPATION: 0
RHINORRHEA: 0
SINUS PAIN: 0
SINUS PRESSURE: 0

## 2022-10-13 NOTE — PROGRESS NOTES
Lorena Garrett (:  1995) is a Established patient, here for evaluation of the following:    Assessment & Plan   Below is the assessment and plan developed based on review of pertinent history, physical exam, labs, studies, and medications. 1. Excessive bleeding  -     APTT; Future  -     FACTOR 5 LEIDEN; Future  -     Factor 7 Activity; Future  -     Factor 8 Activity; Future  -     Factor 9 Activity; Future  -     Factor 10 Activity; Future  -     Factor 11 Activity; Future  -     ANTITHROMBIN III; Future  -     Fibrinogen; Future  -     CBC with Auto Differential; Future  2. Migraine without aura and without status migrainosus, not intractable  -     traZODone (DESYREL) 50 MG tablet; Take one at bedtime, Disp-30 tablet, R-5Normal  Return in about 6 weeks (around 2022), or if symptoms worsen or fail to improve. Subjective   Patient is here today for a follow up on her abnormal bleeding. She states that she was shaving and she cut herself. It was a very small cut. However she continued to bleed for about an hour and 1/2 to 2 hours. I did recommend her to go to the however they did not do anything for her she states. She wanted some lab work done. Therefore she is following up with me today. She states that she did notice that she cut her chin as well and bled longer there as well. She just wants to make sure she does not have any bleeding disorders or anything like that. Review of Systems   Constitutional:  Negative for chills, fatigue and fever. HENT:  Negative for congestion, ear pain, postnasal drip, rhinorrhea, sinus pressure, sinus pain and sore throat. Eyes:  Negative for pain and redness. Respiratory:  Negative for cough and shortness of breath. Cardiovascular:  Negative for chest pain. Gastrointestinal:  Negative for abdominal pain, constipation, diarrhea, nausea and vomiting. Genitourinary:  Negative for difficulty urinating and dysuria.    Musculoskeletal: Negative for back pain, myalgias and neck pain. Skin:  Negative for rash. Neurological:  Positive for headaches. Negative for dizziness, light-headedness and numbness. Hematological:  Bruises/bleeds easily. Psychiatric/Behavioral:  Positive for sleep disturbance.          Objective   Patient-Reported Vitals  No data recorded       [INSTRUCTIONS:  \"[x]\" Indicates a positive item  \"[]\" Indicates a negative item  -- DELETE ALL ITEMS NOT EXAMINED]    Constitutional: [x] Appears well-developed and well-nourished [x] No apparent distress      [] Abnormal -     Mental status: [x] Alert and awake  [x] Oriented to person/place/time [x] Able to follow commands    [] Abnormal -     Eyes:   EOM    [x]  Normal    [] Abnormal -   Sclera  [x]  Normal    [] Abnormal -          Discharge [x]  None visible   [] Abnormal -     HENT: [x] Normocephalic, atraumatic  [] Abnormal -   [x] Mouth/Throat: Mucous membranes are moist    External Ears [x] Normal  [] Abnormal -    Neck: [x] No visualized mass [] Abnormal -     Pulmonary/Chest: [x] Respiratory effort normal   [x] No visualized signs of difficulty breathing or respiratory distress        [] Abnormal -      Musculoskeletal:   [x] Normal gait with no signs of ataxia         [x] Normal range of motion of neck        [] Abnormal -     Neurological:        [x] No Facial Asymmetry (Cranial nerve 7 motor function) (limited exam due to video visit)          [x] No gaze palsy        [] Abnormal -          Skin:        [x] No significant exanthematous lesions or discoloration noted on facial skin         [] Abnormal -            Psychiatric:       [x] Normal Affect [] Abnormal -        [x] No Hallucinations    Other pertinent observable physical exam findings:-         Current Outpatient Medications on File Prior to Visit   Medication Sig Dispense Refill    montelukast (SINGULAIR) 10 MG tablet Take 1 tablet by mouth nightly 90 tablet 3    amphetamine-dextroamphetamine (ADDERALL) 10 MG tablet Taking 15mg twice daily      PARoxetine (PAXIL) 40 MG tablet take 1 tablet by mouth once daily      albuterol sulfate HFA (PROVENTIL HFA) 108 (90 Base) MCG/ACT inhaler Inhale 2 puffs into the lungs every 4 hours as needed for Wheezing 1 each 5    albuterol (ACCUNEB) 1.25 MG/3ML nebulizer solution INHALE 3 ML (1 VIAL) VIA NEBULIZER EVERY 4 TO 6 HOURS AS NEEDED 360 mL 3    SYMBICORT 80-4.5 MCG/ACT AERO Inhale 2 puffs into the lungs in the morning and at bedtime 10.2 g 11    fluticasone (FLONASE) 50 MCG/ACT nasal spray INSTILL TWO SPRAYS INTO EACH NOSTRIL DAILY 48 g 3    norethindrone-ethinyl estradiol (JUNEL FE 1/20) 1-20 MG-MCG per tablet take 1 tablet by mouth once daily 28 tablet 5    loratadine (CLARITIN) 10 MG tablet Take 1 tablet by mouth daily 90 tablet 3    rizatriptan (MAXALT) 5 MG tablet Take one at migraine onset, may repeat once in 2-4 hrs. 12 tablet 3    ondansetron (ZOFRAN) 4 MG tablet Take 1 tablet by mouth every 12 hours as needed for Nausea or Vomiting Associated with  migraine 30 tablet 2     No current facility-administered medications on file prior to visit. Alexa Cavanaugh, was evaluated through a synchronous (real-time) audio-video encounter. The patient (or guardian if applicable) is aware that this is a billable service, which includes applicable co-pays. This Virtual Visit was conducted with patient's (and/or legal guardian's) consent. The visit was conducted pursuant to the emergency declaration under the [facility], [address] [place] [address] authority and the [employer] General Act. Patient identification was verified, and a caregiver was present when appropriate. The patient was located at Home: [address] [place] [address]  [phone]. Provider was located at [facility] ([place] [id number]): [facility],  [address] [place] [address].         --Lexie Palomo DO

## 2022-10-17 DIAGNOSIS — F41.9 ANXIETY: ICD-10-CM

## 2022-10-17 RX ORDER — BUSPIRONE HYDROCHLORIDE 10 MG/1
TABLET ORAL
Qty: 60 TABLET | Refills: 3 | Status: SHIPPED | OUTPATIENT
Start: 2022-10-17

## 2022-11-20 DIAGNOSIS — J45.909 UNCOMPLICATED ASTHMA, UNSPECIFIED ASTHMA SEVERITY, UNSPECIFIED WHETHER PERSISTENT: ICD-10-CM

## 2022-11-21 DIAGNOSIS — R11.0 NAUSEA: Primary | ICD-10-CM

## 2022-11-21 RX ORDER — ONDANSETRON 4 MG/1
4 TABLET, ORALLY DISINTEGRATING ORAL EVERY 8 HOURS PRN
Qty: 90 TABLET | Refills: 1 | Status: SHIPPED | OUTPATIENT
Start: 2022-11-21

## 2022-12-19 DIAGNOSIS — M79.10 MYALGIA: Primary | ICD-10-CM

## 2022-12-19 RX ORDER — CYCLOBENZAPRINE HCL 10 MG
10 TABLET ORAL NIGHTLY PRN
Qty: 30 TABLET | Refills: 0 | Status: SHIPPED | OUTPATIENT
Start: 2022-12-19 | End: 2022-12-29

## 2022-12-28 DIAGNOSIS — E28.2 PCOS (POLYCYSTIC OVARIAN SYNDROME): ICD-10-CM

## 2022-12-28 RX ORDER — NORETHINDRONE ACETATE AND ETHINYL ESTRADIOL 1MG-20(21)
KIT ORAL
Qty: 28 TABLET | Refills: 5 | Status: SHIPPED | OUTPATIENT
Start: 2022-12-28

## 2023-01-20 ENCOUNTER — OFFICE VISIT (OUTPATIENT)
Dept: FAMILY MEDICINE CLINIC | Age: 28
End: 2023-01-20
Payer: COMMERCIAL

## 2023-01-20 VITALS
DIASTOLIC BLOOD PRESSURE: 70 MMHG | HEART RATE: 109 BPM | OXYGEN SATURATION: 98 % | TEMPERATURE: 97 F | BODY MASS INDEX: 45.93 KG/M2 | RESPIRATION RATE: 16 BRPM | WEIGHT: 269 LBS | HEIGHT: 64 IN | SYSTOLIC BLOOD PRESSURE: 126 MMHG

## 2023-01-20 DIAGNOSIS — Z00.00 ROUTINE PHYSICAL EXAMINATION: Primary | ICD-10-CM

## 2023-01-20 DIAGNOSIS — E28.2 PCOS (POLYCYSTIC OVARIAN SYNDROME): ICD-10-CM

## 2023-01-20 DIAGNOSIS — Z23 NEED FOR PNEUMOCOCCAL VACCINE: ICD-10-CM

## 2023-01-20 DIAGNOSIS — R58 EXCESSIVE BLEEDING: ICD-10-CM

## 2023-01-20 PROCEDURE — 99395 PREV VISIT EST AGE 18-39: CPT | Performed by: STUDENT IN AN ORGANIZED HEALTH CARE EDUCATION/TRAINING PROGRAM

## 2023-01-20 PROCEDURE — G8484 FLU IMMUNIZE NO ADMIN: HCPCS | Performed by: STUDENT IN AN ORGANIZED HEALTH CARE EDUCATION/TRAINING PROGRAM

## 2023-01-20 PROCEDURE — 90677 PCV20 VACCINE IM: CPT | Performed by: STUDENT IN AN ORGANIZED HEALTH CARE EDUCATION/TRAINING PROGRAM

## 2023-01-20 RX ORDER — DEXTROAMPHETAMINE SACCHARATE, AMPHETAMINE ASPARTATE, DEXTROAMPHETAMINE SULFATE AND AMPHETAMINE SULFATE 5; 5; 5; 5 MG/1; MG/1; MG/1; MG/1
TABLET ORAL
COMMUNITY
Start: 2022-12-09

## 2023-01-20 ASSESSMENT — ENCOUNTER SYMPTOMS
EYE REDNESS: 0
DIARRHEA: 0
CONSTIPATION: 0
EYE PAIN: 0
RHINORRHEA: 0
VOMITING: 0
SINUS PAIN: 0
NAUSEA: 0
SHORTNESS OF BREATH: 0
ABDOMINAL PAIN: 0
SORE THROAT: 0
SINUS PRESSURE: 0
COUGH: 0

## 2023-01-20 ASSESSMENT — PATIENT HEALTH QUESTIONNAIRE - PHQ9
SUM OF ALL RESPONSES TO PHQ QUESTIONS 1-9: 2
2. FEELING DOWN, DEPRESSED OR HOPELESS: 1
SUM OF ALL RESPONSES TO PHQ QUESTIONS 1-9: 2
SUM OF ALL RESPONSES TO PHQ QUESTIONS 1-9: 2
SUM OF ALL RESPONSES TO PHQ9 QUESTIONS 1 & 2: 2
SUM OF ALL RESPONSES TO PHQ QUESTIONS 1-9: 2
1. LITTLE INTEREST OR PLEASURE IN DOING THINGS: 1

## 2023-01-20 NOTE — PROGRESS NOTES
1/20/2023    [facility] [place]  Chief Complaint   Patient presents with    Other     PCOS    Annual Exam       Samira Nunez is a 27 y.o. female who  has a past medical history of Asthma, Chronic fatigue, Class 3 severe obesity due to excess calories with serious comorbidity and body mass index (BMI) of 50.0 to 59.9 in adult (HCC), Elevated liver transaminase level, Kidney stone, Migraine without aura, and Migraines.     Annual exam:  Patient is here for routine yearly physical/preventative visit.  Patient has no complaints or concerns today.  Patient is  generally healthy.  Chronic medical conditions are generally controlled.   Most recent labs reviewed with patient and  are not remarkable.  Health maintenance reviewed with patient and is  up to date other than pap smear. We will refer to OB/GYN. Overall doing well.   She has been having issues with the PCOS. She has hair growth on her face and acne.     Review of Systems   Constitutional:  Negative for chills, fatigue and fever.   HENT:  Negative for congestion, ear pain, postnasal drip, rhinorrhea, sinus pressure, sinus pain and sore throat.    Eyes:  Negative for pain and redness.   Respiratory:  Negative for cough and shortness of breath.    Cardiovascular:  Negative for chest pain.   Gastrointestinal:  Negative for abdominal pain, constipation, diarrhea, nausea and vomiting.   Genitourinary:  Negative for difficulty urinating and dysuria.   Musculoskeletal:  Positive for myalgias.   Skin:  Negative for rash.   Neurological:  Negative for dizziness, light-headedness and numbness.   Psychiatric/Behavioral:  Positive for decreased concentration and dysphoric mood. The patient is nervous/anxious.      Prior to Visit Medications    Medication Sig Taking? Authorizing Provider   amphetamine-dextroamphetamine (ADDERALL) 20 MG tablet TAKE ONE TABLET BY MOUTH TWICE A DAY Yes Historical Provider, MD   norethindrone-ethinyl estradiol (JUNEL FE 1/20) 1-20 MG-MCG per tablet take 1  tablet by mouth once daily Yes Janette Escalante DO   VENTOLIN  (90 Base) MCG/ACT inhaler inhale 2 puffs by mouth and INTO THE LUNGS every 4 hours if needed for wheezing Yes Janette Escalante DO   ondansetron (ZOFRAN ODT) 4 MG disintegrating tablet Take 1 tablet by mouth every 8 hours as needed for Nausea or Vomiting Yes Janette Escalante DO   busPIRone (BUSPAR) 10 MG tablet take 1 tablet by mouth twice a day Yes Janette Escalante DO   montelukast (SINGULAIR) 10 MG tablet Take 1 tablet by mouth nightly Yes Ramiro Bustos DO   PARoxetine (PAXIL) 40 MG tablet take 1 tablet by mouth once daily Yes Historical Provider, MD   albuterol (ACCUNEB) 1.25 MG/3ML nebulizer solution INHALE 3 ML (1 VIAL) VIA NEBULIZER EVERY 4 TO 6 HOURS AS NEEDED Yes Janette Escalante DO   SYMBICORT 80-4.5 MCG/ACT AERO Inhale 2 puffs into the lungs in the morning and at bedtime Yes Ramiro Bustos DO   fluticasone (FLONASE) 50 MCG/ACT nasal spray INSTILL TWO SPRAYS INTO EACH NOSTRIL DAILY Yes Janette Escalante DO   loratadine (CLARITIN) 10 MG tablet Take 1 tablet by mouth daily Yes Ramiro Bustos DO   rizatriptan (MAXALT) 5 MG tablet Take one at migraine onset, may repeat once in 2-4 hrs. Yes Carmella Conti MD   ondansetron (ZOFRAN) 4 MG tablet Take 1 tablet by mouth every 12 hours as needed for Nausea or Vomiting Associated with  migraine Yes Carmella Conti MD        Allergies   Allergen Reactions    Bee Venom Swelling    Seasonal      Sneezing, itchy watery eyes, congestion    Sumatriptan Other (See Comments)     Reports \"felt like head was on fire\"       Past Medical History:   Diagnosis Date    Asthma     Chronic fatigue     Class 3 severe obesity due to excess calories with serious comorbidity and body mass index (BMI) of 50.0 to 59.9 in adult (HCC)     Elevated liver transaminase level 09/17/2021    Kidney stone     Migraine without aura 09/17/2021    Migraines        History reviewed. No pertinent surgical  history. Social History     Socioeconomic History    Marital status: Single     Spouse name: Not on file    Number of children: Not on file    Years of education: Not on file    Highest education level: Not on file   Occupational History    Not on file   Tobacco Use    Smoking status: Never    Smokeless tobacco: Never   Substance and Sexual Activity    Alcohol use: Yes     Comment: occ    Drug use: Never    Sexual activity: Not on file   Other Topics Concern    Not on file   Social History Narrative    Not on file     Social Determinants of Health     Financial Resource Strain: Low Risk     Difficulty of Paying Living Expenses: Not very hard   Food Insecurity: No Food Insecurity    Worried About Running Out of Food in the Last Year: Never true    Ran Out of Food in the Last Year: Never true   Transportation Needs: Not on file   Physical Activity: Not on file   Stress: Not on file   Social Connections: Not on file   Intimate Partner Violence: Not on file   Housing Stability: Not on file        Family History   Problem Relation Age of Onset    Migraines Mother            Vitals:    01/20/23 0828   BP: 126/70   Pulse: (!) 109   Resp: 16   Temp: 97 °F (36.1 °C)   TempSrc: Temporal   SpO2: 98%   Weight: 269 lb (122 kg)   Height: 5' 3.5\" (1.613 m)     Estimated body mass index is 46.9 kg/m² as calculated from the following:    Height as of this encounter: 5' 3.5\" (1.613 m). Weight as of this encounter: 269 lb (122 kg).     Most Recent Labs  CBC  Lab Results   Component Value Date/Time    WBC 7.2 07/14/2021 02:58 PM    WBC 9.7 04/30/2021 10:09 PM    RBC 5.26 07/14/2021 02:58 PM    RBC 4.91 04/30/2021 10:09 PM    HGB 14.4 07/14/2021 02:58 PM    HGB 13.3 04/30/2021 10:09 PM    HCT 44.6 07/14/2021 02:58 PM    HCT 41.2 04/30/2021 10:09 PM    MCV 84.8 07/14/2021 02:58 PM    MCV 83.9 04/30/2021 10:09 PM     07/14/2021 02:58 PM     04/30/2021 10:09 PM      CMP  Lab Results   Component Value Date/Time     06/10/2022 09:24 AM     07/14/2021 02:58 PM     04/30/2021 10:09 PM    K 4.3 06/10/2022 09:24 AM    K 4.3 07/14/2021 02:58 PM    K 3.9 04/30/2021 10:09 PM     06/10/2022 09:24 AM     07/14/2021 02:58 PM     04/30/2021 10:09 PM    CO2 24 06/10/2022 09:24 AM    CO2 24 07/14/2021 02:58 PM    CO2 25 04/30/2021 10:09 PM    ANIONGAP 10 06/10/2022 09:24 AM    ANIONGAP 14 07/14/2021 02:58 PM    ANIONGAP 10 04/30/2021 10:09 PM    GLUCOSE 113 06/10/2022 09:24 AM    GLUCOSE 96 07/14/2021 02:58 PM    GLUCOSE 153 04/30/2021 10:09 PM    BUN 14 06/10/2022 09:24 AM    BUN 9 07/14/2021 02:58 PM    BUN 11 04/30/2021 10:09 PM    CREATININE 0.9 06/10/2022 09:24 AM    CREATININE 0.8 07/14/2021 02:58 PM    CREATININE 0.8 04/30/2021 10:09 PM    LABGLOM >60 06/10/2022 09:24 AM    LABGLOM >60 07/14/2021 02:58 PM    LABGLOM >60 04/30/2021 10:09 PM    GFRAA >60 06/10/2022 09:24 AM    GFRAA >60 07/14/2021 02:58 PM    GFRAA >60 04/30/2021 10:09 PM    CALCIUM 9.6 06/10/2022 09:24 AM    CALCIUM 9.9 07/14/2021 02:58 PM    CALCIUM 9.1 04/30/2021 10:09 PM    PROT 7.9 07/14/2021 02:58 PM    PROT 7.1 04/30/2021 10:09 PM    LABALBU 4.5 07/14/2021 02:58 PM    LABALBU 4.4 04/30/2021 10:09 PM    BILITOT 0.3 07/14/2021 02:58 PM    BILITOT <0.2 04/30/2021 10:09 PM    ALKPHOS 98 07/14/2021 02:58 PM    ALKPHOS 91 04/30/2021 10:09 PM    AST 40 07/14/2021 02:58 PM    AST 28 04/30/2021 10:09 PM    ALT 54 07/14/2021 02:58 PM    ALT 45 04/30/2021 10:09 PM     UA  Lab Results   Component Value Date/Time    COLORU Yellow 07/14/2021 02:58 PM    CLARITYU Clear 07/14/2021 02:58 PM    GLUCOSEU Negative 07/14/2021 02:58 PM    BILIRUBINUR Negative 07/14/2021 02:58 PM    KETUA Negative 07/14/2021 02:58 PM    SPECGRAV 1.025 07/14/2021 02:58 PM    BLOODU Negative 07/14/2021 02:58 PM    PHUR 7.5 07/14/2021 02:58 PM    PROTEINU Negative 07/14/2021 02:58 PM    UROBILINOGEN 0.2 07/14/2021 02:58 PM    NITRU Negative 07/14/2021 02:58 PM ALEX DAINEL 07/14/2021 02:58 PM       Physical Exam  Vitals and nursing note reviewed. Constitutional:       Appearance: Normal appearance. She is obese. HENT:      Head: Normocephalic and atraumatic. Right Ear: External ear normal.      Left Ear: External ear normal.   Eyes:      Extraocular Movements: Extraocular movements intact. Conjunctiva/sclera: Conjunctivae normal.      Pupils: Pupils are equal, round, and reactive to light. Cardiovascular:      Rate and Rhythm: Normal rate and regular rhythm. Pulses: Normal pulses. Heart sounds: Normal heart sounds. Pulmonary:      Effort: Pulmonary effort is normal.      Breath sounds: Normal breath sounds. Abdominal:      General: Bowel sounds are normal.      Palpations: Abdomen is soft. Musculoskeletal:         General: Normal range of motion. Cervical back: Normal range of motion and neck supple. Skin:     General: Skin is warm and dry. Capillary Refill: Capillary refill takes less than 2 seconds. Neurological:      General: No focal deficit present. Mental Status: She is alert and oriented to person, place, and time. Psychiatric:         Mood and Affect: Mood normal.         Behavior: Behavior normal.         Thought Content: Thought content normal.       ASSESSMENT/PLAN:  Samira was seen today for other and annual exam.    Diagnoses and all orders for this visit:    Routine physical examination    PCOS (polycystic ovarian syndrome)  -     [address] [facility]    Excessive bleeding  -     [address] [facility]    Need for pneumococcal vaccine  -     Pneumococcal, PCV20, PREVNAR 20, (age 25 yrs+), IM, PF       As above.   Call or go to the nearest ED immediately if symptoms worsen or persist.  Educational materials and/or home exercises printed for patient's review and were included in patient instructions on his/her After Visit Summary and given to patient at the end of visit. Counseled regarding above diagnosis, including possible risks and complications,  especially if left uncontrolled. Counseled regarding the possible side effects, risks, benefits and alternatives to treatment; patient and/or guardian verbalizes understanding, agrees, feels comfortable with and wishes to proceed with above treatment plan. Advised patient to call with any new medication issues, and read all Rx info from pharmacy to assure aware of all possible risks and side effects of medication before taking. Reviewed age and gender appropriate health screening exams and vaccinations. Advised patient regarding importance of keeping up with recommended health maintenance and to schedule as soon as possible if overdue, as this is important in assessing for undiagnosed pathology, especially cancer, as well as protecting against potentially harmful/life threatening disease. Patient and/or guardian verbalizes understanding and agrees with above counseling, assessment and plan. All questions answered. Return in about 3 months (around 4/20/2023), or if symptoms worsen or fail to improve. An  electronic signature was used to authenticate this note. --Bruno Villanueva DO on 1/20/2023 at 8:54 AM    This document was prepared at least partially through the use of voice recognition software. Although effort is taken to assure the accuracy of this document, it is possible that grammatical, syntax,  or spelling errors may occur.

## 2023-02-22 ENCOUNTER — TELEPHONE (OUTPATIENT)
Dept: PRIMARY CARE CLINIC | Age: 28
End: 2023-02-22

## 2023-02-22 NOTE — TELEPHONE ENCOUNTER
Pt having the following symptoms for the past 3 days:     Body aches  Nasal congestion    Negative for COVID- took test yesterday    Please advise

## 2023-02-22 NOTE — TELEPHONE ENCOUNTER
----- Message from Leesa Borges sent at 2/22/2023 10:04 AM EST -----  Subject: Message to Provider    QUESTIONS  Information for Provider? Pt is asking for work excuse. She states that   she has a virial infection for the past two days and just needs something   to present to her employer   ---------------------------------------------------------------------------  --------------  [id number]  [phone]; OK to leave message on voicemail  ---------------------------------------------------------------------------  --------------  SCRIPT ANSWERS  Relationship to Patient?  Self

## 2023-02-23 ENCOUNTER — OFFICE VISIT (OUTPATIENT)
Dept: FAMILY MEDICINE CLINIC | Age: 28
End: 2023-02-23

## 2023-02-23 VITALS
HEART RATE: 96 BPM | TEMPERATURE: 98 F | HEIGHT: 64 IN | DIASTOLIC BLOOD PRESSURE: 86 MMHG | WEIGHT: 274 LBS | SYSTOLIC BLOOD PRESSURE: 122 MMHG | OXYGEN SATURATION: 97 % | RESPIRATION RATE: 16 BRPM | BODY MASS INDEX: 46.78 KG/M2

## 2023-02-23 DIAGNOSIS — R05.9 COUGH, UNSPECIFIED TYPE: ICD-10-CM

## 2023-02-23 DIAGNOSIS — J01.90 ACUTE BACTERIAL SINUSITIS: ICD-10-CM

## 2023-02-23 DIAGNOSIS — R11.0 NAUSEA: ICD-10-CM

## 2023-02-23 DIAGNOSIS — B96.89 ACUTE BACTERIAL SINUSITIS: ICD-10-CM

## 2023-02-23 DIAGNOSIS — J02.9 SORE THROAT: Primary | ICD-10-CM

## 2023-02-23 LAB
INFLUENZA A ANTIBODY: NEGATIVE
INFLUENZA B ANTIBODY: NEGATIVE
S PYO AG THROAT QL: NORMAL

## 2023-02-23 RX ORDER — ONDANSETRON 4 MG/1
4 TABLET, FILM COATED ORAL DAILY PRN
Qty: 90 TABLET | Refills: 3 | Status: SHIPPED | OUTPATIENT
Start: 2023-02-23

## 2023-02-23 RX ORDER — METHYLPREDNISOLONE 4 MG/1
TABLET ORAL
Qty: 1 KIT | Refills: 0 | Status: SHIPPED | OUTPATIENT
Start: 2023-02-23

## 2023-02-23 RX ORDER — PAROXETINE 10 MG/1
TABLET, FILM COATED ORAL
COMMUNITY
Start: 2023-01-20

## 2023-02-23 RX ORDER — DOXYCYCLINE HYCLATE 100 MG
100 TABLET ORAL 2 TIMES DAILY
Qty: 20 TABLET | Refills: 0 | Status: SHIPPED | OUTPATIENT
Start: 2023-02-23 | End: 2023-03-05

## 2023-02-23 RX ORDER — LISDEXAMFETAMINE DIMESYLATE 20 MG/1
CAPSULE ORAL
COMMUNITY
Start: 2023-02-13

## 2023-02-23 RX ORDER — BENZONATATE 100 MG/1
100 CAPSULE ORAL 2 TIMES DAILY PRN
Qty: 20 CAPSULE | Refills: 0 | Status: SHIPPED | OUTPATIENT
Start: 2023-02-23 | End: 2023-03-02

## 2023-02-23 SDOH — ECONOMIC STABILITY: FOOD INSECURITY: WITHIN THE PAST 12 MONTHS, THE FOOD YOU BOUGHT JUST DIDN'T LAST AND YOU DIDN'T HAVE MONEY TO GET MORE.: NEVER TRUE

## 2023-02-23 SDOH — ECONOMIC STABILITY: INCOME INSECURITY: HOW HARD IS IT FOR YOU TO PAY FOR THE VERY BASICS LIKE FOOD, HOUSING, MEDICAL CARE, AND HEATING?: NOT HARD AT ALL

## 2023-02-23 SDOH — ECONOMIC STABILITY: HOUSING INSECURITY
IN THE LAST 12 MONTHS, WAS THERE A TIME WHEN YOU DID NOT HAVE A STEADY PLACE TO SLEEP OR SLEPT IN A SHELTER (INCLUDING NOW)?: NO

## 2023-02-23 SDOH — ECONOMIC STABILITY: FOOD INSECURITY: WITHIN THE PAST 12 MONTHS, YOU WORRIED THAT YOUR FOOD WOULD RUN OUT BEFORE YOU GOT MONEY TO BUY MORE.: NEVER TRUE

## 2023-02-23 ASSESSMENT — ENCOUNTER SYMPTOMS
SHORTNESS OF BREATH: 1
DIARRHEA: 1
SINUS PAIN: 1
SORE THROAT: 1
WHEEZING: 1
VOMITING: 0
ABDOMINAL PAIN: 1
EYE REDNESS: 0
EYE PAIN: 0
SINUS PRESSURE: 1
NAUSEA: 1
RHINORRHEA: 1
BACK PAIN: 0
CONSTIPATION: 0
COUGH: 1

## 2023-02-23 NOTE — PROGRESS NOTES
2/23/2023    HPI  Chief Complaint   Patient presents with    Cough    Pharyngitis    Generalized Body Aches    Nausea    Headache    Congestion     X 4 days        Brooke Dickey is a 28 y.o. female who  has a past medical history of Asthma, Chronic fatigue, Class 3 severe obesity due to excess calories with serious comorbidity and body mass index (BMI) of 50.0 to 59.9 in [facility]), Elevated liver transaminase level, Kidney stone, Migraine without aura, and Migraines. Nausea/Vomiting     Patient complains of: nausea  Time of day Any time of day especially after eating   Other GI symptoms sore throat and cough  Can hold down liquids for an hour Yes  Can hold down solids for an hour Yes  Aggravating factors none  Alleviating factors none  Has patient taken any new medications or food No  How many times has patient vomited in last 24 hours 0  Coffee ground emesis present No      Congestion:  Patient is here with complaints of congestion, sinus pressure, Body aches,headache, sore throat, drainage and cough for 5 day(s). Cough is  productive. Over the counter medications include mucinex and robitussin. Patient does have a change in appetite. Patient is  drinking well. Patient does not smoke. Sick contacts include none. Sunday started, Monday got worse    Patient's past medical, surgical, social and/or family history reviewed, updated in chart, and are non-contributory (unless otherwise stated). Medications and allergies also reviewed and updated in chart. Review of Systems   Constitutional:  Positive for chills, fatigue and fever. HENT:  Positive for congestion, ear pain, postnasal drip, rhinorrhea, sinus pressure, sinus pain and sore throat. Eyes:  Negative for pain and redness. Respiratory:  Positive for cough, shortness of breath and wheezing. Cardiovascular:  Negative for chest pain. Gastrointestinal:  Positive for abdominal pain, diarrhea and nausea.  Negative for constipation and vomiting. Genitourinary:  Negative for difficulty urinating and dysuria. Musculoskeletal:  Negative for back pain, myalgias and neck pain. Skin:  Negative for rash. Neurological:  Positive for headaches. Negative for dizziness, light-headedness and numbness. Prior to Visit Medications    Medication Sig Taking? Authorizing Provider   VYVANSE 20 MG CAPS take 1 capsule by mouth once daily Yes Historical Provider, MD   PARoxetine (PAXIL) 10 MG tablet take 1 tablet by mouth once daily Yes Historical Provider, MD   doxycycline hyclate (VIBRA-TABS) 100 MG tablet Take 1 tablet by mouth 2 times daily for 10 days Yes Janette Escalante DO   methylPREDNISolone (MEDROL DOSEPACK) 4 MG tablet Take by mouth.  Yes Janette Escalante DO   benzonatate (TESSALON) 100 MG capsule Take 1 capsule by mouth 2 times daily as needed for Cough Yes Janette Escalante DO   ondansetron (ZOFRAN) 4 MG tablet Take 1 tablet by mouth daily as needed for Nausea or Vomiting Yes Janette Escalante DO   amphetamine-dextroamphetamine (ADDERALL) 20 MG tablet TAKE ONE TABLET BY MOUTH TWICE A DAY Yes Historical Provider, MD   norethindrone-ethinyl estradiol (JUNEL FE 1/20) 1-20 MG-MCG per tablet take 1 tablet by mouth once daily Yes Janette Escalante DO   VENTOLIN  (90 Base) MCG/ACT inhaler inhale 2 puffs by mouth and INTO THE LUNGS every 4 hours if needed for wheezing Yes Janette Escalante DO   busPIRone (BUSPAR) 10 MG tablet take 1 tablet by mouth twice a day Yes Janette Escalante DO   montelukast (SINGULAIR) 10 MG tablet Take 1 tablet by mouth nightly Yes Ramiro Bustos DO   albuterol (ACCUNEB) 1.25 MG/3ML nebulizer solution INHALE 3 ML (1 VIAL) VIA NEBULIZER EVERY 4 TO 6 HOURS AS NEEDED Yes Janette Escalante DO   SYMBICORT 80-4.5 MCG/ACT AERO Inhale 2 puffs into the lungs in the morning and at bedtime Yes Ramiro Bustos DO   fluticasone (FLONASE) 50 MCG/ACT nasal spray INSTILL TWO SPRAYS INTO EACH NOSTRIL DAILY Yes Janette Escalante DO loratadine (CLARITIN) 10 MG tablet Take 1 tablet by mouth daily Yes Zofia Curran DO   rizatriptan (MAXALT) 5 MG tablet Take one at migraine onset, may repeat once in 2-4 hrs. Yes Norma French MD        Allergies   Allergen Reactions    Bee Venom Swelling    Seasonal      Sneezing, itchy watery eyes, congestion    Sumatriptan Other (See Comments)     Reports \"felt like head was on fire\"       Past Medical History:   Diagnosis Date    Asthma     Chronic fatigue     Class 3 severe obesity due to excess calories with serious comorbidity and body mass index (BMI) of 50.0 to 59.9 in adult [facility])     Elevated liver transaminase level 09/17/2021    Kidney stone     Migraine without aura 09/17/2021    Migraines        History reviewed. No pertinent surgical history. Social History     Socioeconomic History    Marital status: Single     Spouse name: Not on file    Number of children: Not on file    Years of education: Not on file    Highest education level: Not on file   Occupational History    Not on file   Tobacco Use    Smoking status: Never    Smokeless tobacco: Never   Substance and Sexual Activity    Alcohol use: Yes     Comment: occ    Drug use: Never    Sexual activity: Not on file   Other Topics Concern    Not on file   Social History Narrative    Not on file     Social Determinants of Health     Financial Resource Strain: Low Risk     Difficulty of Paying Living Expenses: Not hard at all   Food Insecurity: No Food Insecurity    Worried About Running Out of Food in the Last Year: Never true    [address] in the Last Year: Never true   Transportation Needs: Unknown    Lack of Transportation (Medical): Not on file    Lack of Transportation (Non-Medical):  No   Physical Activity: Not on file   Stress: Not on file   Social Connections: Not on file   Intimate Partner Violence: Not on file   Housing Stability: Unknown    Unable to Pay for Housing in the Last Year: Not on file    Number of Places Lived in the Last Year: Not on file    Unstable Housing in the Last Year: No        Family History   Problem Relation Age of Onset    Migraines Mother            Vitals:    02/23/23 0930   BP: 122/86   Pulse: 96   Resp: 16   Temp: 98 °F (36.7 °C)   TempSrc: Tympanic   SpO2: 97%   Weight: 274 lb (124.3 kg)   Height: 5' 3.5\" (1.613 m)     Estimated body mass index is 47.78 kg/m² as calculated from the following:    Height as of this encounter: 5' 3.5\" (1.613 m). Weight as of this encounter: 274 lb (124.3 kg).     Most Recent Labs  CBC  Lab Results   Component Value Date/Time    WBC 7.2 07/14/2021 02:58 PM    WBC 9.7 04/30/2021 10:09 PM    RBC 5.26 07/14/2021 02:58 PM    RBC 4.91 04/30/2021 10:09 PM    HGB 14.4 07/14/2021 02:58 PM    HGB 13.3 04/30/2021 10:09 PM    HCT 44.6 07/14/2021 02:58 PM    HCT 41.2 04/30/2021 10:09 PM    MCV 84.8 07/14/2021 02:58 PM    MCV 83.9 04/30/2021 10:09 PM     07/14/2021 02:58 PM     04/30/2021 10:09 PM      CMP  Lab Results   Component Value Date/Time     06/10/2022 09:24 AM     07/14/2021 02:58 PM     04/30/2021 10:09 PM    K 4.3 06/10/2022 09:24 AM    K 4.3 07/14/2021 02:58 PM    K 3.9 04/30/2021 10:09 PM     06/10/2022 09:24 AM     07/14/2021 02:58 PM     04/30/2021 10:09 PM    CO2 24 06/10/2022 09:24 AM    CO2 24 07/14/2021 02:58 PM    CO2 25 04/30/2021 10:09 PM    ANIONGAP 10 06/10/2022 09:24 AM    ANIONGAP 14 07/14/2021 02:58 PM    ANIONGAP 10 04/30/2021 10:09 PM    GLUCOSE 113 06/10/2022 09:24 AM    GLUCOSE 96 07/14/2021 02:58 PM    GLUCOSE 153 04/30/2021 10:09 PM    BUN 14 06/10/2022 09:24 AM    BUN 9 07/14/2021 02:58 PM    BUN 11 04/30/2021 10:09 PM    CREATININE 0.9 06/10/2022 09:24 AM    CREATININE 0.8 07/14/2021 02:58 PM    CREATININE 0.8 04/30/2021 10:09 PM    LABGLOM >60 06/10/2022 09:24 AM    LABGLOM >60 07/14/2021 02:58 PM    LABGLOM >60 04/30/2021 10:09 PM    GFRAA >60 06/10/2022 09:24 AM    GFRAA >60 07/14/2021 02:58 PM GFRAA >60 04/30/2021 10:09 PM    CALCIUM 9.6 06/10/2022 09:24 AM    CALCIUM 9.9 07/14/2021 02:58 PM    CALCIUM 9.1 04/30/2021 10:09 PM    PROT 7.9 07/14/2021 02:58 PM    PROT 7.1 04/30/2021 10:09 PM    LABALBU 4.5 07/14/2021 02:58 PM    LABALBU 4.4 04/30/2021 10:09 PM    BILITOT 0.3 07/14/2021 02:58 PM    BILITOT <0.2 04/30/2021 10:09 PM    ALKPHOS 98 07/14/2021 02:58 PM    ALKPHOS 91 04/30/2021 10:09 PM    AST 40 07/14/2021 02:58 PM    AST 28 04/30/2021 10:09 PM    ALT 54 07/14/2021 02:58 PM    ALT 45 04/30/2021 10:09 PM     UA  Lab Results   Component Value Date/Time    COLORU Yellow 07/14/2021 02:58 PM    CLARITYU Clear 07/14/2021 02:58 PM    GLUCOSEU Negative 07/14/2021 02:58 PM    BILIRUBINUR Negative 07/14/2021 02:58 PM    KETUA Negative 07/14/2021 02:58 PM    SPECGRAV 1.025 07/14/2021 02:58 PM    BLOODU Negative 07/14/2021 02:58 PM    PHUR 7.5 07/14/2021 02:58 PM    PROTEINU Negative 07/14/2021 02:58 PM    UROBILINOGEN 0.2 07/14/2021 02:58 PM    NITRU Negative 07/14/2021 02:58 PM    LEUKOCYTESUR SMALL 07/14/2021 02:58 PM       Physical Exam  Vitals and nursing note reviewed. Constitutional:       Appearance: Normal appearance. She is obese. HENT:      Head: Normocephalic and atraumatic. Right Ear: Tympanic membrane, ear canal and external ear normal.      Left Ear: Tympanic membrane, ear canal and external ear normal.      Nose: Congestion and rhinorrhea present. Mouth/Throat:      Mouth: Mucous membranes are moist.      Pharynx: Oropharynx is clear. Posterior oropharyngeal erythema present. Eyes:      Extraocular Movements: Extraocular movements intact. Conjunctiva/sclera: Conjunctivae normal.      Pupils: Pupils are equal, round, and reactive to light. Cardiovascular:      Rate and Rhythm: Normal rate and regular rhythm. Pulses: Normal pulses. Heart sounds: Normal heart sounds. Pulmonary:      Effort: Pulmonary effort is normal.      Breath sounds: Normal breath sounds. Abdominal:      General: Bowel sounds are normal.      Palpations: Abdomen is soft. Musculoskeletal:         General: Normal range of motion. Cervical back: Normal range of motion and neck supple. Skin:     General: Skin is warm and dry. Capillary Refill: Capillary refill takes less than 2 seconds. Neurological:      General: No focal deficit present. Mental Status: She is alert and oriented to person, place, and time. Psychiatric:         Mood and Affect: Mood normal.         Behavior: Behavior normal.         Thought Content: Thought content normal.       ASSESSMENT/PLAN:  Samira was seen today for cough, pharyngitis, generalized body aches, nausea, headache and congestion. Diagnoses and all orders for this visit:    Sore throat  -     POCT rapid strep A    Cough, unspecified type  -     POCT Influenza A/B  -     benzonatate (TESSALON) 100 MG capsule; Take 1 capsule by mouth 2 times daily as needed for Cough    Acute bacterial sinusitis  -     doxycycline hyclate (VIBRA-TABS) 100 MG tablet; Take 1 tablet by mouth 2 times daily for 10 days  -     methylPREDNISolone (MEDROL DOSEPACK) 4 MG tablet; Take by mouth. -     benzonatate (TESSALON) 100 MG capsule; Take 1 capsule by mouth 2 times daily as needed for Cough    Nausea  -     ondansetron (ZOFRAN) 4 MG tablet; Take 1 tablet by mouth daily as needed for Nausea or Vomiting       As above. Call or go to the nearest ED immediately if symptoms worsen or persist.  Educational materials and/or home exercises printed for patient's review and were included in patient instructions on his/her After Visit Summary and given to patient at the end of visit. Counseled regarding above diagnosis, including possible risks and complications,  especially if left uncontrolled.      Counseled regarding the possible side effects, risks, benefits and alternatives to treatment; patient and/or guardian verbalizes understanding, agrees, feels comfortable with and wishes to proceed with above treatment plan. Advised patient to call with any new medication issues, and read all Rx info from pharmacy to assure aware of all possible risks and side effects of medication before taking. Reviewed age and gender appropriate health screening exams and vaccinations. Advised patient regarding importance of keeping up with recommended health maintenance and to schedule as soon as possible if overdue, as this is important in assessing for undiagnosed pathology, especially cancer, as well as protecting against potentially harmful/life threatening disease. Patient and/or guardian verbalizes understanding and agrees with above counseling, assessment and plan. All questions answered. Return if symptoms worsen or fail to improve. An  electronic signature was used to authenticate this note. --Gualberto Roger, DO on 2/23/2023 at 10:11 AM    This document was prepared at least partially through the use of voice recognition software. Although effort is taken to assure the accuracy of this document, it is possible that grammatical, syntax,  or spelling errors may occur.

## 2023-02-23 NOTE — LETTER
[address] [address]  Phone: [phone]  Fax: [phone]    Terra Houston DO        February 23, 2023     Patient: Douglas Bianchi   YOB: 1995   Date of Visit: 2/23/2023       To Whom it May Concern:    Douglas Bianchi was seen in my clinic on 2/23/2023. She may return to work on 2/27/23. If you have any questions or concerns, please don't hesitate to call.     Sincerely,         Janette Escalante DO

## 2023-03-28 ENCOUNTER — OFFICE VISIT (OUTPATIENT)
Dept: OBGYN | Age: 28
End: 2023-03-28
Payer: MEDICAID

## 2023-03-28 VITALS
HEIGHT: 65 IN | DIASTOLIC BLOOD PRESSURE: 82 MMHG | SYSTOLIC BLOOD PRESSURE: 136 MMHG | BODY MASS INDEX: 44.98 KG/M2 | WEIGHT: 270 LBS | HEART RATE: 99 BPM

## 2023-03-28 DIAGNOSIS — Z01.419 ENCOUNTER FOR ANNUAL ROUTINE GYNECOLOGICAL EXAMINATION: Primary | ICD-10-CM

## 2023-03-28 PROCEDURE — 99999 PR OFFICE/OUTPT VISIT,PROCEDURE ONLY: CPT | Performed by: OBSTETRICS & GYNECOLOGY

## 2023-03-28 PROCEDURE — 99203 OFFICE O/P NEW LOW 30 MIN: CPT | Performed by: OBSTETRICS & GYNECOLOGY

## 2023-03-28 SDOH — ECONOMIC STABILITY: FOOD INSECURITY: WITHIN THE PAST 12 MONTHS, THE FOOD YOU BOUGHT JUST DIDN'T LAST AND YOU DIDN'T HAVE MONEY TO GET MORE.: NEVER TRUE

## 2023-03-28 SDOH — ECONOMIC STABILITY: FOOD INSECURITY: WITHIN THE PAST 12 MONTHS, YOU WORRIED THAT YOUR FOOD WOULD RUN OUT BEFORE YOU GOT MONEY TO BUY MORE.: NEVER TRUE

## 2023-03-28 SDOH — ECONOMIC STABILITY: INCOME INSECURITY: HOW HARD IS IT FOR YOU TO PAY FOR THE VERY BASICS LIKE FOOD, HOUSING, MEDICAL CARE, AND HEATING?: NOT HARD AT ALL

## 2023-03-28 ASSESSMENT — PATIENT HEALTH QUESTIONNAIRE - PHQ9
SUM OF ALL RESPONSES TO PHQ QUESTIONS 1-9: 14
2. FEELING DOWN, DEPRESSED OR HOPELESS: 2
SUM OF ALL RESPONSES TO PHQ QUESTIONS 1-9: 14
3. TROUBLE FALLING OR STAYING ASLEEP: 2
1. LITTLE INTEREST OR PLEASURE IN DOING THINGS: 2
6. FEELING BAD ABOUT YOURSELF - OR THAT YOU ARE A FAILURE OR HAVE LET YOURSELF OR YOUR FAMILY DOWN: 1
9. THOUGHTS THAT YOU WOULD BE BETTER OFF DEAD, OR OF HURTING YOURSELF: 0
SUM OF ALL RESPONSES TO PHQ9 QUESTIONS 1 & 2: 4
10. IF YOU CHECKED OFF ANY PROBLEMS, HOW DIFFICULT HAVE THESE PROBLEMS MADE IT FOR YOU TO DO YOUR WORK, TAKE CARE OF THINGS AT HOME, OR GET ALONG WITH OTHER PEOPLE: 1
7. TROUBLE CONCENTRATING ON THINGS, SUCH AS READING THE NEWSPAPER OR WATCHING TELEVISION: 3
SUM OF ALL RESPONSES TO PHQ QUESTIONS 1-9: 14
8. MOVING OR SPEAKING SO SLOWLY THAT OTHER PEOPLE COULD HAVE NOTICED. OR THE OPPOSITE, BEING SO FIGETY OR RESTLESS THAT YOU HAVE BEEN MOVING AROUND A LOT MORE THAN USUAL: 3
4. FEELING TIRED OR HAVING LITTLE ENERGY: 1
5. POOR APPETITE OR OVEREATING: 0
SUM OF ALL RESPONSES TO PHQ QUESTIONS 1-9: 14

## 2023-03-28 NOTE — PROGRESS NOTES
Np/hx of pcos   LMP 3/24/2024. [place] exam out of state last annual was 2-3 years. Pt denies other concerns. Pt has us 2-3 yrs ago and was dx with a fibroid. Pt voiced had 3 periods this month.    Pt not seen      Had to leave I was in OR

## 2023-04-19 ENCOUNTER — PATIENT MESSAGE (OUTPATIENT)
Dept: FAMILY MEDICINE CLINIC | Age: 28
End: 2023-04-19

## 2023-04-19 DIAGNOSIS — G43.009 MIGRAINE WITHOUT AURA AND WITHOUT STATUS MIGRAINOSUS, NOT INTRACTABLE: Chronic | ICD-10-CM

## 2023-04-19 RX ORDER — RIZATRIPTAN BENZOATE 5 MG/1
TABLET ORAL
Qty: 12 TABLET | Refills: 3 | Status: SHIPPED | OUTPATIENT
Start: 2023-04-19

## 2023-04-19 NOTE — TELEPHONE ENCOUNTER
From: Merlin Patel  To: Dr. Amanda Roberto: 4/19/2023 8:50 AM EDT  Subject: Meds    Can you refill the Maxalt

## 2023-05-19 ENCOUNTER — OFFICE VISIT (OUTPATIENT)
Dept: FAMILY MEDICINE CLINIC | Age: 28
End: 2023-05-19
Payer: MEDICAID

## 2023-05-19 VITALS
OXYGEN SATURATION: 98 % | HEIGHT: 65 IN | TEMPERATURE: 97.6 F | HEART RATE: 100 BPM | WEIGHT: 266.2 LBS | SYSTOLIC BLOOD PRESSURE: 134 MMHG | DIASTOLIC BLOOD PRESSURE: 76 MMHG | BODY MASS INDEX: 44.35 KG/M2 | RESPIRATION RATE: 16 BRPM

## 2023-05-19 DIAGNOSIS — J30.2 SEASONAL ALLERGIES: ICD-10-CM

## 2023-05-19 DIAGNOSIS — H04.129 DRY EYE: Primary | ICD-10-CM

## 2023-05-19 PROCEDURE — 99213 OFFICE O/P EST LOW 20 MIN: CPT

## 2023-05-19 PROCEDURE — G8417 CALC BMI ABV UP PARAM F/U: HCPCS

## 2023-05-19 PROCEDURE — G8427 DOCREV CUR MEDS BY ELIG CLIN: HCPCS

## 2023-05-19 PROCEDURE — 1036F TOBACCO NON-USER: CPT

## 2023-05-19 RX ORDER — DEXTROAMPHETAMINE SACCHARATE, AMPHETAMINE ASPARTATE, DEXTROAMPHETAMINE SULFATE AND AMPHETAMINE SULFATE 5; 5; 5; 5 MG/1; MG/1; MG/1; MG/1
1 TABLET ORAL DAILY
COMMUNITY
Start: 2023-05-18

## 2023-05-19 NOTE — PROGRESS NOTES
Chief Complaint   Conjunctivitis (Right eye, dry scratchy, today)  This morning,   History of Present Illness   Source of history provided by:  patient. Wood Cotter is a 28 y.o. old female presenting to the walk in clinic with redness and dryness to the right eye for the past few hours. States there was no obvious FB exposure. Denies a recent URI within the past week,  has had seasonal allergies flaring up. Denies any visual changes, visual loss, HA, ear pain, fever, chills, N/V, or lethargy. Denies contact lens use, spontaneous onset, close contact with similar symptoms, or work related exposure. Denies history of glaucoma or recent eye surgery. ROS    Unless otherwise stated in this report or unable to obtain because of the patient's clinical or mental status as evidenced by the medical record, this patients's positive and negative responses for Review of Systems, constitutional, psych, eyes, ENT, cardiovascular, respiratory, gastrointestinal, neurological, genitourinary, musculoskeletal, integument systems and systems related to the presenting problem are either stated in the preceding or were not pertinent or were negative for the symptoms and/or complaints related to the medical problem. Physical Exam         VS:  /76   Pulse 100   Temp 97.6 °F (36.4 °C) (Temporal)   Resp 16   Ht 5' 5\" (1.651 m)   Wt 266 lb 3.2 oz (120.7 kg)   LMP 05/12/2023   SpO2 98%   BMI 44.30 kg/m²    Oxygen Saturation Interpretation: Normal.    Constitutional:  Alert, development consistent with age. HENT:  NC/NT. Airway patent. Neck supple. Eyes: Normal extraocular motion, pupils were equal round and reactive to light, the patient had no pain with extraocular motion. There was evidence of mild conjunctival injection to the outer lower portion of right eye without discharge. No evidence of preseptal cellulitis or orbital cellulitis. Visual acuity grossly intact.      Lungs: CTAB without wheezing, rales,

## 2023-05-24 SDOH — ECONOMIC STABILITY: FOOD INSECURITY: WITHIN THE PAST 12 MONTHS, THE FOOD YOU BOUGHT JUST DIDN'T LAST AND YOU DIDN'T HAVE MONEY TO GET MORE.: PATIENT DECLINED

## 2023-05-24 SDOH — ECONOMIC STABILITY: TRANSPORTATION INSECURITY
IN THE PAST 12 MONTHS, HAS LACK OF TRANSPORTATION KEPT YOU FROM MEETINGS, WORK, OR FROM GETTING THINGS NEEDED FOR DAILY LIVING?: NO

## 2023-05-24 SDOH — ECONOMIC STABILITY: FOOD INSECURITY: WITHIN THE PAST 12 MONTHS, YOU WORRIED THAT YOUR FOOD WOULD RUN OUT BEFORE YOU GOT MONEY TO BUY MORE.: NEVER TRUE

## 2023-05-24 SDOH — ECONOMIC STABILITY: INCOME INSECURITY: HOW HARD IS IT FOR YOU TO PAY FOR THE VERY BASICS LIKE FOOD, HOUSING, MEDICAL CARE, AND HEATING?: SOMEWHAT HARD

## 2023-05-25 ENCOUNTER — OFFICE VISIT (OUTPATIENT)
Dept: FAMILY MEDICINE CLINIC | Age: 28
End: 2023-05-25

## 2023-05-25 VITALS
DIASTOLIC BLOOD PRESSURE: 70 MMHG | HEART RATE: 114 BPM | BODY MASS INDEX: 44.48 KG/M2 | WEIGHT: 267 LBS | RESPIRATION RATE: 18 BRPM | SYSTOLIC BLOOD PRESSURE: 108 MMHG | HEIGHT: 65 IN | TEMPERATURE: 97.8 F | OXYGEN SATURATION: 98 %

## 2023-05-25 DIAGNOSIS — G89.29 CHRONIC PAIN OF BOTH SHOULDERS: ICD-10-CM

## 2023-05-25 DIAGNOSIS — G89.29 CHRONIC MIDLINE THORACIC BACK PAIN: ICD-10-CM

## 2023-05-25 DIAGNOSIS — M25.511 CHRONIC PAIN OF BOTH SHOULDERS: ICD-10-CM

## 2023-05-25 DIAGNOSIS — M54.2 NECK PAIN: Primary | ICD-10-CM

## 2023-05-25 DIAGNOSIS — J45.30 MILD PERSISTENT ASTHMA, UNSPECIFIED WHETHER COMPLICATED: ICD-10-CM

## 2023-05-25 DIAGNOSIS — B07.9 WART ON THUMB: ICD-10-CM

## 2023-05-25 DIAGNOSIS — M54.6 CHRONIC MIDLINE THORACIC BACK PAIN: ICD-10-CM

## 2023-05-25 DIAGNOSIS — M25.512 CHRONIC PAIN OF BOTH SHOULDERS: ICD-10-CM

## 2023-05-25 RX ORDER — DILTIAZEM HYDROCHLORIDE 60 MG/1
2 TABLET, FILM COATED ORAL 2 TIMES DAILY
Qty: 10.2 G | Refills: 3 | Status: SHIPPED | OUTPATIENT
Start: 2023-05-25

## 2023-05-25 RX ORDER — TRIAMCINOLONE ACETONIDE 40 MG/ML
40 INJECTION, SUSPENSION INTRA-ARTICULAR; INTRAMUSCULAR ONCE
Status: COMPLETED | OUTPATIENT
Start: 2023-05-25 | End: 2023-05-25

## 2023-05-25 RX ORDER — KETOROLAC TROMETHAMINE 30 MG/ML
30 INJECTION, SOLUTION INTRAMUSCULAR; INTRAVENOUS ONCE
Status: COMPLETED | OUTPATIENT
Start: 2023-05-25 | End: 2023-05-25

## 2023-05-25 RX ORDER — METHOCARBAMOL 750 MG/1
750 TABLET, FILM COATED ORAL 4 TIMES DAILY PRN
Qty: 20 TABLET | Refills: 0 | Status: SHIPPED | OUTPATIENT
Start: 2023-05-25

## 2023-05-25 RX ADMIN — TRIAMCINOLONE ACETONIDE 40 MG: 40 INJECTION, SUSPENSION INTRA-ARTICULAR; INTRAMUSCULAR at 09:06

## 2023-05-25 RX ADMIN — KETOROLAC TROMETHAMINE 30 MG: 30 INJECTION, SOLUTION INTRAMUSCULAR; INTRAVENOUS at 09:05

## 2023-05-25 ASSESSMENT — ENCOUNTER SYMPTOMS
ABDOMINAL PAIN: 0
DIARRHEA: 0
BACK PAIN: 0
SINUS PRESSURE: 0
CONSTIPATION: 0
EYE PAIN: 0
VOMITING: 0
EYE REDNESS: 0
NAUSEA: 0
SORE THROAT: 0
RHINORRHEA: 0
SHORTNESS OF BREATH: 0
SINUS PAIN: 0
COUGH: 0

## 2023-05-25 NOTE — PROGRESS NOTES
5/25/2023    HPI  Chief Complaint   Patient presents with    Shoulder Pain    Neck Pain    Verruca Vulgaris       Aissatou Vargas is a 28 y.o. female who  has a past medical history of Asthma, Chronic fatigue, Class 3 severe obesity due to excess calories with serious comorbidity and body mass index (BMI) of 50.0 to 59.9 in adult [facility]), Elevated liver transaminase level, Kidney stone, Migraine without aura, Migraines, and PCOS (polycystic ovarian syndrome). Shoulder Pain  Patient complains of both sides shoulder pain. The symptoms began several months ago Symptoms have gradually worsened. Pain is described as repetitive movements and overhead movements. Symptoms were incited by no known event. Patient denies alcohol overuse, fever, hematemesis, and melena. Neck Pain  Patient complains of neck pain. Event that precipitated these symptoms: none known. Onset of symptoms several months ago, and have been gradually worsening since that time. Current symptoms are  pain in the neck and the shoulders . Patient has had no prior neck problems. Previous treatments include:  tens unit and a massage gun and stretching . It is popping and cracking in the neck just even with the slightest movement. She states that she has a job now where she has been sitting nonstop. She is barely able to get up for break when she can she does get up to move if she can. When she is not working on the weekends and she is more active she states that she barely notices any of the pain. She has been going to the [facility] as well and has been using the hot tub and things like that and swimming and trying exercise which does help slightly but not that much. S: The patient complains of warts on the fingers, present for a few month(s or so. She has tried using OTC topical medication with some relief on other fingers but not on the left thumb. She has been picking at it as well.      Review of Systems   Constitutional:

## 2023-06-11 DIAGNOSIS — M25.512 CHRONIC PAIN OF BOTH SHOULDERS: ICD-10-CM

## 2023-06-11 DIAGNOSIS — M54.2 NECK PAIN: ICD-10-CM

## 2023-06-11 DIAGNOSIS — M25.511 CHRONIC PAIN OF BOTH SHOULDERS: ICD-10-CM

## 2023-06-11 DIAGNOSIS — G89.29 CHRONIC MIDLINE THORACIC BACK PAIN: ICD-10-CM

## 2023-06-11 DIAGNOSIS — M54.6 CHRONIC MIDLINE THORACIC BACK PAIN: ICD-10-CM

## 2023-06-11 DIAGNOSIS — G89.29 CHRONIC PAIN OF BOTH SHOULDERS: ICD-10-CM

## 2023-06-11 DIAGNOSIS — G43.009 MIGRAINE WITHOUT AURA AND WITHOUT STATUS MIGRAINOSUS, NOT INTRACTABLE: Chronic | ICD-10-CM

## 2023-06-12 RX ORDER — RIZATRIPTAN BENZOATE 5 MG/1
TABLET ORAL
Qty: 12 TABLET | Refills: 3 | Status: SHIPPED | OUTPATIENT
Start: 2023-06-12

## 2023-06-12 RX ORDER — METHOCARBAMOL 750 MG/1
750 TABLET, FILM COATED ORAL 4 TIMES DAILY PRN
Qty: 60 TABLET | Refills: 2 | Status: SHIPPED
Start: 2023-06-12 | End: 2023-08-10

## 2023-06-22 DIAGNOSIS — J30.9 ALLERGIC RHINITIS, UNSPECIFIED SEASONALITY, UNSPECIFIED TRIGGER: ICD-10-CM

## 2023-06-22 DIAGNOSIS — J45.909 UNCOMPLICATED ASTHMA, UNSPECIFIED ASTHMA SEVERITY, UNSPECIFIED WHETHER PERSISTENT: ICD-10-CM

## 2023-06-22 DIAGNOSIS — E28.2 PCOS (POLYCYSTIC OVARIAN SYNDROME): ICD-10-CM

## 2023-06-22 RX ORDER — NORETHINDRONE ACETATE AND ETHINYL ESTRADIOL 1MG-20(21)
1 KIT ORAL DAILY
Qty: 28 TABLET | Refills: 5 | Status: SHIPPED | OUTPATIENT
Start: 2023-06-22

## 2023-06-22 RX ORDER — FLUTICASONE PROPIONATE 50 MCG
SPRAY, SUSPENSION (ML) NASAL
Qty: 48 G | Refills: 3 | Status: SHIPPED | OUTPATIENT
Start: 2023-06-22

## 2023-07-31 ENCOUNTER — HOSPITAL ENCOUNTER (OUTPATIENT)
Age: 28
Discharge: HOME | End: 2023-07-31
Payer: MEDICAID

## 2023-07-31 DIAGNOSIS — G47.33 OSA (OBSTRUCTIVE SLEEP APNEA): Primary | ICD-10-CM

## 2023-07-31 LAB
BASOPHILS # BLD: 0.03 K/UL (ref 0–0.2)
BASOPHILS NFR BLD: 0 % (ref 0–2)
EOSINOPHIL # BLD: 0.1 K/UL (ref 0.05–0.5)
EOSINOPHILS RELATIVE PERCENT: 1 % (ref 0–6)
ERYTHROCYTE [DISTWIDTH] IN BLOOD BY AUTOMATED COUNT: 13.4 % (ref 11.5–15)
HCT VFR BLD AUTO: 41.8 % (ref 34–48)
HGB BLD-MCNC: 14.2 G/DL (ref 11.5–15.5)
IMM GRANULOCYTES # BLD AUTO: 0.03 K/UL (ref 0–0.58)
IMM GRANULOCYTES NFR BLD: 0 % (ref 0–5)
LYMPHOCYTES NFR BLD: 2.47 K/UL (ref 1.5–4)
LYMPHOCYTES RELATIVE PERCENT: 29 % (ref 20–42)
MCH RBC QN AUTO: 28.2 PG (ref 26–35)
MCHC RBC AUTO-ENTMCNC: 34 G/DL (ref 32–34.5)
MCV RBC AUTO: 83.1 FL (ref 80–99.9)
MONOCYTES NFR BLD: 0.47 K/UL (ref 0.1–0.95)
MONOCYTES NFR BLD: 6 % (ref 2–12)
NEUTROPHILS NFR BLD: 64 % (ref 43–80)
NEUTS SEG NFR BLD: 5.44 K/UL (ref 1.8–7.3)
PARTIAL THROMBOPLASTIN TIME: 31.4 SEC (ref 24.5–35.1)
PLATELET # BLD AUTO: 435 K/UL (ref 130–450)
PMV BLD AUTO: 9.9 FL (ref 7–12)
RBC # BLD AUTO: 5.03 M/UL (ref 3.5–5.5)
WBC OTHER # BLD: 8.5 K/UL (ref 4.5–11.5)

## 2023-07-31 PROCEDURE — 85384 FIBRINOGEN ACTIVITY: CPT

## 2023-07-31 PROCEDURE — 85270 CLOT FACTOR XI PTA: CPT

## 2023-07-31 PROCEDURE — 85240 CLOT FACTOR VIII AHG 1 STAGE: CPT

## 2023-07-31 PROCEDURE — 36415 COLL VENOUS BLD VENIPUNCTURE: CPT

## 2023-07-31 PROCEDURE — 85027 COMPLETE CBC AUTOMATED: CPT

## 2023-07-31 PROCEDURE — 85260 CLOT FACTOR X STUART-POWER: CPT

## 2023-07-31 PROCEDURE — 85730 THROMBOPLASTIN TIME PARTIAL: CPT

## 2023-07-31 PROCEDURE — 85300 ANTITHROMBIN III ACTIVITY: CPT

## 2023-07-31 PROCEDURE — 85250 CLOT FACTOR IX PTC/CHRSTMAS: CPT

## 2023-07-31 PROCEDURE — 85230 CLOT FACTOR VII PROCONVERTIN: CPT

## 2023-07-31 PROCEDURE — 81241 F5 GENE: CPT

## 2023-08-01 LAB
AT III ACT/NOR PPP CHRO: 103 % (ref 83–121)
FIBRINOGEN PPP-MCNC: 497 MG/DL (ref 200–400)

## 2023-08-04 LAB
F5 P.R506Q BLD/T QL: NEGATIVE
SPECIMEN SOURCE: NORMAL

## 2023-08-07 DIAGNOSIS — D68.8: Primary | ICD-10-CM

## 2023-08-07 LAB
FACTOR IX ACTIVITY: >150 % (ref 65–150)
FACTOR VII ACTIVITY: >150 % (ref 50–150)
FACTOR VIII ACTIVITY: 122 % (ref 50–150)

## 2023-08-07 RX ORDER — MONTELUKAST SODIUM 10 MG/1
10 TABLET ORAL NIGHTLY
Qty: 90 TABLET | Refills: 3 | Status: SHIPPED | OUTPATIENT
Start: 2023-08-07

## 2023-08-10 DIAGNOSIS — M25.511 CHRONIC PAIN OF BOTH SHOULDERS: ICD-10-CM

## 2023-08-10 DIAGNOSIS — G89.29 CHRONIC MIDLINE THORACIC BACK PAIN: ICD-10-CM

## 2023-08-10 DIAGNOSIS — M54.6 CHRONIC MIDLINE THORACIC BACK PAIN: ICD-10-CM

## 2023-08-10 DIAGNOSIS — M25.512 CHRONIC PAIN OF BOTH SHOULDERS: ICD-10-CM

## 2023-08-10 DIAGNOSIS — G89.29 CHRONIC PAIN OF BOTH SHOULDERS: ICD-10-CM

## 2023-08-10 DIAGNOSIS — M54.2 NECK PAIN: ICD-10-CM

## 2023-08-10 LAB
FACTOR X ACTIVITY: 159 % (ref 50–150)
FACTOR XI ACTIVITY: 164 % (ref 50–150)

## 2023-08-10 RX ORDER — METHOCARBAMOL 750 MG/1
750 TABLET, FILM COATED ORAL 4 TIMES DAILY PRN
Qty: 120 TABLET | Refills: 0 | Status: SHIPPED | OUTPATIENT
Start: 2023-08-10

## 2023-08-31 ENCOUNTER — OFFICE VISIT (OUTPATIENT)
Dept: FAMILY MEDICINE CLINIC | Age: 28
End: 2023-08-31
Payer: MEDICAID

## 2023-08-31 VITALS
BODY MASS INDEX: 41.65 KG/M2 | HEART RATE: 112 BPM | WEIGHT: 250 LBS | DIASTOLIC BLOOD PRESSURE: 74 MMHG | TEMPERATURE: 98.2 F | HEIGHT: 65 IN | SYSTOLIC BLOOD PRESSURE: 118 MMHG | OXYGEN SATURATION: 98 % | RESPIRATION RATE: 18 BRPM

## 2023-08-31 DIAGNOSIS — M25.511 CHRONIC PAIN OF BOTH SHOULDERS: ICD-10-CM

## 2023-08-31 DIAGNOSIS — G89.29 CHRONIC BILATERAL THORACIC BACK PAIN: Primary | ICD-10-CM

## 2023-08-31 DIAGNOSIS — M25.512 CHRONIC PAIN OF BOTH SHOULDERS: ICD-10-CM

## 2023-08-31 DIAGNOSIS — J45.30 MILD PERSISTENT ASTHMA, UNSPECIFIED WHETHER COMPLICATED: ICD-10-CM

## 2023-08-31 DIAGNOSIS — M54.6 CHRONIC BILATERAL THORACIC BACK PAIN: Primary | ICD-10-CM

## 2023-08-31 DIAGNOSIS — M54.6 CHRONIC MIDLINE THORACIC BACK PAIN: ICD-10-CM

## 2023-08-31 DIAGNOSIS — M54.2 NECK PAIN: ICD-10-CM

## 2023-08-31 DIAGNOSIS — G89.29 CHRONIC PAIN OF BOTH SHOULDERS: ICD-10-CM

## 2023-08-31 DIAGNOSIS — J45.909 UNCOMPLICATED ASTHMA, UNSPECIFIED ASTHMA SEVERITY, UNSPECIFIED WHETHER PERSISTENT: ICD-10-CM

## 2023-08-31 DIAGNOSIS — G89.29 CHRONIC MIDLINE THORACIC BACK PAIN: ICD-10-CM

## 2023-08-31 PROCEDURE — 96372 THER/PROPH/DIAG INJ SC/IM: CPT | Performed by: STUDENT IN AN ORGANIZED HEALTH CARE EDUCATION/TRAINING PROGRAM

## 2023-08-31 PROCEDURE — 99214 OFFICE O/P EST MOD 30 MIN: CPT | Performed by: STUDENT IN AN ORGANIZED HEALTH CARE EDUCATION/TRAINING PROGRAM

## 2023-08-31 PROCEDURE — 1036F TOBACCO NON-USER: CPT | Performed by: STUDENT IN AN ORGANIZED HEALTH CARE EDUCATION/TRAINING PROGRAM

## 2023-08-31 PROCEDURE — G8417 CALC BMI ABV UP PARAM F/U: HCPCS | Performed by: STUDENT IN AN ORGANIZED HEALTH CARE EDUCATION/TRAINING PROGRAM

## 2023-08-31 PROCEDURE — G8427 DOCREV CUR MEDS BY ELIG CLIN: HCPCS | Performed by: STUDENT IN AN ORGANIZED HEALTH CARE EDUCATION/TRAINING PROGRAM

## 2023-08-31 RX ORDER — SPIRONOLACTONE 25 MG/1
25 TABLET ORAL 2 TIMES DAILY
COMMUNITY
Start: 2023-06-23

## 2023-08-31 RX ORDER — KETOROLAC TROMETHAMINE 30 MG/ML
30 INJECTION, SOLUTION INTRAMUSCULAR; INTRAVENOUS ONCE
Status: COMPLETED | OUTPATIENT
Start: 2023-08-31 | End: 2023-08-31

## 2023-08-31 RX ORDER — DROSPIRENONE AND ETHINYL ESTRADIOL 0.02-3(28)
KIT ORAL
COMMUNITY
Start: 2023-06-23

## 2023-08-31 RX ORDER — ALBUTEROL SULFATE 1.25 MG/3ML
SOLUTION RESPIRATORY (INHALATION)
Qty: 360 ML | Refills: 3 | Status: SHIPPED | OUTPATIENT
Start: 2023-08-31

## 2023-08-31 RX ORDER — ALBUTEROL SULFATE 90 UG/1
AEROSOL, METERED RESPIRATORY (INHALATION)
Qty: 18 G | Refills: 5 | Status: SHIPPED | OUTPATIENT
Start: 2023-08-31

## 2023-08-31 RX ORDER — METHOCARBAMOL 750 MG/1
750 TABLET, FILM COATED ORAL 4 TIMES DAILY PRN
Qty: 120 TABLET | Refills: 0 | Status: SHIPPED | OUTPATIENT
Start: 2023-08-31

## 2023-08-31 RX ORDER — TRIAMCINOLONE ACETONIDE 40 MG/ML
40 INJECTION, SUSPENSION INTRA-ARTICULAR; INTRAMUSCULAR ONCE
Status: COMPLETED | OUTPATIENT
Start: 2023-08-31 | End: 2023-08-31

## 2023-08-31 RX ORDER — DILTIAZEM HYDROCHLORIDE 60 MG/1
2 TABLET, FILM COATED ORAL 2 TIMES DAILY
Qty: 10.2 G | Refills: 3 | Status: SHIPPED | OUTPATIENT
Start: 2023-08-31

## 2023-08-31 RX ORDER — PAROXETINE HYDROCHLORIDE 20 MG/1
TABLET, FILM COATED ORAL
COMMUNITY
Start: 2023-08-01

## 2023-08-31 RX ADMIN — TRIAMCINOLONE ACETONIDE 40 MG: 40 INJECTION, SUSPENSION INTRA-ARTICULAR; INTRAMUSCULAR at 09:20

## 2023-08-31 RX ADMIN — KETOROLAC TROMETHAMINE 30 MG: 30 INJECTION, SOLUTION INTRAMUSCULAR; INTRAVENOUS at 09:19

## 2023-08-31 ASSESSMENT — ENCOUNTER SYMPTOMS
EYE PAIN: 0
EYE REDNESS: 0
DIARRHEA: 0
NAUSEA: 0
COUGH: 0
SINUS PAIN: 0
RHINORRHEA: 0
ABDOMINAL PAIN: 0
SORE THROAT: 0
CONSTIPATION: 0
SINUS PRESSURE: 0
VOMITING: 0
SHORTNESS OF BREATH: 0
BACK PAIN: 0

## 2023-08-31 ASSESSMENT — PATIENT HEALTH QUESTIONNAIRE - PHQ9
SUM OF ALL RESPONSES TO PHQ QUESTIONS 1-9: 0
9. THOUGHTS THAT YOU WOULD BE BETTER OFF DEAD, OR OF HURTING YOURSELF: 0
1. LITTLE INTEREST OR PLEASURE IN DOING THINGS: 0
6. FEELING BAD ABOUT YOURSELF - OR THAT YOU ARE A FAILURE OR HAVE LET YOURSELF OR YOUR FAMILY DOWN: 0
10. IF YOU CHECKED OFF ANY PROBLEMS, HOW DIFFICULT HAVE THESE PROBLEMS MADE IT FOR YOU TO DO YOUR WORK, TAKE CARE OF THINGS AT HOME, OR GET ALONG WITH OTHER PEOPLE: 0
3. TROUBLE FALLING OR STAYING ASLEEP: 0
SUM OF ALL RESPONSES TO PHQ9 QUESTIONS 1 & 2: 0
2. FEELING DOWN, DEPRESSED OR HOPELESS: 0
SUM OF ALL RESPONSES TO PHQ QUESTIONS 1-9: 0
SUM OF ALL RESPONSES TO PHQ QUESTIONS 1-9: 0
5. POOR APPETITE OR OVEREATING: 0
8. MOVING OR SPEAKING SO SLOWLY THAT OTHER PEOPLE COULD HAVE NOTICED. OR THE OPPOSITE, BEING SO FIGETY OR RESTLESS THAT YOU HAVE BEEN MOVING AROUND A LOT MORE THAN USUAL: 0
4. FEELING TIRED OR HAVING LITTLE ENERGY: 0
SUM OF ALL RESPONSES TO PHQ QUESTIONS 1-9: 0
7. TROUBLE CONCENTRATING ON THINGS, SUCH AS READING THE NEWSPAPER OR WATCHING TELEVISION: 0

## 2023-08-31 NOTE — PROGRESS NOTES
Reports \"felt like head was on fire\"       Past Medical History:   Diagnosis Date    Asthma     Chronic fatigue     Class 3 severe obesity due to excess calories with serious comorbidity and body mass index (BMI) of 50.0 to 59.9 in adult [facility])     Elevated liver transaminase level 09/17/2021    Kidney stone     Migraine without aura 09/17/2021    Migraines     PCOS (polycystic ovarian syndrome)        History reviewed. No pertinent surgical history. Social History     Socioeconomic History    Marital status: Single     Spouse name: Not on file    Number of children: Not on file    Years of education: Not on file    Highest education level: Not on file   Occupational History    Not on file   Tobacco Use    Smoking status: Never     Passive exposure: Past    Smokeless tobacco: Never   Vaping Use    Vaping Use: Never used   Substance and Sexual Activity    Alcohol use: Yes     Comment: occ    Drug use: Never    Sexual activity: Yes     Partners: Female   Other Topics Concern    Not on file   Social History Narrative    Not on file     Social Determinants of Health     Financial Resource Strain: Medium Risk    Difficulty of Paying Living Expenses: Somewhat hard   Food Insecurity: Unknown    Worried About Running Out of Food in the Last Year: Never true    Ran Out of Food in the Last Year: Patient refused   Transportation Needs: Unknown    Lack of Transportation (Medical): Not on file    Lack of Transportation (Non-Medical):  No   Physical Activity: Not on file   Stress: Not on file   Social Connections: Not on file   Intimate Partner Violence: Not on file   Housing Stability: Unknown    Unable to Pay for Housing in the Last Year: Not on file    Number of Places Lived in the Last Year: Not on file    Unstable Housing in the Last Year: No        Family History   Problem Relation Age of Onset    Migraines Mother     Migraines Father     Kidney Disease Sister     Heart Attack Maternal Grandfather            Vitals:

## 2023-09-05 ENCOUNTER — PATIENT MESSAGE (OUTPATIENT)
Dept: FAMILY MEDICINE CLINIC | Age: 28
End: 2023-09-05

## 2023-09-15 ENCOUNTER — HOSPITAL ENCOUNTER (OUTPATIENT)
Dept: INFUSION THERAPY | Age: 28
Discharge: HOME | End: 2023-09-15
Payer: MEDICAID

## 2023-09-15 ENCOUNTER — OFFICE VISIT (OUTPATIENT)
Dept: ONCOLOGY | Age: 28
End: 2023-09-15
Payer: MEDICAID

## 2023-09-15 VITALS
HEART RATE: 114 BPM | SYSTOLIC BLOOD PRESSURE: 131 MMHG | HEIGHT: 65 IN | DIASTOLIC BLOOD PRESSURE: 75 MMHG | TEMPERATURE: 98.4 F | BODY MASS INDEX: 42.22 KG/M2 | WEIGHT: 253.4 LBS | OXYGEN SATURATION: 98 %

## 2023-09-15 DIAGNOSIS — R58 BLEEDING: ICD-10-CM

## 2023-09-15 DIAGNOSIS — R58 BLEEDING: Primary | ICD-10-CM

## 2023-09-15 LAB
INR PPP: 1
PATH REV BLD -IMP: NORMAL
PROTHROMBIN TIME: 11.1 SEC (ref 9.3–12.4)

## 2023-09-15 PROCEDURE — 85246 CLOT FACTOR VIII VW ANTIGEN: CPT

## 2023-09-15 PROCEDURE — G8427 DOCREV CUR MEDS BY ELIG CLIN: HCPCS | Performed by: INTERNAL MEDICINE

## 2023-09-15 PROCEDURE — 36415 COLL VENOUS BLD VENIPUNCTURE: CPT

## 2023-09-15 PROCEDURE — 85610 PROTHROMBIN TIME: CPT

## 2023-09-15 PROCEDURE — 99205 OFFICE O/P NEW HI 60 MIN: CPT | Performed by: INTERNAL MEDICINE

## 2023-09-15 PROCEDURE — 1036F TOBACCO NON-USER: CPT | Performed by: INTERNAL MEDICINE

## 2023-09-15 PROCEDURE — 99214 OFFICE O/P EST MOD 30 MIN: CPT

## 2023-09-15 PROCEDURE — G8417 CALC BMI ABV UP PARAM F/U: HCPCS | Performed by: INTERNAL MEDICINE

## 2023-09-15 PROCEDURE — 85245 CLOT FACTOR VIII VW RISTOCTN: CPT

## 2023-09-15 NOTE — PROGRESS NOTES
[address] [facility] ONCOLOGY  [address] [facility] [address]  Dept: [address]: [phone]  Attending Consult Note      Reason for Visit:   Increased activity of the coagulation factors. Referring Physician:  Ross Witt DO    PCP:  Ross Witt DO    History of Present Illness:     Ms. Ace Lubin is a pleasant 71-year-old female patient, with a past medical history significant for PCOS, migraine headaches, and asthma, who was referred to the hematology office due to abnormal levels of the coagulation factors. The patient had prolonged bleeding after shaving her legs for about 4 hours, no family history of bleeding disorders, her mom had blood clots, also had anemia requiring blood transfusions. The patient had a work-up done, factor X activity is 159%, factor XI activity was 164%, factor VII activity greater than 1 5 0%, factor VIII 122%, factor IX greater than 1 5 0%, Antithrombin III is 103%, factor V Leiden mutation is negative. Review of Systems;  CONSTITUTIONAL: No fever, chills. Good appetite, positive for fatigue. ENMT: Eyes: No diplopia; Nose: No epistaxis. Mouth: No sore throat. RESPIRATORY: No hemoptysis, shortness of breath, cough. CARDIOVASCULAR: No chest pain, palpitations. GASTROINTESTINAL: No nausea/vomiting, abdominal pain, diarrhea/constipation. GENITOURINARY: No dysuria, urinary frequency, hematuria. NEURO: No syncope, presyncope, pos for headache.   Remainder:  ROS NEGATIVE    Past Medical History:      Diagnosis Date    Asthma     Chronic fatigue     Class 3 severe obesity due to excess calories with serious comorbidity and body mass index (BMI) of 50.0 to 59.9 in adult [facility])     Elevated liver transaminase level 09/17/2021    Kidney stone     Migraine without aura 09/17/2021    Migraines     PCOS (polycystic ovarian syndrome)      Patient Active Problem List   Diagnosis    CHRISTA (obstructive sleep apnea)    Elevated
Patient did stop at check out window, ok'd to leave without AVS.  Patient has [address] [facility].
on file     Lack of Transportation (Non-Medical): No   Physical Activity: Not on file   Stress: Not on file   Social Connections: Not on file   Intimate Partner Violence: Not on file   Housing Stability: Unknown (5/24/2023)    Housing Stability Vital Sign     Unable to Pay for Housing in the Last Year: Not on file     Number of Places Lived in the Last Year: Not on file     Unstable Housing in the Last Year: No           Occupation: client services  Retired:  NO          REVIEW OF SYSTEMS:      Pacemaker/Defibulator/ICD:  No    Mediport: No           FALLS RISK SCREENING ASSESSMENT    Instructions:  Assess the patient and [place] the appropriate indicators that are present for fall risk identification. Total the numbers circled and assign a fall risk score from Table 2.  Reassess patient at a minimum every 12 weeks or with status change. Assessment   Date  9/15/2023     1. Mental Ability: confusion/cognitively impaired No - 0       2. Elimination Issues: incontinence, frequency No - 0       3. Ambulatory: use of assistive devices (walker, cane, off-loading devices), attached to equipment (IV pole, oxygen) No - 0     4. Sensory Limitations: dizziness, vertigo, impaired vision Yes - 3       5. Age Less than 65 years - 0       6. Medication: diuretics, strong analgesics, hypnotics, sedatives, antihypertensive agents   Yes - 3   7. Falls:  recent history of falls within the last 3 months (not to include slipping or tripping)   No - 0   TOTAL 6    If score of 4 or greater was education given? Yes       TABLE 2   Risk Score Risk Level Plan of Care   0-3 Little or  No Risk 1. Provide assistance as indicated for ambulation activities  2. Reorient confused/cognitively impaired patient  3. Call-light/bell within patient's reach  4. Chair/bed in low position, stretcher/bed with siderails up except when performing patient care activities  5.   Educate patient/family/caregiver on falls prevention  6.  Reassess in

## 2023-09-16 DIAGNOSIS — M54.6 CHRONIC BILATERAL THORACIC BACK PAIN: ICD-10-CM

## 2023-09-16 DIAGNOSIS — M54.6 CHRONIC MIDLINE THORACIC BACK PAIN: ICD-10-CM

## 2023-09-16 DIAGNOSIS — G89.29 CHRONIC PAIN OF BOTH SHOULDERS: ICD-10-CM

## 2023-09-16 DIAGNOSIS — G89.29 CHRONIC MIDLINE THORACIC BACK PAIN: ICD-10-CM

## 2023-09-16 DIAGNOSIS — M25.512 CHRONIC PAIN OF BOTH SHOULDERS: ICD-10-CM

## 2023-09-16 DIAGNOSIS — M25.511 CHRONIC PAIN OF BOTH SHOULDERS: ICD-10-CM

## 2023-09-16 DIAGNOSIS — G89.29 CHRONIC BILATERAL THORACIC BACK PAIN: ICD-10-CM

## 2023-09-16 DIAGNOSIS — M54.2 NECK PAIN: Primary | ICD-10-CM

## 2023-09-16 RX ORDER — CELECOXIB 200 MG/1
200 CAPSULE ORAL 2 TIMES DAILY
Qty: 180 CAPSULE | Refills: 1 | Status: SHIPPED | OUTPATIENT
Start: 2023-09-16

## 2023-09-17 LAB
VWF AG ACT/NOR PPP IA: 74 % (ref 52–214)
VWF:RCO ACT/NOR PPP PL AGG: 54 % (ref 51–215)

## 2023-09-25 ENCOUNTER — OFFICE VISIT (OUTPATIENT)
Dept: CHIROPRACTIC MEDICINE | Age: 28
End: 2023-09-25

## 2023-09-25 VITALS
OXYGEN SATURATION: 97 % | SYSTOLIC BLOOD PRESSURE: 122 MMHG | WEIGHT: 253 LBS | BODY MASS INDEX: 42.1 KG/M2 | TEMPERATURE: 97.6 F | HEART RATE: 67 BPM | DIASTOLIC BLOOD PRESSURE: 78 MMHG

## 2023-09-25 DIAGNOSIS — M54.04 PANNICULITIS AFFECTING REGIONS OF NECK AND BACK, THORACIC REGION: ICD-10-CM

## 2023-09-25 DIAGNOSIS — M99.02 SEGMENTAL AND SOMATIC DYSFUNCTION OF THORACIC REGION: ICD-10-CM

## 2023-09-25 DIAGNOSIS — M62.830 MUSCLE SPASM OF BACK: ICD-10-CM

## 2023-09-25 DIAGNOSIS — M99.01 SEGMENTAL AND SOMATIC DYSFUNCTION OF CERVICAL REGION: Primary | ICD-10-CM

## 2023-09-25 DIAGNOSIS — M54.2 CERVICALGIA: ICD-10-CM

## 2023-09-25 ASSESSMENT — ENCOUNTER SYMPTOMS
COUGH: 0
SHORTNESS OF BREATH: 0

## 2023-09-25 NOTE — PROGRESS NOTES
Patricia Ochoa CHIRO    [address] [place] : 1995 Sex: female  Age: 33 y.o. Patient was referred by Clifton Crawford DO    Chief Complaint   Patient presents with    Neck Pain    Back Pain       Neck and upper back pain  Prior chiro care -- [facility] about a month ago        Neck Pain   The current episode started more than 1 month ago. The pain is associated with nothing. The pain is present in the midline, right side and left side (into thoracic are). The quality of the pain is described as aching (stiff, tight, can be more severe). The pain is mild. The symptoms are aggravated by position and twisting (sitting, computer/desk work). Stiffness is present In the morning. Pertinent negatives include no fever. She has tried chiropractic manipulation, NSAIDs, heat, ice, home exercises and acetaminophen (TENS) for the symptoms. The treatment provided mild relief. Red Flags:  none    Review of Systems   Constitutional:  Negative for chills and fever. Respiratory:  Negative for cough and shortness of breath. Musculoskeletal:  Positive for myalgias, neck pain and neck stiffness.          Current Outpatient Medications:     celecoxib (CELEBREX) 200 MG capsule, Take 1 capsule by mouth 2 times daily, Disp: 180 capsule, Rfl: 1    drospirenone-ethinyl estradiol (KELLIE) 3-0.02 MG per tablet, take 1 tablet by mouth once daily TAKE CONTINUOUSLY SKIP PLACEBO WEEKS, Disp: , Rfl:     spironolactone (ALDACTONE) 25 MG tablet, Take 1 tablet by mouth 2 times daily, Disp: , Rfl:     PARoxetine (PAXIL) 20 MG tablet, take 1 tablet by mouth once daily for depression, Disp: , Rfl:     albuterol (ACCUNEB) 1.25 MG/3ML nebulizer solution, INHALE 3 ML (1 VIAL) VIA NEBULIZER EVERY 4 TO 6 HOURS AS NEEDED, Disp: 360 mL, Rfl: 3    diclofenac sodium (VOLTAREN) 1 % GEL, Apply 2 g topically 4 times daily, Disp: 350 g, Rfl: 2    methocarbamol (ROBAXIN) 750 MG tablet, Take 1 tablet by mouth 4 times daily as needed (Muscle Spasms), Disp:

## 2023-09-25 NOTE — PROGRESS NOTES
Patient is here for neck and upper back pain. Patient states no injury, pain for a few months. Janette Escalante DO.   Electronically signed by Christi Moreno LPN on 1/06/3432 at 6:54 AM

## 2023-09-26 ENCOUNTER — CLINICAL DOCUMENTATION (OUTPATIENT)
Dept: INFUSION THERAPY | Age: 28
End: 2023-09-26

## 2023-09-26 NOTE — PROGRESS NOTES
Per Dr Marge Yan request, called pt and informed her, there were no abnormalities in her labs that required immediate attention; that she should keep her scheduled appts; pt was agreeable

## 2023-09-30 ENCOUNTER — HOSPITAL ENCOUNTER (OUTPATIENT)
Dept: MRI IMAGING | Age: 28
End: 2023-09-30
Payer: MEDICAID

## 2023-09-30 DIAGNOSIS — M54.6 CHRONIC MIDLINE THORACIC BACK PAIN: ICD-10-CM

## 2023-09-30 DIAGNOSIS — G89.29 CHRONIC BILATERAL THORACIC BACK PAIN: ICD-10-CM

## 2023-09-30 DIAGNOSIS — M54.6 CHRONIC BILATERAL THORACIC BACK PAIN: ICD-10-CM

## 2023-09-30 DIAGNOSIS — M54.2 NECK PAIN: ICD-10-CM

## 2023-09-30 DIAGNOSIS — G89.29 CHRONIC MIDLINE THORACIC BACK PAIN: ICD-10-CM

## 2023-09-30 PROCEDURE — 72146 MRI CHEST SPINE W/O DYE: CPT

## 2023-09-30 PROCEDURE — 72141 MRI NECK SPINE W/O DYE: CPT

## 2023-10-13 DIAGNOSIS — R58 BLEEDING: Primary | ICD-10-CM

## 2023-10-17 ENCOUNTER — PATIENT MESSAGE (OUTPATIENT)
Dept: FAMILY MEDICINE CLINIC | Age: 28
End: 2023-10-17

## 2023-10-17 DIAGNOSIS — M54.2 NECK PAIN: Primary | ICD-10-CM

## 2023-10-17 DIAGNOSIS — G89.29 CHRONIC PAIN OF BOTH SHOULDERS: ICD-10-CM

## 2023-10-17 DIAGNOSIS — M25.551 BILATERAL HIP PAIN: ICD-10-CM

## 2023-10-17 DIAGNOSIS — M25.552 BILATERAL HIP PAIN: ICD-10-CM

## 2023-10-17 DIAGNOSIS — M25.511 CHRONIC PAIN OF BOTH SHOULDERS: ICD-10-CM

## 2023-10-17 DIAGNOSIS — M54.6 CHRONIC MIDLINE THORACIC BACK PAIN: ICD-10-CM

## 2023-10-17 DIAGNOSIS — M25.512 CHRONIC PAIN OF BOTH SHOULDERS: ICD-10-CM

## 2023-10-17 DIAGNOSIS — G89.29 CHRONIC MIDLINE THORACIC BACK PAIN: ICD-10-CM

## 2023-10-17 NOTE — TELEPHONE ENCOUNTER
From: Quoc Bobby  To: Dr. Oscar Morse: 10/17/2023 10:09 AM EDT  Subject: Referral    I decided to do physical therapy I already made an appointment at [facility] next door.

## 2023-10-20 ENCOUNTER — HOSPITAL ENCOUNTER (OUTPATIENT)
Dept: INFUSION THERAPY | Age: 28
Discharge: HOME | End: 2023-10-20

## 2023-10-20 ENCOUNTER — OFFICE VISIT (OUTPATIENT)
Dept: ONCOLOGY | Age: 28
End: 2023-10-20
Payer: MEDICAID

## 2023-10-20 VITALS
BODY MASS INDEX: 41.62 KG/M2 | HEART RATE: 89 BPM | DIASTOLIC BLOOD PRESSURE: 58 MMHG | SYSTOLIC BLOOD PRESSURE: 129 MMHG | WEIGHT: 249.8 LBS | TEMPERATURE: 97.3 F | OXYGEN SATURATION: 98 % | HEIGHT: 65 IN

## 2023-10-20 DIAGNOSIS — R58 BLEEDING: Primary | ICD-10-CM

## 2023-10-20 PROCEDURE — G8484 FLU IMMUNIZE NO ADMIN: HCPCS | Performed by: INTERNAL MEDICINE

## 2023-10-20 PROCEDURE — G8417 CALC BMI ABV UP PARAM F/U: HCPCS | Performed by: INTERNAL MEDICINE

## 2023-10-20 PROCEDURE — 99214 OFFICE O/P EST MOD 30 MIN: CPT | Performed by: INTERNAL MEDICINE

## 2023-10-20 PROCEDURE — G8427 DOCREV CUR MEDS BY ELIG CLIN: HCPCS | Performed by: INTERNAL MEDICINE

## 2023-10-20 PROCEDURE — 1036F TOBACCO NON-USER: CPT | Performed by: INTERNAL MEDICINE

## 2023-10-20 NOTE — PROGRESS NOTES
[address] [facility] ONCOLOGY  [address] [facility] [address] [place] [address]  Dept: [phone]  Loc: [phone]  Attending progress note      Reason for Visit:   Increased activity of the coagulation factors. Referring Physician:  Rony Larose DO    PCP:  Rony Larose DO    History of Present Illness:     Ms. Faina Crowell is a pleasant 30-year-old female patient, with a past medical history significant for PCOS, migraine headaches, and asthma, who was referred to the hematology office due to abnormal levels of the coagulation factors. The patient had prolonged bleeding after shaving her legs for about 4 hours, no family history of bleeding disorders, her mom had blood clots, also had anemia requiring blood transfusions. The patient had a work-up done, factor X activity is 159%, factor XI activity was 164%, factor VII activity greater than 1 5 0%, factor VIII 122%, factor IX greater than 1 5 0%, Antithrombin III is 103%, factor V Leiden mutation is negative. The patient has easy bruising, headaches, and shoulder pain. Review of Systems;  CONSTITUTIONAL: No fever, chills. Good appetite, positive for fatigue. ENMT: Eyes: No diplopia; Nose: No epistaxis. Mouth: No sore throat. RESPIRATORY: No hemoptysis, shortness of breath, cough. CARDIOVASCULAR: No chest pain, palpitations. GASTROINTESTINAL: No nausea/vomiting, abdominal pain, diarrhea/constipation. GENITOURINARY: No dysuria, urinary frequency, hematuria. NEURO: No syncope, presyncope, pos for headache.   Remainder:  ROS NEGATIVE    Past Medical History:      Diagnosis Date    Asthma     Chronic fatigue     Class 3 severe obesity due to excess calories with serious comorbidity and body mass index (BMI) of 50.0 to 59.9 in adult [facility])     Elevated liver transaminase level 09/17/2021    Kidney stone     Migraine without aura 09/17/2021    Migraines     PCOS (polycystic ovarian syndrome)     Sleep apnea

## 2023-10-26 ENCOUNTER — NURSE ONLY (OUTPATIENT)
Dept: FAMILY MEDICINE CLINIC | Age: 28
End: 2023-10-26
Payer: MEDICAID

## 2023-10-26 PROCEDURE — 90471 IMMUNIZATION ADMIN: CPT | Performed by: STUDENT IN AN ORGANIZED HEALTH CARE EDUCATION/TRAINING PROGRAM

## 2023-10-26 PROCEDURE — 90674 CCIIV4 VAC NO PRSV 0.5 ML IM: CPT | Performed by: STUDENT IN AN ORGANIZED HEALTH CARE EDUCATION/TRAINING PROGRAM

## 2023-11-14 ENCOUNTER — APPOINTMENT (OUTPATIENT)
Dept: ULTRASOUND IMAGING | Age: 28
End: 2023-11-14
Payer: MEDICAID

## 2023-11-14 ENCOUNTER — HOSPITAL ENCOUNTER (EMERGENCY)
Age: 28
Discharge: HOME | End: 2023-11-14
Attending: EMERGENCY MEDICINE
Payer: MEDICAID

## 2023-11-14 ENCOUNTER — APPOINTMENT (OUTPATIENT)
Dept: GENERAL RADIOLOGY | Age: 28
End: 2023-11-14
Payer: MEDICAID

## 2023-11-14 ENCOUNTER — APPOINTMENT (OUTPATIENT)
Dept: CT IMAGING | Age: 28
End: 2023-11-14
Payer: MEDICAID

## 2023-11-14 VITALS
RESPIRATION RATE: 22 BRPM | TEMPERATURE: 96.9 F | SYSTOLIC BLOOD PRESSURE: 153 MMHG | DIASTOLIC BLOOD PRESSURE: 103 MMHG | OXYGEN SATURATION: 100 % | HEART RATE: 87 BPM

## 2023-11-14 DIAGNOSIS — K80.50 BILIARY COLIC: ICD-10-CM

## 2023-11-14 DIAGNOSIS — R10.13 ABDOMINAL PAIN, EPIGASTRIC: Primary | ICD-10-CM

## 2023-11-14 DIAGNOSIS — I10 HYPERTENSION, UNSPECIFIED TYPE: ICD-10-CM

## 2023-11-14 DIAGNOSIS — R11.0 NAUSEA: ICD-10-CM

## 2023-11-14 LAB
ALBUMIN SERPL-MCNC: 4.5 G/DL (ref 3.5–5.2)
ALP SERPL-CCNC: 68 U/L (ref 35–104)
ALT SERPL-CCNC: 15 U/L (ref 0–32)
ANION GAP SERPL CALCULATED.3IONS-SCNC: 17 MMOL/L (ref 7–16)
AST SERPL-CCNC: 17 U/L (ref 0–31)
BACTERIA URNS QL MICRO: ABNORMAL
BASOPHILS # BLD: 0 K/UL (ref 0–0.2)
BASOPHILS NFR BLD: 0 % (ref 0–2)
BILIRUB SERPL-MCNC: 0.2 MG/DL (ref 0–1.2)
BILIRUB UR QL STRIP: NEGATIVE
BUN SERPL-MCNC: 16 MG/DL (ref 6–20)
CALCIUM SERPL-MCNC: 9.8 MG/DL (ref 8.6–10.2)
CHLORIDE SERPL-SCNC: 98 MMOL/L (ref 98–107)
CLARITY UR: CLEAR
CO2 SERPL-SCNC: 22 MMOL/L (ref 22–29)
COLOR UR: YELLOW
CREAT SERPL-MCNC: 0.8 MG/DL (ref 0.5–1)
D DIMER: 204 NG/ML DDU (ref 0–232)
EOSINOPHIL # BLD: 0.11 K/UL (ref 0.05–0.5)
EOSINOPHILS RELATIVE PERCENT: 1 % (ref 0–6)
EPI CELLS #/AREA URNS HPF: ABNORMAL /HPF
ERYTHROCYTE [DISTWIDTH] IN BLOOD BY AUTOMATED COUNT: 12.3 % (ref 11.5–15)
GFR SERPL CREATININE-BSD FRML MDRD: >60 ML/MIN/1.73M2
GLUCOSE SERPL-MCNC: 160 MG/DL (ref 74–99)
GLUCOSE UR STRIP-MCNC: NEGATIVE MG/DL
HCG, URINE, POC: NEGATIVE
HCT VFR BLD AUTO: 42.5 % (ref 34–48)
HGB BLD-MCNC: 14.1 G/DL (ref 11.5–15.5)
HGB UR QL STRIP.AUTO: ABNORMAL
KETONES UR STRIP-MCNC: 15 MG/DL
LEUKOCYTE ESTERASE UR QL STRIP: ABNORMAL
LIPASE SERPL-CCNC: 30 U/L (ref 13–60)
LYMPHOCYTES NFR BLD: 5.54 K/UL (ref 1.5–4)
LYMPHOCYTES RELATIVE PERCENT: 44 % (ref 20–42)
Lab: NORMAL
MCH RBC QN AUTO: 28.3 PG (ref 26–35)
MCHC RBC AUTO-ENTMCNC: 33.2 G/DL (ref 32–34.5)
MCV RBC AUTO: 85.2 FL (ref 80–99.9)
METAMYELOCYTES ABSOLUTE COUNT: 0.11 K/UL (ref 0–0.12)
METAMYELOCYTES: 1 % (ref 0–1)
MONOCYTES NFR BLD: 0.65 K/UL (ref 0.1–0.95)
MONOCYTES NFR BLD: 5 % (ref 2–12)
NEGATIVE QC PASS/FAIL: NORMAL
NEUTROPHILS NFR BLD: 49 % (ref 43–80)
NEUTS SEG NFR BLD: 6.09 K/UL (ref 1.8–7.3)
NITRITE UR QL STRIP: NEGATIVE
PH UR STRIP: 6 [PH] (ref 5–9)
PLATELET # BLD AUTO: 514 K/UL (ref 130–450)
PMV BLD AUTO: 9.6 FL (ref 7–12)
POSITIVE QC PASS/FAIL: NORMAL
POTASSIUM SERPL-SCNC: 3.8 MMOL/L (ref 3.5–5)
PROT SERPL-MCNC: 7.6 G/DL (ref 6.4–8.3)
PROT UR STRIP-MCNC: ABNORMAL MG/DL
RBC # BLD AUTO: 4.99 M/UL (ref 3.5–5.5)
RBC # BLD: ABNORMAL 10*6/UL
RBC #/AREA URNS HPF: ABNORMAL /HPF
SODIUM SERPL-SCNC: 137 MMOL/L (ref 132–146)
SP GR UR STRIP: >1.03 (ref 1–1.03)
TROPONIN I SERPL HS-MCNC: <6 NG/L (ref 0–9)
UROBILINOGEN UR STRIP-ACNC: 1 EU/DL (ref 0–1)
WBC #/AREA URNS HPF: ABNORMAL /HPF
WBC OTHER # BLD: 12.5 K/UL (ref 4.5–11.5)

## 2023-11-14 PROCEDURE — 2500000003 HC RX 250 WO HCPCS: Performed by: STUDENT IN AN ORGANIZED HEALTH CARE EDUCATION/TRAINING PROGRAM

## 2023-11-14 PROCEDURE — 81001 URINALYSIS AUTO W/SCOPE: CPT

## 2023-11-14 PROCEDURE — 83690 ASSAY OF LIPASE: CPT

## 2023-11-14 PROCEDURE — 99285 EMERGENCY DEPT VISIT HI MDM: CPT

## 2023-11-14 PROCEDURE — 74177 CT ABD & PELVIS W/CONTRAST: CPT

## 2023-11-14 PROCEDURE — 99284 EMERGENCY DEPT VISIT MOD MDM: CPT | Performed by: SURGERY

## 2023-11-14 PROCEDURE — 84484 ASSAY OF TROPONIN QUANT: CPT

## 2023-11-14 PROCEDURE — 85379 FIBRIN DEGRADATION QUANT: CPT

## 2023-11-14 PROCEDURE — 76705 ECHO EXAM OF ABDOMEN: CPT

## 2023-11-14 PROCEDURE — 85025 COMPLETE CBC W/AUTO DIFF WBC: CPT

## 2023-11-14 PROCEDURE — 6360000002 HC RX W HCPCS: Performed by: STUDENT IN AN ORGANIZED HEALTH CARE EDUCATION/TRAINING PROGRAM

## 2023-11-14 PROCEDURE — 2580000003 HC RX 258: Performed by: STUDENT IN AN ORGANIZED HEALTH CARE EDUCATION/TRAINING PROGRAM

## 2023-11-14 PROCEDURE — 71045 X-RAY EXAM CHEST 1 VIEW: CPT

## 2023-11-14 PROCEDURE — 80053 COMPREHEN METABOLIC PANEL: CPT

## 2023-11-14 PROCEDURE — 96374 THER/PROPH/DIAG INJ IV PUSH: CPT

## 2023-11-14 PROCEDURE — 6360000004 HC RX CONTRAST MEDICATION: Performed by: RADIOLOGY

## 2023-11-14 PROCEDURE — 93005 ELECTROCARDIOGRAM TRACING: CPT | Performed by: EMERGENCY MEDICINE

## 2023-11-14 PROCEDURE — 96375 TX/PRO/DX INJ NEW DRUG ADDON: CPT

## 2023-11-14 PROCEDURE — 6370000000 HC RX 637 (ALT 250 FOR IP)

## 2023-11-14 RX ORDER — MORPHINE SULFATE 4 MG/ML
4 INJECTION, SOLUTION INTRAMUSCULAR; INTRAVENOUS ONCE
Status: COMPLETED | OUTPATIENT
Start: 2023-11-14 | End: 2023-11-14

## 2023-11-14 RX ORDER — PANTOPRAZOLE SODIUM 40 MG/1
40 TABLET, DELAYED RELEASE ORAL
Qty: 90 TABLET | Refills: 3 | Status: SHIPPED | OUTPATIENT
Start: 2023-11-14

## 2023-11-14 RX ORDER — ONDANSETRON 4 MG/1
4 TABLET, ORALLY DISINTEGRATING ORAL EVERY 12 HOURS PRN
Qty: 8 TABLET | Refills: 0 | Status: SHIPPED | OUTPATIENT
Start: 2023-11-14 | End: 2023-11-18

## 2023-11-14 RX ORDER — HYDROMORPHONE HYDROCHLORIDE 1 MG/ML
1 INJECTION, SOLUTION INTRAMUSCULAR; INTRAVENOUS; SUBCUTANEOUS ONCE
Status: COMPLETED | OUTPATIENT
Start: 2023-11-14 | End: 2023-11-14

## 2023-11-14 RX ORDER — ONDANSETRON 2 MG/ML
4 INJECTION INTRAMUSCULAR; INTRAVENOUS ONCE
Status: COMPLETED | OUTPATIENT
Start: 2023-11-14 | End: 2023-11-14

## 2023-11-14 RX ORDER — 0.9 % SODIUM CHLORIDE 0.9 %
1000 INTRAVENOUS SOLUTION INTRAVENOUS ONCE
Status: COMPLETED | OUTPATIENT
Start: 2023-11-14 | End: 2023-11-14

## 2023-11-14 RX ORDER — PANTOPRAZOLE SODIUM 40 MG/1
40 TABLET, DELAYED RELEASE ORAL
Status: DISCONTINUED | OUTPATIENT
Start: 2023-11-14 | End: 2023-11-14 | Stop reason: HOSPADM

## 2023-11-14 RX ORDER — KETOROLAC TROMETHAMINE 30 MG/ML
15 INJECTION, SOLUTION INTRAMUSCULAR; INTRAVENOUS ONCE
Status: DISCONTINUED | OUTPATIENT
Start: 2023-11-14 | End: 2023-11-14

## 2023-11-14 RX ADMIN — MORPHINE SULFATE 4 MG: 4 INJECTION, SOLUTION INTRAMUSCULAR; INTRAVENOUS at 02:49

## 2023-11-14 RX ADMIN — HYDROMORPHONE HYDROCHLORIDE 1 MG: 1 INJECTION, SOLUTION INTRAMUSCULAR; INTRAVENOUS; SUBCUTANEOUS at 05:56

## 2023-11-14 RX ADMIN — ONDANSETRON 4 MG: 2 INJECTION INTRAMUSCULAR; INTRAVENOUS at 02:49

## 2023-11-14 RX ADMIN — SODIUM CHLORIDE 1000 ML: 9 INJECTION, SOLUTION INTRAVENOUS at 02:48

## 2023-11-14 RX ADMIN — ALUMINUM HYDROXIDE, MAGNESIUM HYDROXIDE, AND SIMETHICONE: 200; 200; 20 SUSPENSION ORAL at 09:47

## 2023-11-14 RX ADMIN — PANTOPRAZOLE SODIUM 40 MG: 40 TABLET, DELAYED RELEASE ORAL at 09:48

## 2023-11-14 RX ADMIN — IOPAMIDOL 75 ML: 755 INJECTION, SOLUTION INTRAVENOUS at 05:49

## 2023-11-14 ASSESSMENT — PAIN SCALES - GENERAL
PAINLEVEL_OUTOF10: 9
PAINLEVEL_OUTOF10: 9

## 2023-11-14 ASSESSMENT — PATIENT HEALTH QUESTIONNAIRE - PHQ9
SUM OF ALL RESPONSES TO PHQ QUESTIONS 1-9: 0
1. LITTLE INTEREST OR PLEASURE IN DOING THINGS: 0
SUM OF ALL RESPONSES TO PHQ QUESTIONS 1-9: 0
SUM OF ALL RESPONSES TO PHQ9 QUESTIONS 1 & 2: 0
2. FEELING DOWN, DEPRESSED OR HOPELESS: 0

## 2023-11-14 ASSESSMENT — LIFESTYLE VARIABLES: HOW OFTEN DO YOU HAVE A DRINK CONTAINING ALCOHOL: NEVER

## 2023-11-14 ASSESSMENT — PAIN DESCRIPTION - DESCRIPTORS
DESCRIPTORS: ACHING;SHOOTING;SHARP
DESCRIPTORS: ACHING;SPASM;SQUEEZING

## 2023-11-14 ASSESSMENT — PAIN DESCRIPTION - LOCATION
LOCATION: ABDOMEN
LOCATION: ABDOMEN

## 2023-11-14 ASSESSMENT — PAIN - FUNCTIONAL ASSESSMENT: PAIN_FUNCTIONAL_ASSESSMENT: 0-10

## 2023-11-14 ASSESSMENT — PAIN DESCRIPTION - ORIENTATION: ORIENTATION: MID

## 2023-11-14 NOTE — ED PROVIDER NOTES
[place]        Pt Name: Collette Guerrero  MRN: [id number]  [address] 1995  Date of evaluation: 11/14/2023  Provider: Petra Gunter DO  PCP: Rony Larose DO  Note Started: 2:26 AM EST 11/14/23    CHIEF COMPLAINT       Chief Complaint   Patient presents with    Abdominal Pain     Mid upper ABD pain that radiates across and down woke her up about 40min ago. Nauseated. HISTORY OF PRESENT ILLNESS: 1 or more Elements   History From: Patient    Limitations to history : None    Samira Crowell is a 33 y.o. female who presents to the emergency department for complaint of epigastric pain that radiates underneath her breast and into her abdomen that started a few hours prior to arrival.  Patient states the pain has increased in severity. Patient states the pain woke her up from sleep. She reports nausea without any emesis. Patient denies any previous abdominal surgeries. She denies any fevers, chills, shortness of breath, back pain, dysuria, and diarrhea. Nursing Notes were all reviewed and agreed with or any disagreements were addressed in the HPI. REVIEW OF EXTERNAL NOTE :       Office visit 10/20/2023 reviewed, patient seen by PCP for follow-up of bleeding. She does have history of PCOS, migraines, asthma and had abnormal coags. REVIEW OF SYSTEMS :           Positives and Pertinent negatives as per HPI. SURGICAL HISTORY   History reviewed. No pertinent surgical history.     [place]       Discharge Medication List as of 11/14/2023  9:30 AM        CONTINUE these medications which have NOT CHANGED    Details   celecoxib (CELEBREX) 200 MG capsule Take 1 capsule by mouth 2 times daily, Disp-180 capsule, R-1Normal      drospirenone-ethinyl estradiol (KELLIE) 3-0.02 MG per tablet take 1 tablet by mouth once daily TAKE CONTINUOUSLY SKIP PLACEBO WEEKSHistorical Med      spironolactone (ALDACTONE)

## 2023-11-14 NOTE — CONSULTS
GENERAL SURGERY  CONSULT NOTE  11/14/2023    Physician Consulted: Dr. Rosey Diez  Reason for Consult: Possible cholecystitis   Referring Physician: Dr. Carisa MCLAUGHLIN  Marquis Agosto is a 33 y.o. female who presents to the general surgery service for evaluation of epigastric pain. Patient says that she had a sudden, sharp epigastric pain starting today at midnight. Pain woke her up from sleep and. She also endorses some nausea with that as well, but no episodes of vomiting. Says she had a bowel movement earlier this morning, but denies any hematochezia or melena. Patient denies any fever/chills, denies any shortness of breath/chest pain. Patient says she never had any pain like that before. She says she ate shepherds pie before sleeping yesterday and had no problems at all. She says she is not sure if any type of food exacerbates the pain since she has not eaten anything since the pain started. Patient denies any scopes history. In the ED, her BMP was unremarkable. CBC showed a white blood count of 12.2, but otherwise was unremarkable. Her hepatic function panel was normal unremarkable. Her CT scan showed no gallbladder wall thickening, no evidence of gallstones or inflammatory changes around the gallbladder. Medical history is significant for PCOS, asthma and obesity. She has no abdominal surgical history. Patient is not taking any blood thinning medications. She reports that she has never smoked. She has been exposed to tobacco smoke. She has never used smokeless tobacco. She reports current alcohol use. She reports that she does not use drugs. She denies using any chronic NSAIDs.     Past Medical History:   Diagnosis Date    Asthma     Chronic fatigue     Class 3 severe obesity due to excess calories with serious comorbidity and body mass index (BMI) of 50.0 to 59.9 in adult [facility])     Elevated liver transaminase level 09/17/2021    Kidney stone     Migraine without aura 09/17/2021    Migraines

## 2023-11-14 NOTE — DISCHARGE INSTRUCTIONS
Please follow-up with general surgery as discussed as well as her primary care doctor. Please follow-up with your primary care doctor on your blood pressure.   Please return ED for any new or worsening symptoms

## 2023-11-16 LAB
EKG ATRIAL RATE: 111 BPM
EKG P AXIS: 62 DEGREES
EKG P-R INTERVAL: 120 MS
EKG Q-T INTERVAL: 346 MS
EKG QRS DURATION: 62 MS
EKG QTC CALCULATION (BAZETT): 470 MS
EKG R AXIS: 50 DEGREES
EKG T AXIS: 36 DEGREES
EKG VENTRICULAR RATE: 111 BPM

## 2023-11-22 ENCOUNTER — OFFICE VISIT (OUTPATIENT)
Dept: FAMILY MEDICINE CLINIC | Age: 28
End: 2023-11-22
Payer: MEDICAID

## 2023-11-22 VITALS
DIASTOLIC BLOOD PRESSURE: 80 MMHG | BODY MASS INDEX: 41.93 KG/M2 | TEMPERATURE: 97.7 F | SYSTOLIC BLOOD PRESSURE: 116 MMHG | RESPIRATION RATE: 20 BRPM | OXYGEN SATURATION: 98 % | HEART RATE: 87 BPM | WEIGHT: 252 LBS

## 2023-11-22 DIAGNOSIS — R73.03 PREDIABETES: ICD-10-CM

## 2023-11-22 DIAGNOSIS — R73.9 HYPERGLYCEMIA: Primary | ICD-10-CM

## 2023-11-22 DIAGNOSIS — R00.0 SINUS TACHYCARDIA: ICD-10-CM

## 2023-11-22 DIAGNOSIS — K82.9 GALLBLADDER DISEASE: ICD-10-CM

## 2023-11-22 LAB — HBA1C MFR BLD: 6.4 %

## 2023-11-22 PROCEDURE — 93000 ELECTROCARDIOGRAM COMPLETE: CPT | Performed by: STUDENT IN AN ORGANIZED HEALTH CARE EDUCATION/TRAINING PROGRAM

## 2023-11-22 PROCEDURE — 99214 OFFICE O/P EST MOD 30 MIN: CPT | Performed by: STUDENT IN AN ORGANIZED HEALTH CARE EDUCATION/TRAINING PROGRAM

## 2023-11-22 PROCEDURE — G8417 CALC BMI ABV UP PARAM F/U: HCPCS | Performed by: STUDENT IN AN ORGANIZED HEALTH CARE EDUCATION/TRAINING PROGRAM

## 2023-11-22 PROCEDURE — G8427 DOCREV CUR MEDS BY ELIG CLIN: HCPCS | Performed by: STUDENT IN AN ORGANIZED HEALTH CARE EDUCATION/TRAINING PROGRAM

## 2023-11-22 PROCEDURE — 1036F TOBACCO NON-USER: CPT | Performed by: STUDENT IN AN ORGANIZED HEALTH CARE EDUCATION/TRAINING PROGRAM

## 2023-11-22 PROCEDURE — G8482 FLU IMMUNIZE ORDER/ADMIN: HCPCS | Performed by: STUDENT IN AN ORGANIZED HEALTH CARE EDUCATION/TRAINING PROGRAM

## 2023-11-22 PROCEDURE — 83036 HEMOGLOBIN GLYCOSYLATED A1C: CPT | Performed by: STUDENT IN AN ORGANIZED HEALTH CARE EDUCATION/TRAINING PROGRAM

## 2023-11-22 ASSESSMENT — ENCOUNTER SYMPTOMS
COUGH: 0
EYE PAIN: 0
EYE REDNESS: 0
RHINORRHEA: 0
ABDOMINAL PAIN: 0
SINUS PRESSURE: 0
SHORTNESS OF BREATH: 0
SINUS PAIN: 0
DIARRHEA: 0
CONSTIPATION: 0
SORE THROAT: 0
NAUSEA: 0
VOMITING: 0

## 2023-11-22 NOTE — PROGRESS NOTES
11/22/2023    \[facility] [place] [facility]\""  Chief Complaint   Patient presents with    Follow-up     ER        Jg Hdez is a 33 y.o. female who  has a past medical history of Asthma, Chronic fatigue, Class 3 severe obesity due to excess calories with serious comorbidity and body mass index (BMI) of 50.0 to 59.9 in adult [facility]), Elevated liver transaminase level, Kidney stone, Migraine without aura, Migraines, PCOS (polycystic ovarian syndrome), and Sleep apnea. Patient is here today for an emergency room follow-up. She went a few days ago for uncontrollable abdominal pain. She was there for several hours without any pain medication. She was left alone for a while in a hallway as well waiting for transportation to bring her back to the emergency room to her actual room. She was given Dilaudid for the pain. She did see the general surgeon there who told her that she should follow-up outpatient instead of inpatient as it would be cheaper as an outpatient. Labs, imaging, EKG, and notes reviewed by myself personally from the ER. Labs within normal limits other than sugar was elevated. EKG showed sinus tachycardia and could not rule out anterior infarct. IMPRESSION:  Gallbladder is borderline distended. This is a nonspecific finding. Could obtain ultrasound if there is any concern for acute cholecystitis. Otherwise no significant abnormality identified. Gallbaldder IS: IMPRESSION:  Unremarkable right upper quadrant ultrasound. Review of Systems   Constitutional:  Negative for chills, fatigue and fever. HENT:  Negative for congestion, ear pain, postnasal drip, rhinorrhea, sinus pressure, sinus pain and sore throat. Eyes:  Negative for pain and redness. Respiratory:  Negative for cough and shortness of breath. Cardiovascular:  Negative for chest pain. Gastrointestinal:  Negative for abdominal pain, constipation, diarrhea, nausea and vomiting.    Genitourinary:  Negative for difficulty urinating and

## 2023-12-08 ENCOUNTER — PATIENT MESSAGE (OUTPATIENT)
Dept: FAMILY MEDICINE CLINIC | Age: 28
End: 2023-12-08

## 2023-12-11 ENCOUNTER — PATIENT MESSAGE (OUTPATIENT)
Dept: FAMILY MEDICINE CLINIC | Age: 28
End: 2023-12-11

## 2023-12-11 NOTE — TELEPHONE ENCOUNTER
From: Samira Nunez  To: Dr. Janette Escalante  Sent: 12/11/2023 2:20 PM EST  Subject: Medication     Hi there,  I know I have mentioned before that I have been having issues with my psych doctor and unfortunately I still am. I was wondering if you can or are able to take on prescribing my medication until I find a new psych doctor

## 2023-12-14 ENCOUNTER — TELEPHONE (OUTPATIENT)
Dept: SURGERY | Age: 28
End: 2023-12-14

## 2023-12-14 DIAGNOSIS — U07.1 COVID-19: Primary | ICD-10-CM

## 2023-12-14 NOTE — TELEPHONE ENCOUNTER
Patient call to change office visit to virtual due to testing positive for Covid yesterday. MA advised patient per Dr Sukumar Amaya that she will need in-office exam. She is requesting an early AM appointment time. MA unsure of Dr Rose Harper availability and is forwarding the message to his MA April. Advised patient April will call to reschedule her office visit.     Electronically signed by Manny Vivas MA on 12/14/2023 at 12:59 PM

## 2023-12-29 ENCOUNTER — OFFICE VISIT (OUTPATIENT)
Dept: SURGERY | Age: 28
End: 2023-12-29
Payer: MEDICAID

## 2023-12-29 VITALS
DIASTOLIC BLOOD PRESSURE: 76 MMHG | TEMPERATURE: 97.3 F | SYSTOLIC BLOOD PRESSURE: 124 MMHG | OXYGEN SATURATION: 98 % | WEIGHT: 252.6 LBS | HEART RATE: 68 BPM | BODY MASS INDEX: 42.09 KG/M2 | HEIGHT: 65 IN

## 2023-12-29 DIAGNOSIS — R10.13 EPIGASTRIC PAIN: Primary | ICD-10-CM

## 2023-12-29 PROCEDURE — G8417 CALC BMI ABV UP PARAM F/U: HCPCS | Performed by: SURGERY

## 2023-12-29 PROCEDURE — 1036F TOBACCO NON-USER: CPT | Performed by: SURGERY

## 2023-12-29 PROCEDURE — G8427 DOCREV CUR MEDS BY ELIG CLIN: HCPCS | Performed by: SURGERY

## 2023-12-29 PROCEDURE — 99203 OFFICE O/P NEW LOW 30 MIN: CPT | Performed by: SURGERY

## 2023-12-29 PROCEDURE — G8482 FLU IMMUNIZE ORDER/ADMIN: HCPCS | Performed by: SURGERY

## 2023-12-29 RX ORDER — SODIUM CHLORIDE 9 MG/ML
25 INJECTION, SOLUTION INTRAVENOUS PRN
OUTPATIENT
Start: 2023-12-29

## 2023-12-29 RX ORDER — SODIUM CHLORIDE 0.9 % (FLUSH) 0.9 %
5-40 SYRINGE (ML) INJECTION PRN
OUTPATIENT
Start: 2023-12-29

## 2023-12-29 RX ORDER — SODIUM CHLORIDE 0.9 % (FLUSH) 0.9 %
5-40 SYRINGE (ML) INJECTION EVERY 12 HOURS SCHEDULED
OUTPATIENT
Start: 2023-12-29

## 2023-12-29 RX ORDER — SUCRALFATE 1 G/1
1 TABLET ORAL 4 TIMES DAILY
Qty: 120 TABLET | Refills: 3 | Status: SHIPPED | OUTPATIENT
Start: 2023-12-29

## 2023-12-29 NOTE — PROGRESS NOTES
11/14/2023 02:40 AM    ALT 15 11/14/2023 02:40 AM     PT/INR:    Lab Results   Component Value Date/Time    PROTIME 11.1 09/15/2023 08:46 AM    INR 1.0 09/15/2023 08:46 AM     HgBA1c:    Lab Results   Component Value Date/Time    LABA1C 6.4 11/22/2023 03:45 PM     LIPASE:    Lab Results   Component Value Date/Time    LIPASE 30 11/14/2023 02:40 AM          Flaquita Lovett,     I have examined the patient and performed the key aspects of physical exam, and reviewed the record (including laboratory findings, results, and all pertinent radiology images, which are independently reviewed and interpreted unless otherwise explicitly stated). The referring provider's notes were also reviewed. Any procedures planned or discussed as above had risks, benefits, and reasonable alternatives thoroughly discussed with the patient or responsible party. NOTE: This report, in part or full, may have been transcribed using voice recognition software. Every effort was made to ensure accuracy; however, inadvertent computerized transcription errors may be present. Please excuse any transcriptional grammatical or spelling errors that may have escaped my editorial review.

## 2024-01-02 ENCOUNTER — TELEPHONE (OUTPATIENT)
Dept: SURGERY | Age: 29
End: 2024-01-02

## 2024-01-02 NOTE — TELEPHONE ENCOUNTER
Scheduled patient for EGD on 1-12-24 in [place] at 9:15 AM . Patient needs to report at the front entrance 1 hour prior to the procedure, no ASA products for 7 days. NPO after midnight. Scheduled HIDA Scan on 1-5-24 in [place] at 8:00 Am. Patient verbalized understanding. Instruction letter mailed. Encouraged patient to call our office if any questions.    Electronically signed by Tiana Farr MA on 1/2/2024 at 11:33 AM

## 2024-01-02 NOTE — TELEPHONE ENCOUNTER
Prior Authorization Form:      DEMOGRAPHICS:                     Patient Name:  Rani Colon  Patient :  1995            Insurance:  Payor: [employer] / Plan: [employer] [place]AN [place] / Product Type: *No Product type* /   Insurance ID Number:    Payer/Plan Subscr  Sex Relation Sub. Ins. ID Effective Group Num   1. [facility]* RANI COLON 1995 Female Self [id number]                                   [address]         DIAGNOSIS & PROCEDURE:                       Procedure/Operation: EGD           CPT Code: 79151    Diagnosis:  epigastric pain    ICD10 Code: R10.13    Location:  [place]    Surgeon:  Dr. Zaidi    SCHEDULING INFORMATION:                          Date: 24    Time: 9:15am              Anesthesia:  MAC                                                       Status:  Outpatient        Special Comments:  N/A       Electronically signed by Tiana Farr MA on 2024 at 11:21 AM

## 2024-01-04 ENCOUNTER — TELEPHONE (OUTPATIENT)
Dept: SURGERY | Age: 29
End: 2024-01-04

## 2024-01-04 NOTE — TELEPHONE ENCOUNTER
Called and spoke with the patient on the phone. MA told the pt that we are going to hold off EGD until we hear from [facility]. [facility] system needs to be updated in order to obtain the prior authorization with Dr. Zaidi.  The patient verbalized understanding and she is agreeable. Cancelled the procedure.  Electronically signed by Olivia Menezes on 1/4/2024 at 1:52 PM

## 2024-01-08 ENCOUNTER — HOSPITAL ENCOUNTER (OUTPATIENT)
Age: 29
Discharge: HOME | End: 2024-01-08
Payer: MEDICAID

## 2024-01-08 LAB
AMPHET UR QL SCN: POSITIVE
BARBITURATES UR QL SCN: NEGATIVE
BENZODIAZ UR QL: NEGATIVE
BUPRENORPHINE UR QL: NEGATIVE
CANNABINOIDS UR QL SCN: NEGATIVE
COCAINE UR QL SCN: NEGATIVE
FENTANYL UR QL: NEGATIVE
METHADONE UR QL: NEGATIVE
OPIATES UR QL SCN: NEGATIVE
OXYCODONE UR QL SCN: NEGATIVE
PCP UR QL SCN: NEGATIVE
TEST INFORMATION: ABNORMAL

## 2024-01-08 PROCEDURE — 80307 DRUG TEST PRSMV CHEM ANLYZR: CPT

## 2024-01-08 PROCEDURE — 36415 COLL VENOUS BLD VENIPUNCTURE: CPT

## 2024-01-12 ENCOUNTER — HOSPITAL ENCOUNTER (OUTPATIENT)
Dept: NUCLEAR MEDICINE | Age: 29
Discharge: HOME | End: 2024-01-14
Attending: SURGERY
Payer: MEDICAID

## 2024-01-12 DIAGNOSIS — R10.13 EPIGASTRIC PAIN: ICD-10-CM

## 2024-01-12 PROCEDURE — 78227 HEPATOBIL SYST IMAGE W/DRUG: CPT

## 2024-01-12 PROCEDURE — A9537 TC99M MEBROFENIN: HCPCS | Performed by: RADIOLOGY

## 2024-01-12 PROCEDURE — 3430000000 HC RX DIAGNOSTIC RADIOPHARMACEUTICAL: Performed by: RADIOLOGY

## 2024-01-12 RX ADMIN — Medication 8 MILLICURIE: at 08:56

## 2024-01-17 ENCOUNTER — TELEPHONE (OUTPATIENT)
Dept: SURGERY | Age: 29
End: 2024-01-17

## 2024-01-17 NOTE — TELEPHONE ENCOUNTER
Scheduled patient for EGD on 2/2/24 at 10:30am in [facility]. Patient needs to report at the front entrance 1 hour prior to the procedure, no ASA products for 7 days, Patient verbalized understanding. Instruction letter mailed. Encouraged patient to call our office if any questions.     Electronically signed by Olivia Menezes on 1/17/2024 at 10:51 AM

## 2024-01-17 NOTE — TELEPHONE ENCOUNTER
Prior Authorization Form:      DEMOGRAPHICS:                     Patient Name:  Rani Colon  Patient :  1995            Insurance:  Payor: [employer] / Plan: [employer] [place]AN [place] / Product Type: *No Product type* /   Insurance ID Number:    Payer/Plan Subscr  Sex Relation Sub. Ins. ID Effective Group Num   1. [facility]* RANI COLON 1995 Female Self [id number]                                   [address]         DIAGNOSIS & PROCEDURE:                       Procedure/Operation: EGD            CPT Code: 55426    Diagnosis:  epigastric pain     ICD10 Code: R10.13    Location:  [place]     Surgeon:  Dr. Zaidi     SCHEDULING INFORMATION:                          Date: 24    Time: 10:30am              Anesthesia:  MAC/TIVA                                                       Status:  Outpatient        Special Comments:  N/A       Electronically signed by Olivia Menezes on 2024 at 10:48 AM

## 2024-01-31 ENCOUNTER — OFFICE VISIT (OUTPATIENT)
Dept: FAMILY MEDICINE CLINIC | Age: 29
End: 2024-01-31
Payer: MEDICAID

## 2024-01-31 VITALS
DIASTOLIC BLOOD PRESSURE: 80 MMHG | TEMPERATURE: 97.2 F | HEIGHT: 65 IN | HEART RATE: 114 BPM | WEIGHT: 252 LBS | RESPIRATION RATE: 18 BRPM | BODY MASS INDEX: 41.99 KG/M2 | OXYGEN SATURATION: 98 % | SYSTOLIC BLOOD PRESSURE: 120 MMHG

## 2024-01-31 DIAGNOSIS — G89.29 CHRONIC RIGHT SHOULDER PAIN: Primary | ICD-10-CM

## 2024-01-31 DIAGNOSIS — J45.909 UNCOMPLICATED ASTHMA, UNSPECIFIED ASTHMA SEVERITY, UNSPECIFIED WHETHER PERSISTENT: ICD-10-CM

## 2024-01-31 DIAGNOSIS — M25.511 CHRONIC RIGHT SHOULDER PAIN: Primary | ICD-10-CM

## 2024-01-31 DIAGNOSIS — Z91.09 ENVIRONMENTAL ALLERGIES: ICD-10-CM

## 2024-01-31 PROCEDURE — G8427 DOCREV CUR MEDS BY ELIG CLIN: HCPCS | Performed by: STUDENT IN AN ORGANIZED HEALTH CARE EDUCATION/TRAINING PROGRAM

## 2024-01-31 PROCEDURE — 1036F TOBACCO NON-USER: CPT | Performed by: STUDENT IN AN ORGANIZED HEALTH CARE EDUCATION/TRAINING PROGRAM

## 2024-01-31 PROCEDURE — G8482 FLU IMMUNIZE ORDER/ADMIN: HCPCS | Performed by: STUDENT IN AN ORGANIZED HEALTH CARE EDUCATION/TRAINING PROGRAM

## 2024-01-31 PROCEDURE — G8417 CALC BMI ABV UP PARAM F/U: HCPCS | Performed by: STUDENT IN AN ORGANIZED HEALTH CARE EDUCATION/TRAINING PROGRAM

## 2024-01-31 PROCEDURE — 99213 OFFICE O/P EST LOW 20 MIN: CPT | Performed by: STUDENT IN AN ORGANIZED HEALTH CARE EDUCATION/TRAINING PROGRAM

## 2024-01-31 RX ORDER — MONTELUKAST SODIUM 10 MG/1
10 TABLET ORAL NIGHTLY
Qty: 90 TABLET | Refills: 3 | Status: SHIPPED | OUTPATIENT
Start: 2024-01-31

## 2024-01-31 RX ORDER — ALBUTEROL SULFATE 90 UG/1
AEROSOL, METERED RESPIRATORY (INHALATION)
Qty: 18 G | Refills: 5 | Status: SHIPPED | OUTPATIENT
Start: 2024-01-31

## 2024-01-31 SDOH — ECONOMIC STABILITY: INCOME INSECURITY: HOW HARD IS IT FOR YOU TO PAY FOR THE VERY BASICS LIKE FOOD, HOUSING, MEDICAL CARE, AND HEATING?: NOT HARD AT ALL

## 2024-01-31 SDOH — ECONOMIC STABILITY: FOOD INSECURITY: WITHIN THE PAST 12 MONTHS, THE FOOD YOU BOUGHT JUST DIDN'T LAST AND YOU DIDN'T HAVE MONEY TO GET MORE.: NEVER TRUE

## 2024-01-31 SDOH — ECONOMIC STABILITY: FOOD INSECURITY: WITHIN THE PAST 12 MONTHS, YOU WORRIED THAT YOUR FOOD WOULD RUN OUT BEFORE YOU GOT MONEY TO BUY MORE.: NEVER TRUE

## 2024-01-31 ASSESSMENT — ENCOUNTER SYMPTOMS
NAUSEA: 0
EYE REDNESS: 0
CONSTIPATION: 0
EYE PAIN: 0
RHINORRHEA: 0
SINUS PAIN: 0
VOMITING: 0
COUGH: 0
SINUS PRESSURE: 0
SORE THROAT: 0
DIARRHEA: 0
ABDOMINAL PAIN: 1
SHORTNESS OF BREATH: 0

## 2024-01-31 ASSESSMENT — PATIENT HEALTH QUESTIONNAIRE - PHQ9
1. LITTLE INTEREST OR PLEASURE IN DOING THINGS: 1
SUM OF ALL RESPONSES TO PHQ QUESTIONS 1-9: 2
2. FEELING DOWN, DEPRESSED OR HOPELESS: 1
SUM OF ALL RESPONSES TO PHQ9 QUESTIONS 1 & 2: 2

## 2024-02-02 ENCOUNTER — APPOINTMENT (OUTPATIENT)
Dept: CT IMAGING | Age: 29
End: 2024-02-02
Payer: MEDICAID

## 2024-02-02 ENCOUNTER — ANESTHESIA EVENT (OUTPATIENT)
Dept: ENDOSCOPY | Age: 29
End: 2024-02-02
Payer: MEDICAID

## 2024-02-02 ENCOUNTER — HOSPITAL ENCOUNTER (EMERGENCY)
Age: 29
Discharge: HOME | End: 2024-02-02
Attending: EMERGENCY MEDICINE
Payer: MEDICAID

## 2024-02-02 ENCOUNTER — HOSPITAL ENCOUNTER (OUTPATIENT)
Age: 29
Setting detail: OUTPATIENT SURGERY
Discharge: HOME | End: 2024-02-02
Attending: SURGERY | Admitting: SURGERY
Payer: MEDICAID

## 2024-02-02 ENCOUNTER — ANESTHESIA (OUTPATIENT)
Dept: ENDOSCOPY | Age: 29
End: 2024-02-02
Payer: MEDICAID

## 2024-02-02 VITALS
HEART RATE: 73 BPM | TEMPERATURE: 97.8 F | BODY MASS INDEX: 41.65 KG/M2 | RESPIRATION RATE: 18 BRPM | DIASTOLIC BLOOD PRESSURE: 90 MMHG | WEIGHT: 250 LBS | OXYGEN SATURATION: 98 % | HEIGHT: 65 IN | SYSTOLIC BLOOD PRESSURE: 138 MMHG

## 2024-02-02 VITALS
RESPIRATION RATE: 22 BRPM | OXYGEN SATURATION: 100 % | HEART RATE: 85 BPM | DIASTOLIC BLOOD PRESSURE: 71 MMHG | TEMPERATURE: 97.5 F | SYSTOLIC BLOOD PRESSURE: 149 MMHG

## 2024-02-02 DIAGNOSIS — R10.13 EPIGASTRIC PAIN: Primary | ICD-10-CM

## 2024-02-02 DIAGNOSIS — R10.13 EPIGASTRIC PAIN: ICD-10-CM

## 2024-02-02 DIAGNOSIS — R10.84 GENERALIZED ABDOMINAL PAIN: Primary | ICD-10-CM

## 2024-02-02 LAB
ALBUMIN SERPL-MCNC: 4.4 G/DL (ref 3.5–5.2)
ALP SERPL-CCNC: 74 U/L (ref 35–104)
ALT SERPL-CCNC: 19 U/L (ref 0–32)
ANION GAP SERPL CALCULATED.3IONS-SCNC: 17 MMOL/L (ref 7–16)
AST SERPL-CCNC: 24 U/L (ref 0–31)
BACTERIA URNS QL MICRO: ABNORMAL
BASOPHILS # BLD: 0.05 K/UL (ref 0–0.2)
BASOPHILS NFR BLD: 0 % (ref 0–2)
BILIRUB SERPL-MCNC: 0.3 MG/DL (ref 0–1.2)
BILIRUB UR QL STRIP: NEGATIVE
BUN SERPL-MCNC: 13 MG/DL (ref 6–20)
CALCIUM SERPL-MCNC: 9.6 MG/DL (ref 8.6–10.2)
CHLORIDE SERPL-SCNC: 99 MMOL/L (ref 98–107)
CLARITY UR: CLEAR
CO2 SERPL-SCNC: 21 MMOL/L (ref 22–29)
COLOR UR: YELLOW
CREAT SERPL-MCNC: 0.8 MG/DL (ref 0.5–1)
EKG ATRIAL RATE: 94 BPM
EKG P AXIS: 83 DEGREES
EKG P-R INTERVAL: 122 MS
EKG Q-T INTERVAL: 364 MS
EKG QRS DURATION: 68 MS
EKG QTC CALCULATION (BAZETT): 455 MS
EKG R AXIS: 50 DEGREES
EKG T AXIS: 39 DEGREES
EKG VENTRICULAR RATE: 94 BPM
EOSINOPHIL # BLD: 0.09 K/UL (ref 0.05–0.5)
EOSINOPHILS RELATIVE PERCENT: 1 % (ref 0–6)
EPI CELLS #/AREA URNS HPF: ABNORMAL /HPF
ERYTHROCYTE [DISTWIDTH] IN BLOOD BY AUTOMATED COUNT: 12.7 % (ref 11.5–15)
GFR SERPL CREATININE-BSD FRML MDRD: >60 ML/MIN/1.73M2
GLUCOSE SERPL-MCNC: 166 MG/DL (ref 74–99)
GLUCOSE UR STRIP-MCNC: NEGATIVE MG/DL
HCG, URINE, POC: NEGATIVE
HCT VFR BLD AUTO: 42 % (ref 34–48)
HGB BLD-MCNC: 13.9 G/DL (ref 11.5–15.5)
HGB UR QL STRIP.AUTO: ABNORMAL
IMM GRANULOCYTES # BLD AUTO: 0.06 K/UL (ref 0–0.58)
IMM GRANULOCYTES NFR BLD: 0 % (ref 0–5)
KETONES UR STRIP-MCNC: NEGATIVE MG/DL
LEUKOCYTE ESTERASE UR QL STRIP: NEGATIVE
LIPASE SERPL-CCNC: 33 U/L (ref 13–60)
LYMPHOCYTES NFR BLD: 2.85 K/UL (ref 1.5–4)
LYMPHOCYTES RELATIVE PERCENT: 21 % (ref 20–42)
Lab: NORMAL
MCH RBC QN AUTO: 27.9 PG (ref 26–35)
MCHC RBC AUTO-ENTMCNC: 33.1 G/DL (ref 32–34.5)
MCV RBC AUTO: 84.3 FL (ref 80–99.9)
MONOCYTES NFR BLD: 0.77 K/UL (ref 0.1–0.95)
MONOCYTES NFR BLD: 6 % (ref 2–12)
NEGATIVE QC PASS/FAIL: NORMAL
NEUTROPHILS NFR BLD: 72 % (ref 43–80)
NEUTS SEG NFR BLD: 9.72 K/UL (ref 1.8–7.3)
NITRITE UR QL STRIP: NEGATIVE
PH UR STRIP: 6 [PH] (ref 5–9)
PLATELET # BLD AUTO: 473 K/UL (ref 130–450)
PMV BLD AUTO: 9.5 FL (ref 7–12)
POSITIVE QC PASS/FAIL: NORMAL
POTASSIUM SERPL-SCNC: 4.1 MMOL/L (ref 3.5–5)
PROT SERPL-MCNC: 7.6 G/DL (ref 6.4–8.3)
PROT UR STRIP-MCNC: NEGATIVE MG/DL
RBC # BLD AUTO: 4.98 M/UL (ref 3.5–5.5)
RBC #/AREA URNS HPF: ABNORMAL /HPF
SODIUM SERPL-SCNC: 137 MMOL/L (ref 132–146)
SP GR UR STRIP: >1.03 (ref 1–1.03)
UROBILINOGEN UR STRIP-ACNC: 0.2 EU/DL (ref 0–1)
WBC #/AREA URNS HPF: ABNORMAL /HPF
WBC OTHER # BLD: 13.5 K/UL (ref 4.5–11.5)

## 2024-02-02 PROCEDURE — 2500000003 HC RX 250 WO HCPCS: Performed by: NURSE ANESTHETIST, CERTIFIED REGISTERED

## 2024-02-02 PROCEDURE — 3609017100 HC EGD: Performed by: SURGERY

## 2024-02-02 PROCEDURE — 6360000004 HC RX CONTRAST MEDICATION: Performed by: RADIOLOGY

## 2024-02-02 PROCEDURE — 93005 ELECTROCARDIOGRAM TRACING: CPT | Performed by: EMERGENCY MEDICINE

## 2024-02-02 PROCEDURE — 6360000002 HC RX W HCPCS

## 2024-02-02 PROCEDURE — 6360000002 HC RX W HCPCS: Performed by: NURSE ANESTHETIST, CERTIFIED REGISTERED

## 2024-02-02 PROCEDURE — 7100000010 HC PHASE II RECOVERY - FIRST 15 MIN: Performed by: SURGERY

## 2024-02-02 PROCEDURE — 2580000003 HC RX 258

## 2024-02-02 PROCEDURE — 83690 ASSAY OF LIPASE: CPT

## 2024-02-02 PROCEDURE — 2580000003 HC RX 258: Performed by: NURSE ANESTHETIST, CERTIFIED REGISTERED

## 2024-02-02 PROCEDURE — 85025 COMPLETE CBC W/AUTO DIFF WBC: CPT

## 2024-02-02 PROCEDURE — 96374 THER/PROPH/DIAG INJ IV PUSH: CPT

## 2024-02-02 PROCEDURE — 43235 EGD DIAGNOSTIC BRUSH WASH: CPT | Performed by: SURGERY

## 2024-02-02 PROCEDURE — 96375 TX/PRO/DX INJ NEW DRUG ADDON: CPT

## 2024-02-02 PROCEDURE — 99285 EMERGENCY DEPT VISIT HI MDM: CPT

## 2024-02-02 PROCEDURE — 81001 URINALYSIS AUTO W/SCOPE: CPT

## 2024-02-02 PROCEDURE — 80053 COMPREHEN METABOLIC PANEL: CPT

## 2024-02-02 PROCEDURE — 6360000002 HC RX W HCPCS: Performed by: EMERGENCY MEDICINE

## 2024-02-02 PROCEDURE — 74177 CT ABD & PELVIS W/CONTRAST: CPT

## 2024-02-02 PROCEDURE — 7100000011 HC PHASE II RECOVERY - ADDTL 15 MIN: Performed by: SURGERY

## 2024-02-02 PROCEDURE — 96376 TX/PRO/DX INJ SAME DRUG ADON: CPT

## 2024-02-02 PROCEDURE — 3700000000 HC ANESTHESIA ATTENDED CARE: Performed by: SURGERY

## 2024-02-02 PROCEDURE — 2709999900 HC NON-CHARGEABLE SUPPLY: Performed by: SURGERY

## 2024-02-02 PROCEDURE — 93010 ELECTROCARDIOGRAM REPORT: CPT | Performed by: INTERNAL MEDICINE

## 2024-02-02 RX ORDER — ONDANSETRON 4 MG/1
4 TABLET, ORALLY DISINTEGRATING ORAL EVERY 8 HOURS PRN
Status: CANCELLED | OUTPATIENT
Start: 2024-02-02

## 2024-02-02 RX ORDER — SODIUM CHLORIDE 9 MG/ML
25 INJECTION, SOLUTION INTRAVENOUS PRN
Status: DISCONTINUED | OUTPATIENT
Start: 2024-02-02 | End: 2024-02-02 | Stop reason: HOSPADM

## 2024-02-02 RX ORDER — ONDANSETRON 2 MG/ML
4 INJECTION INTRAMUSCULAR; INTRAVENOUS EVERY 6 HOURS PRN
Status: CANCELLED | OUTPATIENT
Start: 2024-02-02

## 2024-02-02 RX ORDER — SODIUM CHLORIDE 0.9 % (FLUSH) 0.9 %
5-40 SYRINGE (ML) INJECTION PRN
Status: CANCELLED | OUTPATIENT
Start: 2024-02-02

## 2024-02-02 RX ORDER — FENTANYL CITRATE 50 UG/ML
50 INJECTION, SOLUTION INTRAMUSCULAR; INTRAVENOUS ONCE
Status: COMPLETED | OUTPATIENT
Start: 2024-02-02 | End: 2024-02-02

## 2024-02-02 RX ORDER — SODIUM CHLORIDE 0.9 % (FLUSH) 0.9 %
5-40 SYRINGE (ML) INJECTION EVERY 12 HOURS SCHEDULED
Status: DISCONTINUED | OUTPATIENT
Start: 2024-02-02 | End: 2024-02-02 | Stop reason: HOSPADM

## 2024-02-02 RX ORDER — MORPHINE SULFATE 4 MG/ML
4 INJECTION, SOLUTION INTRAMUSCULAR; INTRAVENOUS ONCE
Status: COMPLETED | OUTPATIENT
Start: 2024-02-02 | End: 2024-02-02

## 2024-02-02 RX ORDER — LIDOCAINE HYDROCHLORIDE 20 MG/ML
INJECTION, SOLUTION EPIDURAL; INFILTRATION; INTRACAUDAL; PERINEURAL PRN
Status: DISCONTINUED | OUTPATIENT
Start: 2024-02-02 | End: 2024-02-02 | Stop reason: SDUPTHER

## 2024-02-02 RX ORDER — METOCLOPRAMIDE 10 MG/1
10 TABLET ORAL
Qty: 120 TABLET | Refills: 3 | Status: SHIPPED | OUTPATIENT
Start: 2024-02-02

## 2024-02-02 RX ORDER — GLYCOPYRROLATE 0.2 MG/ML
INJECTION INTRAMUSCULAR; INTRAVENOUS PRN
Status: DISCONTINUED | OUTPATIENT
Start: 2024-02-02 | End: 2024-02-02 | Stop reason: SDUPTHER

## 2024-02-02 RX ORDER — 0.9 % SODIUM CHLORIDE 0.9 %
1000 INTRAVENOUS SOLUTION INTRAVENOUS ONCE
Status: COMPLETED | OUTPATIENT
Start: 2024-02-02 | End: 2024-02-02

## 2024-02-02 RX ORDER — SODIUM CHLORIDE 0.9 % (FLUSH) 0.9 %
5-40 SYRINGE (ML) INJECTION PRN
Status: DISCONTINUED | OUTPATIENT
Start: 2024-02-02 | End: 2024-02-02 | Stop reason: HOSPADM

## 2024-02-02 RX ORDER — SODIUM CHLORIDE 9 MG/ML
INJECTION, SOLUTION INTRAVENOUS PRN
Status: CANCELLED | OUTPATIENT
Start: 2024-02-02

## 2024-02-02 RX ORDER — SODIUM CHLORIDE 9 MG/ML
INJECTION, SOLUTION INTRAVENOUS CONTINUOUS PRN
Status: DISCONTINUED | OUTPATIENT
Start: 2024-02-02 | End: 2024-02-02 | Stop reason: SDUPTHER

## 2024-02-02 RX ORDER — SODIUM CHLORIDE 0.9 % (FLUSH) 0.9 %
5-40 SYRINGE (ML) INJECTION EVERY 12 HOURS SCHEDULED
Status: CANCELLED | OUTPATIENT
Start: 2024-02-02

## 2024-02-02 RX ORDER — PROPOFOL 10 MG/ML
INJECTION, EMULSION INTRAVENOUS PRN
Status: DISCONTINUED | OUTPATIENT
Start: 2024-02-02 | End: 2024-02-02 | Stop reason: SDUPTHER

## 2024-02-02 RX ORDER — ONDANSETRON 2 MG/ML
4 INJECTION INTRAMUSCULAR; INTRAVENOUS ONCE
Status: COMPLETED | OUTPATIENT
Start: 2024-02-02 | End: 2024-02-02

## 2024-02-02 RX ADMIN — MORPHINE SULFATE 4 MG: 4 INJECTION, SOLUTION INTRAMUSCULAR; INTRAVENOUS at 06:09

## 2024-02-02 RX ADMIN — SODIUM CHLORIDE 1000 ML: 9 INJECTION, SOLUTION INTRAVENOUS at 05:02

## 2024-02-02 RX ADMIN — SODIUM CHLORIDE: 9 INJECTION, SOLUTION INTRAVENOUS at 11:24

## 2024-02-02 RX ADMIN — MORPHINE SULFATE 4 MG: 4 INJECTION, SOLUTION INTRAMUSCULAR; INTRAVENOUS at 05:31

## 2024-02-02 RX ADMIN — IOPAMIDOL 75 ML: 755 INJECTION, SOLUTION INTRAVENOUS at 07:38

## 2024-02-02 RX ADMIN — ONDANSETRON 4 MG: 2 INJECTION INTRAMUSCULAR; INTRAVENOUS at 05:02

## 2024-02-02 RX ADMIN — PROPOFOL 260 MG: 10 INJECTION, EMULSION INTRAVENOUS at 11:28

## 2024-02-02 RX ADMIN — GLYCOPYRROLATE 0.2 MG: 0.2 INJECTION INTRAMUSCULAR; INTRAVENOUS at 11:28

## 2024-02-02 RX ADMIN — LIDOCAINE HYDROCHLORIDE 100 MG: 20 INJECTION, SOLUTION EPIDURAL; INFILTRATION; INTRACAUDAL; PERINEURAL at 11:28

## 2024-02-02 RX ADMIN — FENTANYL CITRATE 50 MCG: 50 INJECTION, SOLUTION INTRAMUSCULAR; INTRAVENOUS at 05:02

## 2024-02-02 ASSESSMENT — PAIN SCALES - GENERAL: PAINLEVEL_OUTOF10: 10

## 2024-02-02 ASSESSMENT — PAIN - FUNCTIONAL ASSESSMENT
PAIN_FUNCTIONAL_ASSESSMENT: 0-10
PAIN_FUNCTIONAL_ASSESSMENT: ACTIVITIES ARE NOT PREVENTED
PAIN_FUNCTIONAL_ASSESSMENT: 0-10
PAIN_FUNCTIONAL_ASSESSMENT: 0-10
PAIN_FUNCTIONAL_ASSESSMENT: NONE - DENIES PAIN

## 2024-02-02 ASSESSMENT — PAIN DESCRIPTION - DESCRIPTORS: DESCRIPTORS: ACHING

## 2024-02-02 ASSESSMENT — PAIN DESCRIPTION - LOCATION: LOCATION: ABDOMEN

## 2024-02-02 ASSESSMENT — LIFESTYLE VARIABLES: SMOKING_STATUS: 0

## 2024-02-02 NOTE — H&P
[facility] General Surgery Clinic Note     Assessment/Plan:        Diagnosis Orders   1. Epigastric pain  Initiate PAT Protocol     Diet NPO Exceptions are: Sips of Water with Meds     Verify informed consent     Verify pre-procedure history and physical completed     Procedure Consent     sodium chloride flush 0.9 % injection 5-40 mL     sodium chloride flush 0.9 % injection 5-40 mL     0.9 % sodium chloride infusion     Full Code          HIDA EF 77%, no stones on ultrasound  Had CT abdomen/pelvis today in ER with no acute findings  Will proceed with EGD to rule out peptic ulcer disease             Chief Complaint   Patient presents with    New Patient    Surgical Consult       Gallbladder disease, CT scan I epic,showed no stones, gallbladder distended         PCP: Janette Escalante DO     HPI: Samira Nunez is a 28 y.o. female who presents in consultation for epigastric pain. She was in the ER about a month ago with severe epigastric abdominal pain that radiated around both sides. Associated with some nausea. She had a negative workup and US showed no gallstones so she was discharged on Protonix. Since then, she has been doing better for the most part. She thinks she did have an episode that was similar but not as severe last week. No other issues.     Past Medical History        Past Medical History:   Diagnosis Date    Asthma      Chronic fatigue      Class 3 severe obesity due to excess calories with serious comorbidity and body mass index (BMI) of 50.0 to 59.9 in adult (HCC)      Elevated liver transaminase level 09/17/2021    Kidney stone      Migraine without aura 09/17/2021    Migraines      PCOS (polycystic ovarian syndrome)      Sleep apnea              Past Surgical History   No past surgical history on file.        Home Medications           Prior to Admission medications    Medication Sig Start Date End Date Taking? Authorizing Provider   pantoprazole (PROTONIX) 40 MG tablet Take 1 tablet by mouth every  take 1 tablet by mouth once daily TAKE CONTINUOUSLY SKIP PLACEBO WEEKS  Patient not taking: Reported on 12/29/2023 6/23/23     Provider, MD Daria   SYMBICORT 80-4.5 MCG/ACT AERO Inhale 2 puffs into the lungs in the morning and at bedtime  Patient not taking: Reported on 12/29/2023 8/31/23     Janette Escalante, DO                  Allergies   Allergen Reactions    Bee Venom Swelling    Seasonal         Sneezing, itchy watery eyes, congestion    Sumatriptan Other (See Comments)       Reports \"felt like head was on fire\"         Social History   Social History            Socioeconomic History    Marital status: Single       Spouse name: None    Number of children: None    Years of education: None    Highest education level: None   Tobacco Use    Smoking status: Never       Passive exposure: Past    Smokeless tobacco: Never   Vaping Use    Vaping Use: Never used   Substance and Sexual Activity    Alcohol use: Yes       Comment: occ    Drug use: Never    Sexual activity: Yes       Partners: Female      Social Determinants of Health           Financial Resource Strain: Medium Risk (5/24/2023)     Overall Financial Resource Strain (CARDIA)      Difficulty of Paying Living Expenses: Somewhat hard   Transportation Needs: Unknown (5/24/2023)     PRAPARE - Transportation      Lack of Transportation (Non-Medical): No   Housing Stability: Unknown (5/24/2023)     Housing Stability Vital Sign      Unstable Housing in the Last Year: No            Family History         Family History   Problem Relation Age of Onset    Other Mother           diverticulitis    Diabetes Mother      Migraines Mother      Cirrhosis Mother      Migraines Father      Kidney Disease Sister      Atrial Fibrillation Maternal Grandmother      Heart Attack Maternal Grandmother      Diabetes Maternal Grandmother      Heart Attack Maternal Grandfather              Review of Systems   All other systems reviewed and are negative.

## 2024-02-02 NOTE — ANESTHESIA PRE PROCEDURE
Department of Anesthesiology  Preprocedure Note       Name:  Samira Nunez   Age:  28 y.o.  :  1995                                          MRN:  [id number]         Date:  2024      Surgeon: Surgeon(s):  Trav Zaidi DO    Procedure: Procedure(s):  EGD ESOPHAGOGASTRODUODENOSCOPY    Medications prior to admission:   Prior to Admission medications    Medication Sig Start Date End Date Taking? Authorizing Provider   montelukast (SINGULAIR) 10 MG tablet Take 1 tablet by mouth nightly 24   Janette Escalante DO   albuterol sulfate HFA (VENTOLIN HFA) 108 (90 Base) MCG/ACT inhaler inhale 2 puffs by mouth and INTO THE LUNGS every 4 hours if needed for wheezing 24   Janette Escalante DO   sucralfate (CARAFATE) 1 GM tablet Take 1 tablet by mouth 4 times daily 23   Trav Zaidi DO   pantoprazole (PROTONIX) 40 MG tablet Take 1 tablet by mouth every morning (before breakfast) 23   Matheus Carter DO   drospirenone-ethinyl estradiol (KELLIE) 3-0.02 MG per tablet  23   ProviderDaria MD   spironolactone (ALDACTONE) 25 MG tablet Take 1 tablet by mouth 2 times daily 23   Daria Castro MD   PARoxetine (PAXIL) 20 MG tablet at bedtime 23   ProviderDaria MD   albuterol (ACCUNEB) 1.25 MG/3ML nebulizer solution INHALE 3 ML (1 VIAL) VIA NEBULIZER EVERY 4 TO 6 HOURS AS NEEDED 23   Janette Escalante DO   diclofenac sodium (VOLTAREN) 1 % GEL Apply 2 g topically 4 times daily 23   Janette Escalante DO   methocarbamol (ROBAXIN) 750 MG tablet Take 1 tablet by mouth 4 times daily as needed (Muscle Spasms) 23   Janette Escalante DO   fluticasone (FLONASE) 50 MCG/ACT nasal spray INSTILL TWO SPRAYS INTO EACH NOSTRIL DAILY 23   Janette Escalante DO   rizatriptan (MAXALT) 5 MG tablet Take one at migraine onset, may repeat once in 2-4 hrs. 23   Janette Escalante DO   amphetamine-dextroamphetamine (ADDERALL) 20 MG tablet Take 1 tablet by mouth 2 times daily.

## 2024-02-02 NOTE — DISCHARGE INSTRUCTIONS
Stay on a clear liquid diet until tomorrow  After that, only eat small meals that are easy to digest  You can stop the Carafate  Continue Protonix  Start a medication called Reglan    I am concerned that you have gastroparesis (slow movement of the stomach) so I am going to order a test called a gastric emptying study. You should not take the Reglan on the day of the test.    Follow up with me in 1 week in the office.

## 2024-02-02 NOTE — ED PROVIDER NOTES
[facility] EMERGENCY DEPARTMENT  EMERGENCY DEPARTMENT ENCOUNTER        Pt Name: Samira Nunez  MRN: [id number]  Birthdate 1995  Date of evaluation: 2/2/2024  Provider: Macho Sommers II, DO  PCP: Janette Escalante DO  Note Started: 4:55 AM EST 2/2/24    CHIEF COMPLAINT       Chief Complaint   Patient presents with    Abdominal Pain     Started about 2 hours ago, has an appointment for endoscope today at 10 am. Patient stated the pain got worse and couldn't wait.       HISTORY OF PRESENT ILLNESS: 1 or more Elements            28-year-old female with chronic abdominal pain presents to ER for complaint of epigastric pain that became severe this morning that woke her up from her sleep.  Patient states she is scheduled to have an EGD done by Dr. Zaidi later today.  Patient states this is the same pain that she has been having over the course of the past month.  Patient states she has not been taking any medication for this pain.  Patient denies any vomiting, states that she feels nauseous, denies any diarrhea or dysuria or constipation.  No fevers or chills, no recent cough, congestion, fevers, chills, no daily alcohol use, no NSAID use.  Patient states that she had a recent normal HIDA scan earlier in the month.    Nursing Notes were all reviewed and agreed with or any disagreements were addressed in the HPI.      REVIEW OF EXTERNAL NOTE :       Records reviewed for medical history, surgical history, medication list.      Chart Review/External Note Review    Last Echo reviewed by Me:  No results found for: \"LVEF\", \"LVEFMODE\"          Controlled Substance Monitoring:    Acute and Chronic Pain Monitoring:        No data to display                    REVIEW OF SYSTEMS :      Positives and Pertinent negatives as per HPI.     SURGICAL HISTORY   No past surgical history on file.    CURRENTMEDICATIONS       Discharge Medication List as of 2/2/2024  8:29 AM        CONTINUE these

## 2024-02-02 NOTE — ANESTHESIA POSTPROCEDURE EVALUATION
Department of Anesthesiology  Postprocedure Note    Patient: Samira Nunez  MRN: [id number]  YOB: 1995  Date of evaluation: 2/2/2024    Procedure Summary       Date: 02/02/24 Room / Location: [address]    Anesthesia Start: 1124 Anesthesia Stop: 1139    Procedure: EGD ESOPHAGOGASTRODUODENOSCOPY Diagnosis:       Epigastric pain      (Epigastric pain [R10.13])    Surgeons: Trav Zaidi DO Responsible Provider: Frances Abraham DO    Anesthesia Type: MAC ASA Status: 3            Anesthesia Type: MAC    Grecia Phase I: Grecia Score: 10    Grecia Phase II: Grecia Score: 10    Anesthesia Post Evaluation    Patient location during evaluation: PACU  Patient participation: complete - patient participated  Level of consciousness: awake and alert  Nausea & Vomiting: no vomiting and no nausea  Cardiovascular status: hemodynamically stable  Respiratory status: acceptable and spontaneous ventilation  Hydration status: stable  Pain management: adequate    No notable events documented.

## 2024-02-02 NOTE — ED NOTES
Walked patient over to out patient surgery, IV left in for procedure. Staff notified upon registration

## 2024-02-05 DIAGNOSIS — F32.A ANXIETY AND DEPRESSION: ICD-10-CM

## 2024-02-05 DIAGNOSIS — R73.03 PREDIABETES: ICD-10-CM

## 2024-02-05 DIAGNOSIS — R73.9 HYPERGLYCEMIA: Primary | ICD-10-CM

## 2024-02-05 DIAGNOSIS — F90.9 ATTENTION DEFICIT HYPERACTIVITY DISORDER (ADHD), UNSPECIFIED ADHD TYPE: ICD-10-CM

## 2024-02-05 DIAGNOSIS — F41.9 ANXIETY AND DEPRESSION: ICD-10-CM

## 2024-02-05 RX ORDER — PAROXETINE HYDROCHLORIDE 20 MG/1
TABLET, FILM COATED ORAL
Qty: 30 TABLET | Refills: 0 | Status: SHIPPED | OUTPATIENT
Start: 2024-02-05

## 2024-02-05 RX ORDER — DEXTROAMPHETAMINE SACCHARATE, AMPHETAMINE ASPARTATE, DEXTROAMPHETAMINE SULFATE AND AMPHETAMINE SULFATE 5; 5; 5; 5 MG/1; MG/1; MG/1; MG/1
1 TABLET ORAL 2 TIMES DAILY
Qty: 60 TABLET | Refills: 0 | Status: SHIPPED | OUTPATIENT
Start: 2024-02-05 | End: 2024-03-06

## 2024-02-09 ENCOUNTER — HOSPITAL ENCOUNTER (OUTPATIENT)
Dept: NUCLEAR MEDICINE | Age: 29
End: 2024-02-09
Attending: SURGERY
Payer: MEDICAID

## 2024-02-09 DIAGNOSIS — R10.13 EPIGASTRIC PAIN: ICD-10-CM

## 2024-02-09 PROCEDURE — 3430000000 HC RX DIAGNOSTIC RADIOPHARMACEUTICAL: Performed by: RADIOLOGY

## 2024-02-09 PROCEDURE — A9541 TC99M SULFUR COLLOID: HCPCS | Performed by: RADIOLOGY

## 2024-02-09 PROCEDURE — 78264 GASTRIC EMPTYING IMG STUDY: CPT

## 2024-02-12 PROCEDURE — 3430000000 HC RX DIAGNOSTIC RADIOPHARMACEUTICAL: Performed by: RADIOLOGY

## 2024-02-12 PROCEDURE — A9541 TC99M SULFUR COLLOID: HCPCS | Performed by: RADIOLOGY

## 2024-02-12 RX ADMIN — Medication 2 MILLICURIE: at 10:41

## 2024-02-14 ENCOUNTER — HOSPITAL ENCOUNTER (OUTPATIENT)
Dept: GENERAL RADIOLOGY | Age: 29
Discharge: HOME | End: 2024-02-16
Payer: MEDICAID

## 2024-02-14 ENCOUNTER — HOSPITAL ENCOUNTER (OUTPATIENT)
Age: 29
Discharge: HOME | End: 2024-02-16
Payer: MEDICAID

## 2024-02-14 ENCOUNTER — HOSPITAL ENCOUNTER (OUTPATIENT)
Age: 29
Discharge: HOME | End: 2024-02-14
Payer: MEDICAID

## 2024-02-14 DIAGNOSIS — M25.512 CHRONIC PAIN OF BOTH SHOULDERS: ICD-10-CM

## 2024-02-14 DIAGNOSIS — R73.9 HYPERGLYCEMIA: ICD-10-CM

## 2024-02-14 DIAGNOSIS — M54.2 NECK PAIN: ICD-10-CM

## 2024-02-14 DIAGNOSIS — G89.29 CHRONIC PAIN OF BOTH SHOULDERS: ICD-10-CM

## 2024-02-14 DIAGNOSIS — M25.511 CHRONIC RIGHT SHOULDER PAIN: ICD-10-CM

## 2024-02-14 DIAGNOSIS — M25.511 CHRONIC PAIN OF BOTH SHOULDERS: ICD-10-CM

## 2024-02-14 DIAGNOSIS — G89.29 CHRONIC MIDLINE THORACIC BACK PAIN: ICD-10-CM

## 2024-02-14 DIAGNOSIS — G89.29 CHRONIC RIGHT SHOULDER PAIN: ICD-10-CM

## 2024-02-14 DIAGNOSIS — M54.6 CHRONIC MIDLINE THORACIC BACK PAIN: ICD-10-CM

## 2024-02-14 DIAGNOSIS — R73.03 PREDIABETES: ICD-10-CM

## 2024-02-14 LAB — HBA1C MFR BLD: 6.3 % (ref 4–5.6)

## 2024-02-14 PROCEDURE — 72050 X-RAY EXAM NECK SPINE 4/5VWS: CPT

## 2024-02-14 PROCEDURE — 73030 X-RAY EXAM OF SHOULDER: CPT

## 2024-02-14 PROCEDURE — 72072 X-RAY EXAM THORAC SPINE 3VWS: CPT

## 2024-02-14 PROCEDURE — 83036 HEMOGLOBIN GLYCOSYLATED A1C: CPT

## 2024-02-16 ENCOUNTER — TELEPHONE (OUTPATIENT)
Dept: SURGERY | Age: 29
End: 2024-02-16

## 2024-02-16 ENCOUNTER — OFFICE VISIT (OUTPATIENT)
Dept: SURGERY | Age: 29
End: 2024-02-16
Payer: MEDICAID

## 2024-02-16 VITALS
WEIGHT: 252.6 LBS | HEIGHT: 65 IN | HEART RATE: 87 BPM | TEMPERATURE: 97 F | DIASTOLIC BLOOD PRESSURE: 74 MMHG | BODY MASS INDEX: 42.09 KG/M2 | SYSTOLIC BLOOD PRESSURE: 121 MMHG | OXYGEN SATURATION: 98 %

## 2024-02-16 DIAGNOSIS — R10.13 EPIGASTRIC PAIN: Primary | ICD-10-CM

## 2024-02-16 PROCEDURE — 99213 OFFICE O/P EST LOW 20 MIN: CPT | Performed by: SURGERY

## 2024-02-16 PROCEDURE — G8427 DOCREV CUR MEDS BY ELIG CLIN: HCPCS | Performed by: SURGERY

## 2024-02-16 PROCEDURE — 1036F TOBACCO NON-USER: CPT | Performed by: SURGERY

## 2024-02-16 PROCEDURE — G8482 FLU IMMUNIZE ORDER/ADMIN: HCPCS | Performed by: SURGERY

## 2024-02-16 PROCEDURE — G8417 CALC BMI ABV UP PARAM F/U: HCPCS | Performed by: SURGERY

## 2024-02-16 RX ORDER — SODIUM CHLORIDE 0.9 % (FLUSH) 0.9 %
5-40 SYRINGE (ML) INJECTION EVERY 12 HOURS SCHEDULED
OUTPATIENT
Start: 2024-02-16

## 2024-02-16 RX ORDER — SODIUM CHLORIDE 9 MG/ML
25 INJECTION, SOLUTION INTRAVENOUS PRN
OUTPATIENT
Start: 2024-02-16

## 2024-02-16 RX ORDER — SODIUM CHLORIDE 0.9 % (FLUSH) 0.9 %
5-40 SYRINGE (ML) INJECTION PRN
OUTPATIENT
Start: 2024-02-16

## 2024-02-16 NOTE — PROGRESS NOTES
[facility] Surgery Clinic Note    Assessment/Plan:     Diagnosis Orders   1. Epigastric pain  Initiate PAT Protocol    Diet NPO Exceptions are: Sips of Water with Meds    Verify informed consent    Verify pre-procedure history and physical completed    Procedure Consent    sodium chloride flush 0.9 % injection 5-40 mL    sodium chloride flush 0.9 % injection 5-40 mL    0.9 % sodium chloride infusion    Full Code          EGD was non-diagnostic last time because of retained food and inability to visualize mucosa or intubate duodenum.  Gastric emptying study normal.  Offered cholecystectomy for borderline high EF% versus repeat EGD and she would rather start with repeat EGD.      Chief Complaint   Patient presents with    Follow-up     Egd and gastric emptying       PCP: Janette Escalante DO    HPI: Samira Nunez is a 28 y.o. female with postprandial epigastric pain. Her HIDA scan showed an EF of 77%. I did an EGD for her and she had retained food in the stomach precluding proper visualization and pyloric intubation. She had not eaten since 6PM the previous evening so I thought that maybe her issue was gastroparesis. Her gastric emptying study came back normal. She is still having symptoms with certain foods.    Review of Systems   All other systems reviewed and are negative.        Past Medical History:   Diagnosis Date    Asthma     Chronic fatigue     Class 3 severe obesity due to excess calories with serious comorbidity and body mass index (BMI) of 50.0 to 59.9 in adult (HCC)     COVID-19 12/01/2023    Elevated liver transaminase level 09/17/2021    Kidney stone     Migraine without aura 09/17/2021    Migraines     PCOS (polycystic ovarian syndrome)     Sleep apnea        Past Surgical History:   Procedure Laterality Date    UPPER GASTROINTESTINAL ENDOSCOPY N/A 2/2/2024    EGD ESOPHAGOGASTRODUODENOSCOPY performed by Trav Zaidi DO at [facility] ENDOSCOPY       Family History   Problem Relation Age of Onset

## 2024-02-16 NOTE — H&P (VIEW-ONLY)
[facility] Surgery Clinic Note    Assessment/Plan:     Diagnosis Orders   1. Epigastric pain  Initiate PAT Protocol    Diet NPO Exceptions are: Sips of Water with Meds    Verify informed consent    Verify pre-procedure history and physical completed    Procedure Consent    sodium chloride flush 0.9 % injection 5-40 mL    sodium chloride flush 0.9 % injection 5-40 mL    0.9 % sodium chloride infusion    Full Code          EGD was non-diagnostic last time because of retained food and inability to visualize mucosa or intubate duodenum.  Gastric emptying study normal.  Offered cholecystectomy for borderline high EF% versus repeat EGD and she would rather start with repeat EGD.      Chief Complaint   Patient presents with    Follow-up     Egd and gastric emptying       PCP: Janette Escalante DO    HPI: Samira Nunez is a 28 y.o. female with postprandial epigastric pain. Her HIDA scan showed an EF of 77%. I did an EGD for her and she had retained food in the stomach precluding proper visualization and pyloric intubation. She had not eaten since 6PM the previous evening so I thought that maybe her issue was gastroparesis. Her gastric emptying study came back normal. She is still having symptoms with certain foods.    Review of Systems   All other systems reviewed and are negative.        Past Medical History:   Diagnosis Date    Asthma     Chronic fatigue     Class 3 severe obesity due to excess calories with serious comorbidity and body mass index (BMI) of 50.0 to 59.9 in adult (HCC)     COVID-19 12/01/2023    Elevated liver transaminase level 09/17/2021    Kidney stone     Migraine without aura 09/17/2021    Migraines     PCOS (polycystic ovarian syndrome)     Sleep apnea        Past Surgical History:   Procedure Laterality Date    UPPER GASTROINTESTINAL ENDOSCOPY N/A 2/2/2024    EGD ESOPHAGOGASTRODUODENOSCOPY performed by Trav Zaidi DO at [facility] ENDOSCOPY       Family History   Problem Relation Age of Onset

## 2024-02-16 NOTE — H&P (VIEW-ONLY)
[facility] Surgery Clinic Note    Assessment/Plan:     Diagnosis Orders   1. Epigastric pain  Initiate PAT Protocol    Diet NPO Exceptions are: Sips of Water with Meds    Verify informed consent    Verify pre-procedure history and physical completed    Procedure Consent    sodium chloride flush 0.9 % injection 5-40 mL    sodium chloride flush 0.9 % injection 5-40 mL    0.9 % sodium chloride infusion    Full Code          EGD was non-diagnostic last time because of retained food and inability to visualize mucosa or intubate duodenum.  Gastric emptying study normal.  Offered cholecystectomy for borderline high EF% versus repeat EGD and she would rather start with repeat EGD.      Chief Complaint   Patient presents with    Follow-up     Egd and gastric emptying       PCP: Janette Escalante DO    HPI: Samira Nunez is a 28 y.o. female with postprandial epigastric pain. Her HIDA scan showed an EF of 77%. I did an EGD for her and she had retained food in the stomach precluding proper visualization and pyloric intubation. She had not eaten since 6PM the previous evening so I thought that maybe her issue was gastroparesis. Her gastric emptying study came back normal. She is still having symptoms with certain foods.    Review of Systems   All other systems reviewed and are negative.        Past Medical History:   Diagnosis Date    Asthma     Chronic fatigue     Class 3 severe obesity due to excess calories with serious comorbidity and body mass index (BMI) of 50.0 to 59.9 in adult (HCC)     COVID-19 12/01/2023    Elevated liver transaminase level 09/17/2021    Kidney stone     Migraine without aura 09/17/2021    Migraines     PCOS (polycystic ovarian syndrome)     Sleep apnea        Past Surgical History:   Procedure Laterality Date    UPPER GASTROINTESTINAL ENDOSCOPY N/A 2/2/2024    EGD ESOPHAGOGASTRODUODENOSCOPY performed by Trav Zaidi DO at [facility] ENDOSCOPY       Family History   Problem Relation Age of Onset

## 2024-02-16 NOTE — TELEPHONE ENCOUNTER
[facility] Patient needs to report at the front entrance 1 hour prior to the procedure, no ASA products for 7 days. Attempted to call the patient. No answer, left message on the phone. Instruction letter mailed. Encouraged patient to call our office if any questions.     Electronically signed by Olivia Menezes on 2/16/2024 at 3:50 PM

## 2024-02-16 NOTE — TELEPHONE ENCOUNTER
Prior Authorization Form:      DEMOGRAPHICS:                     Patient Name:  Rain Colon  Patient :  1995            Insurance:  Payor: [employer] [place] / Plan: [employer] [place] / Product Type: *No Product type* /   Insurance ID Number:    Payer/Plan Subscr  Sex Relation Sub. Ins. ID Effective Group Num   1. [facility]* RANI COLON 1995 Female Self [id number]                                   [address]         DIAGNOSIS & PROCEDURE:                       Procedure/Operation: EGD            CPT Code: 41709    Diagnosis:  epigastric pain     ICD10 Code: R10.13    Location:  [place]    Surgeon:  Dr. Zaidi     SCHEDULING INFORMATION:                          Date: 3/1/24    Time: 11:30am              Anesthesia:  MAC/TIVA                                                       Status:  Outpatient        Special Comments:  N/A       Electronically signed by Olivia Menezes on 2024 at 3:15 PM

## 2024-02-20 ENCOUNTER — APPOINTMENT (OUTPATIENT)
Dept: CT IMAGING | Age: 29
End: 2024-02-20
Payer: MEDICAID

## 2024-02-20 ENCOUNTER — HOSPITAL ENCOUNTER (EMERGENCY)
Age: 29
Discharge: HOME | End: 2024-02-20
Attending: STUDENT IN AN ORGANIZED HEALTH CARE EDUCATION/TRAINING PROGRAM
Payer: MEDICAID

## 2024-02-20 VITALS
WEIGHT: 246 LBS | TEMPERATURE: 97.9 F | HEART RATE: 85 BPM | BODY MASS INDEX: 40.98 KG/M2 | DIASTOLIC BLOOD PRESSURE: 76 MMHG | HEIGHT: 65 IN | OXYGEN SATURATION: 98 % | RESPIRATION RATE: 16 BRPM | SYSTOLIC BLOOD PRESSURE: 125 MMHG

## 2024-02-20 DIAGNOSIS — K29.00 ACUTE GASTRITIS WITHOUT HEMORRHAGE, UNSPECIFIED GASTRITIS TYPE: Primary | ICD-10-CM

## 2024-02-20 DIAGNOSIS — R10.13 EPIGASTRIC PAIN: ICD-10-CM

## 2024-02-20 LAB
ALBUMIN SERPL-MCNC: 4.3 G/DL (ref 3.5–5.2)
ALP SERPL-CCNC: 76 U/L (ref 35–104)
ALT SERPL-CCNC: 29 U/L (ref 0–32)
ANION GAP SERPL CALCULATED.3IONS-SCNC: 14 MMOL/L (ref 7–16)
AST SERPL-CCNC: 53 U/L (ref 0–31)
BACTERIA URNS QL MICRO: ABNORMAL
BASOPHILS # BLD: 0.04 K/UL (ref 0–0.2)
BASOPHILS NFR BLD: 0 % (ref 0–2)
BILIRUB SERPL-MCNC: 0.4 MG/DL (ref 0–1.2)
BILIRUB UR QL STRIP: NEGATIVE
BUN SERPL-MCNC: 12 MG/DL (ref 6–20)
CALCIUM SERPL-MCNC: 10.3 MG/DL (ref 8.6–10.2)
CHLORIDE SERPL-SCNC: 103 MMOL/L (ref 98–107)
CLARITY UR: CLEAR
CO2 SERPL-SCNC: 20 MMOL/L (ref 22–29)
COLOR UR: YELLOW
CREAT SERPL-MCNC: 0.7 MG/DL (ref 0.5–1)
EKG ATRIAL RATE: 84 BPM
EKG P AXIS: 59 DEGREES
EKG P-R INTERVAL: 126 MS
EKG Q-T INTERVAL: 366 MS
EKG QRS DURATION: 68 MS
EKG QTC CALCULATION (BAZETT): 432 MS
EKG R AXIS: 33 DEGREES
EKG T AXIS: 32 DEGREES
EKG VENTRICULAR RATE: 84 BPM
EOSINOPHIL # BLD: 0.07 K/UL (ref 0.05–0.5)
EOSINOPHILS RELATIVE PERCENT: 1 % (ref 0–6)
ERYTHROCYTE [DISTWIDTH] IN BLOOD BY AUTOMATED COUNT: 12.7 % (ref 11.5–15)
GFR SERPL CREATININE-BSD FRML MDRD: >60 ML/MIN/1.73M2
GLUCOSE SERPL-MCNC: 133 MG/DL (ref 74–99)
GLUCOSE UR STRIP-MCNC: NEGATIVE MG/DL
HCG, URINE, POC: NEGATIVE
HCT VFR BLD AUTO: 42.7 % (ref 34–48)
HGB BLD-MCNC: 14 G/DL (ref 11.5–15.5)
HGB UR QL STRIP.AUTO: ABNORMAL
IMM GRANULOCYTES # BLD AUTO: 0.09 K/UL (ref 0–0.58)
IMM GRANULOCYTES NFR BLD: 1 % (ref 0–5)
KETONES UR STRIP-MCNC: NEGATIVE MG/DL
LACTATE BLDV-SCNC: 2.1 MMOL/L (ref 0.5–2.2)
LACTATE BLDV-SCNC: 3 MMOL/L (ref 0.5–2.2)
LEUKOCYTE ESTERASE UR QL STRIP: ABNORMAL
LIPASE SERPL-CCNC: 34 U/L (ref 13–60)
LYMPHOCYTES NFR BLD: 1.82 K/UL (ref 1.5–4)
LYMPHOCYTES RELATIVE PERCENT: 14 % (ref 20–42)
Lab: NORMAL
MCH RBC QN AUTO: 28.2 PG (ref 26–35)
MCHC RBC AUTO-ENTMCNC: 32.8 G/DL (ref 32–34.5)
MCV RBC AUTO: 86.1 FL (ref 80–99.9)
MONOCYTES NFR BLD: 0.78 K/UL (ref 0.1–0.95)
MONOCYTES NFR BLD: 6 % (ref 2–12)
NEGATIVE QC PASS/FAIL: NORMAL
NEUTROPHILS NFR BLD: 79 % (ref 43–80)
NEUTS SEG NFR BLD: 10.29 K/UL (ref 1.8–7.3)
NITRITE UR QL STRIP: NEGATIVE
PH UR STRIP: 6.5 [PH] (ref 5–9)
PLATELET # BLD AUTO: 446 K/UL (ref 130–450)
PMV BLD AUTO: 9.9 FL (ref 7–12)
POSITIVE QC PASS/FAIL: NORMAL
POTASSIUM SERPL-SCNC: 4.5 MMOL/L (ref 3.5–5)
PROT SERPL-MCNC: 7.6 G/DL (ref 6.4–8.3)
PROT UR STRIP-MCNC: NEGATIVE MG/DL
RBC # BLD AUTO: 4.96 M/UL (ref 3.5–5.5)
RBC #/AREA URNS HPF: ABNORMAL /HPF
SODIUM SERPL-SCNC: 137 MMOL/L (ref 132–146)
SP GR UR STRIP: 1.02 (ref 1–1.03)
TROPONIN I SERPL HS-MCNC: <6 NG/L (ref 0–9)
UROBILINOGEN UR STRIP-ACNC: 0.2 EU/DL (ref 0–1)
WBC #/AREA URNS HPF: ABNORMAL /HPF
WBC OTHER # BLD: 13.1 K/UL (ref 4.5–11.5)

## 2024-02-20 PROCEDURE — 2580000003 HC RX 258: Performed by: STUDENT IN AN ORGANIZED HEALTH CARE EDUCATION/TRAINING PROGRAM

## 2024-02-20 PROCEDURE — 74177 CT ABD & PELVIS W/CONTRAST: CPT

## 2024-02-20 PROCEDURE — 81001 URINALYSIS AUTO W/SCOPE: CPT

## 2024-02-20 PROCEDURE — 93010 ELECTROCARDIOGRAM REPORT: CPT | Performed by: INTERNAL MEDICINE

## 2024-02-20 PROCEDURE — 96375 TX/PRO/DX INJ NEW DRUG ADDON: CPT

## 2024-02-20 PROCEDURE — 83605 ASSAY OF LACTIC ACID: CPT

## 2024-02-20 PROCEDURE — 96361 HYDRATE IV INFUSION ADD-ON: CPT

## 2024-02-20 PROCEDURE — 6360000002 HC RX W HCPCS: Performed by: STUDENT IN AN ORGANIZED HEALTH CARE EDUCATION/TRAINING PROGRAM

## 2024-02-20 PROCEDURE — 6360000004 HC RX CONTRAST MEDICATION: Performed by: RADIOLOGY

## 2024-02-20 PROCEDURE — 99285 EMERGENCY DEPT VISIT HI MDM: CPT

## 2024-02-20 PROCEDURE — 93005 ELECTROCARDIOGRAM TRACING: CPT | Performed by: STUDENT IN AN ORGANIZED HEALTH CARE EDUCATION/TRAINING PROGRAM

## 2024-02-20 PROCEDURE — 87086 URINE CULTURE/COLONY COUNT: CPT

## 2024-02-20 PROCEDURE — 85025 COMPLETE CBC W/AUTO DIFF WBC: CPT

## 2024-02-20 PROCEDURE — 96374 THER/PROPH/DIAG INJ IV PUSH: CPT

## 2024-02-20 PROCEDURE — 84484 ASSAY OF TROPONIN QUANT: CPT

## 2024-02-20 PROCEDURE — 80053 COMPREHEN METABOLIC PANEL: CPT

## 2024-02-20 PROCEDURE — 96372 THER/PROPH/DIAG INJ SC/IM: CPT

## 2024-02-20 PROCEDURE — 6370000000 HC RX 637 (ALT 250 FOR IP): Performed by: STUDENT IN AN ORGANIZED HEALTH CARE EDUCATION/TRAINING PROGRAM

## 2024-02-20 PROCEDURE — 83690 ASSAY OF LIPASE: CPT

## 2024-02-20 RX ORDER — 0.9 % SODIUM CHLORIDE 0.9 %
1000 INTRAVENOUS SOLUTION INTRAVENOUS ONCE
Status: COMPLETED | OUTPATIENT
Start: 2024-02-20 | End: 2024-02-20

## 2024-02-20 RX ORDER — FAMOTIDINE 20 MG/1
20 TABLET, FILM COATED ORAL DAILY
Qty: 14 TABLET | Refills: 0 | Status: SHIPPED | OUTPATIENT
Start: 2024-02-20 | End: 2024-02-22 | Stop reason: ALTCHOICE

## 2024-02-20 RX ORDER — MORPHINE SULFATE 4 MG/ML
4 INJECTION, SOLUTION INTRAMUSCULAR; INTRAVENOUS ONCE
Status: DISCONTINUED | OUTPATIENT
Start: 2024-02-20 | End: 2024-02-20 | Stop reason: HOSPADM

## 2024-02-20 RX ORDER — DICYCLOMINE HYDROCHLORIDE 10 MG/ML
20 INJECTION INTRAMUSCULAR ONCE
Status: COMPLETED | OUTPATIENT
Start: 2024-02-20 | End: 2024-02-20

## 2024-02-20 RX ORDER — ONDANSETRON 2 MG/ML
4 INJECTION INTRAMUSCULAR; INTRAVENOUS ONCE
Status: COMPLETED | OUTPATIENT
Start: 2024-02-20 | End: 2024-02-20

## 2024-02-20 RX ORDER — SUCRALFATE 1 G/1
1 TABLET ORAL ONCE
Status: COMPLETED | OUTPATIENT
Start: 2024-02-20 | End: 2024-02-20

## 2024-02-20 RX ORDER — KETOROLAC TROMETHAMINE 30 MG/ML
15 INJECTION, SOLUTION INTRAMUSCULAR; INTRAVENOUS ONCE
Status: COMPLETED | OUTPATIENT
Start: 2024-02-20 | End: 2024-02-20

## 2024-02-20 RX ORDER — HYDROCODONE BITARTRATE AND ACETAMINOPHEN 5; 325 MG/1; MG/1
1 TABLET ORAL ONCE
Status: COMPLETED | OUTPATIENT
Start: 2024-02-20 | End: 2024-02-20

## 2024-02-20 RX ORDER — SUCRALFATE 1 G/1
1 TABLET ORAL 4 TIMES DAILY
Qty: 40 TABLET | Refills: 0 | Status: SHIPPED | OUTPATIENT
Start: 2024-02-20 | End: 2024-03-01

## 2024-02-20 RX ORDER — DICYCLOMINE HYDROCHLORIDE 10 MG/1
10 CAPSULE ORAL
Qty: 20 CAPSULE | Refills: 0 | Status: SHIPPED | OUTPATIENT
Start: 2024-02-20 | End: 2024-02-25

## 2024-02-20 RX ADMIN — SODIUM CHLORIDE 1000 ML: 9 INJECTION, SOLUTION INTRAVENOUS at 11:08

## 2024-02-20 RX ADMIN — IOPAMIDOL 75 ML: 755 INJECTION, SOLUTION INTRAVENOUS at 11:48

## 2024-02-20 RX ADMIN — SUCRALFATE 1 G: 1 TABLET ORAL at 15:04

## 2024-02-20 RX ADMIN — ONDANSETRON 4 MG: 2 INJECTION INTRAMUSCULAR; INTRAVENOUS at 10:06

## 2024-02-20 RX ADMIN — HYDROCODONE BITARTRATE AND ACETAMINOPHEN 1 TABLET: 5; 325 TABLET ORAL at 10:17

## 2024-02-20 RX ADMIN — SODIUM CHLORIDE 1000 ML: 9 INJECTION, SOLUTION INTRAVENOUS at 10:03

## 2024-02-20 RX ADMIN — KETOROLAC TROMETHAMINE 15 MG: 30 INJECTION INTRAMUSCULAR; INTRAVENOUS at 13:59

## 2024-02-20 RX ADMIN — LIDOCAINE HYDROCHLORIDE: 20 SOLUTION ORAL; TOPICAL at 15:05

## 2024-02-20 RX ADMIN — DICYCLOMINE HYDROCHLORIDE 20 MG: 10 INJECTION, SOLUTION INTRAMUSCULAR at 13:59

## 2024-02-20 ASSESSMENT — ENCOUNTER SYMPTOMS
ABDOMINAL PAIN: 1
VOMITING: 0
CHEST TIGHTNESS: 0
COUGH: 0
PHOTOPHOBIA: 0
DIARRHEA: 1
NAUSEA: 1
SHORTNESS OF BREATH: 0
ABDOMINAL DISTENTION: 0

## 2024-02-20 ASSESSMENT — PAIN SCALES - GENERAL
PAINLEVEL_OUTOF10: 8
PAINLEVEL_OUTOF10: 5

## 2024-02-20 NOTE — ED PROVIDER NOTES
Microscopic   Result Value Ref Range    Color, UA Yellow Yellow    Turbidity UA Clear Clear    Glucose, Ur NEGATIVE NEGATIVE mg/dL    Bilirubin Urine NEGATIVE NEGATIVE    Ketones, Urine NEGATIVE NEGATIVE mg/dL    Specific Gravity, UA 1.025 1.005 - 1.030    Urine Hgb TRACE (A) NEGATIVE    pH, UA 6.5 5.0 - 9.0    Protein, UA NEGATIVE NEGATIVE mg/dL    Urobilinogen, Urine 0.2 0.0 - 1.0 EU/dL    Nitrite, Urine NEGATIVE NEGATIVE    Leukocyte Esterase, Urine SMALL (A) NEGATIVE    WBC, UA 6 TO 9 (A) 0 TO 5 /HPF    RBC, UA 3 to 5 (A) 0 TO 2 /HPF    Bacteria, UA 2+ (A) None   Lactic Acid   Result Value Ref Range    Lactic Acid 2.1 0.5 - 2.2 mmol/L   POC Pregnancy Urine Qual   Result Value Ref Range    HCG, Urine, POC Negative Negative    Lot Number [id number]     Positive QC Pass/Fail Pass     Negative QC Pass/Fail Pass    EKG 12 Lead   Result Value Ref Range    Ventricular Rate 84 BPM    Atrial Rate 84 BPM    P-R Interval 126 ms    QRS Duration 68 ms    Q-T Interval 366 ms    QTc Calculation (Bazett) 432 ms    P Axis 59 degrees    R Axis 33 degrees    T Axis 32 degrees       Radiology:  CT ABDOMEN PELVIS W IV CONTRAST Additional Contrast? None   Final Result   1. No acute intra-abdominal or intrapelvic abnormality.             ------------------------- NURSING NOTES AND VITALS REVIEWED ---------------------------  Date / Time Roomed:  2/20/2024  9:12 AM  ED Bed Assignment:  33/33    The nursing notes within the ED encounter and vital signs as below have been reviewed.   /76   Pulse 85   Temp 97.9 °F (36.6 °C) (Oral)   Resp 16   Ht 1.651 m (5' 5\")   Wt 111.6 kg (246 lb)   LMP 01/28/2024   SpO2 98%   BMI 40.94 kg/m²   Oxygen Saturation Interpretation: Normal      ------------------------------------------ PROGRESS NOTES ------------------------------------------  6:50 AM EST  I have spoken with the patient and discussed today’s results, in addition to providing specific details for the plan of care and counseling

## 2024-02-21 ENCOUNTER — TELEPHONE (OUTPATIENT)
Dept: SURGERY | Age: 29
End: 2024-02-21

## 2024-02-21 LAB
MICROORGANISM SPEC CULT: NO GROWTH
SPECIMEN DESCRIPTION: NORMAL

## 2024-02-21 NOTE — TELEPHONE ENCOUNTER
Received a call from the patient who stated that the patient went to make an appt with GI doctor. She would like to cancel the appt for today and cancel EGD. MA then cancelled the EGD.  Electronically signed by Olivia Menezes on 2/21/2024 at 12:46 PM

## 2024-02-22 ENCOUNTER — APPOINTMENT (OUTPATIENT)
Dept: CT IMAGING | Age: 29
End: 2024-02-22
Payer: MEDICAID

## 2024-02-22 ENCOUNTER — TELEPHONE (OUTPATIENT)
Dept: FAMILY MEDICINE CLINIC | Age: 29
End: 2024-02-22

## 2024-02-22 ENCOUNTER — HOSPITAL ENCOUNTER (EMERGENCY)
Age: 29
Discharge: HOME | End: 2024-02-22
Attending: EMERGENCY MEDICINE
Payer: MEDICAID

## 2024-02-22 VITALS
OXYGEN SATURATION: 97 % | DIASTOLIC BLOOD PRESSURE: 90 MMHG | RESPIRATION RATE: 20 BRPM | TEMPERATURE: 98.3 F | HEART RATE: 86 BPM | SYSTOLIC BLOOD PRESSURE: 163 MMHG

## 2024-02-22 DIAGNOSIS — E86.0 DEHYDRATION: ICD-10-CM

## 2024-02-22 DIAGNOSIS — D50.9 IRON DEFICIENCY ANEMIA, UNSPECIFIED IRON DEFICIENCY ANEMIA TYPE: ICD-10-CM

## 2024-02-22 DIAGNOSIS — R10.9 ABDOMINAL PAIN, UNSPECIFIED ABDOMINAL LOCATION: Primary | ICD-10-CM

## 2024-02-22 DIAGNOSIS — R74.01 TRANSAMINITIS: Primary | ICD-10-CM

## 2024-02-22 LAB
ALBUMIN SERPL-MCNC: 4.2 G/DL (ref 3.5–5.2)
ALP SERPL-CCNC: 108 U/L (ref 35–104)
ALT SERPL-CCNC: 120 U/L (ref 0–32)
AMYLASE SERPL-CCNC: 42 U/L (ref 20–100)
ANION GAP SERPL CALCULATED.3IONS-SCNC: 16 MMOL/L (ref 7–16)
AST SERPL-CCNC: 170 U/L (ref 0–31)
BACTERIA URNS QL MICRO: ABNORMAL
BILIRUB SERPL-MCNC: 0.5 MG/DL (ref 0–1.2)
BILIRUB UR QL STRIP: ABNORMAL
BUN SERPL-MCNC: 8 MG/DL (ref 6–20)
CALCIUM SERPL-MCNC: 9.7 MG/DL (ref 8.6–10.2)
CHLORIDE SERPL-SCNC: 104 MMOL/L (ref 98–107)
CLARITY UR: CLEAR
CO2 SERPL-SCNC: 20 MMOL/L (ref 22–29)
COLOR UR: ABNORMAL
CREAT SERPL-MCNC: 0.7 MG/DL (ref 0.5–1)
EPI CELLS #/AREA URNS HPF: ABNORMAL /HPF
ERYTHROCYTE [DISTWIDTH] IN BLOOD BY AUTOMATED COUNT: 12.9 % (ref 11.5–15)
GFR SERPL CREATININE-BSD FRML MDRD: >60 ML/MIN/1.73M2
GLUCOSE SERPL-MCNC: 185 MG/DL (ref 74–99)
GLUCOSE UR STRIP-MCNC: NEGATIVE MG/DL
HCG SERPL QL: NEGATIVE
HCG UR QL: NEGATIVE
HCT VFR BLD AUTO: 40.3 % (ref 34–48)
HGB BLD-MCNC: 13.6 G/DL (ref 11.5–15.5)
HGB UR QL STRIP.AUTO: ABNORMAL
KETONES UR STRIP-MCNC: ABNORMAL MG/DL
LACTATE BLDV-SCNC: 2.4 MMOL/L (ref 0.5–2.2)
LACTATE BLDV-SCNC: 3.2 MMOL/L (ref 0.5–2.2)
LEUKOCYTE ESTERASE UR QL STRIP: NEGATIVE
LIPASE SERPL-CCNC: 34 U/L (ref 13–60)
MAGNESIUM SERPL-MCNC: 1.8 MG/DL (ref 1.6–2.6)
MCH RBC QN AUTO: 27.9 PG (ref 26–35)
MCHC RBC AUTO-ENTMCNC: 33.7 G/DL (ref 32–34.5)
MCV RBC AUTO: 82.8 FL (ref 80–99.9)
NITRITE UR QL STRIP: NEGATIVE
PH UR STRIP: 6 [PH] (ref 5–9)
PLATELET # BLD AUTO: 475 K/UL (ref 130–450)
PMV BLD AUTO: 9.6 FL (ref 7–12)
POTASSIUM SERPL-SCNC: 4 MMOL/L (ref 3.5–5)
PROT SERPL-MCNC: 7.4 G/DL (ref 6.4–8.3)
PROT UR STRIP-MCNC: NEGATIVE MG/DL
RBC # BLD AUTO: 4.87 M/UL (ref 3.5–5.5)
RBC #/AREA URNS HPF: ABNORMAL /HPF
SODIUM SERPL-SCNC: 140 MMOL/L (ref 132–146)
SP GR UR STRIP: >1.03 (ref 1–1.03)
UROBILINOGEN UR STRIP-ACNC: 1 EU/DL (ref 0–1)
WBC #/AREA URNS HPF: ABNORMAL /HPF
WBC OTHER # BLD: 10 K/UL (ref 4.5–11.5)

## 2024-02-22 PROCEDURE — 83605 ASSAY OF LACTIC ACID: CPT

## 2024-02-22 PROCEDURE — 83690 ASSAY OF LIPASE: CPT

## 2024-02-22 PROCEDURE — 71275 CT ANGIOGRAPHY CHEST: CPT

## 2024-02-22 PROCEDURE — 82150 ASSAY OF AMYLASE: CPT

## 2024-02-22 PROCEDURE — 6360000004 HC RX CONTRAST MEDICATION: Performed by: STUDENT IN AN ORGANIZED HEALTH CARE EDUCATION/TRAINING PROGRAM

## 2024-02-22 PROCEDURE — 96361 HYDRATE IV INFUSION ADD-ON: CPT

## 2024-02-22 PROCEDURE — 99285 EMERGENCY DEPT VISIT HI MDM: CPT

## 2024-02-22 PROCEDURE — 6360000002 HC RX W HCPCS: Performed by: EMERGENCY MEDICINE

## 2024-02-22 PROCEDURE — 2500000003 HC RX 250 WO HCPCS: Performed by: EMERGENCY MEDICINE

## 2024-02-22 PROCEDURE — 96376 TX/PRO/DX INJ SAME DRUG ADON: CPT

## 2024-02-22 PROCEDURE — 84703 CHORIONIC GONADOTROPIN ASSAY: CPT

## 2024-02-22 PROCEDURE — 2580000003 HC RX 258: Performed by: EMERGENCY MEDICINE

## 2024-02-22 PROCEDURE — 96375 TX/PRO/DX INJ NEW DRUG ADDON: CPT

## 2024-02-22 PROCEDURE — 81001 URINALYSIS AUTO W/SCOPE: CPT

## 2024-02-22 PROCEDURE — 80053 COMPREHEN METABOLIC PANEL: CPT

## 2024-02-22 PROCEDURE — A4216 STERILE WATER/SALINE, 10 ML: HCPCS | Performed by: EMERGENCY MEDICINE

## 2024-02-22 PROCEDURE — 96374 THER/PROPH/DIAG INJ IV PUSH: CPT

## 2024-02-22 PROCEDURE — 83735 ASSAY OF MAGNESIUM: CPT

## 2024-02-22 PROCEDURE — 85027 COMPLETE CBC AUTOMATED: CPT

## 2024-02-22 RX ORDER — 0.9 % SODIUM CHLORIDE 0.9 %
1000 INTRAVENOUS SOLUTION INTRAVENOUS ONCE
Status: COMPLETED | OUTPATIENT
Start: 2024-02-22 | End: 2024-02-22

## 2024-02-22 RX ORDER — HYDROMORPHONE HYDROCHLORIDE 1 MG/ML
1 INJECTION, SOLUTION INTRAMUSCULAR; INTRAVENOUS; SUBCUTANEOUS ONCE
Status: COMPLETED | OUTPATIENT
Start: 2024-02-22 | End: 2024-02-22

## 2024-02-22 RX ORDER — ONDANSETRON 4 MG/1
4 TABLET, FILM COATED ORAL EVERY 8 HOURS PRN
Qty: 12 TABLET | Refills: 0 | Status: SHIPPED | OUTPATIENT
Start: 2024-02-22

## 2024-02-22 RX ORDER — FENTANYL CITRATE 50 UG/ML
25 INJECTION, SOLUTION INTRAMUSCULAR; INTRAVENOUS ONCE
Status: DISCONTINUED | OUTPATIENT
Start: 2024-02-22 | End: 2024-02-22

## 2024-02-22 RX ORDER — TRAMADOL HYDROCHLORIDE 50 MG/1
50 TABLET ORAL EVERY 6 HOURS PRN
Qty: 12 TABLET | Refills: 0 | Status: SHIPPED | OUTPATIENT
Start: 2024-02-22 | End: 2024-02-25

## 2024-02-22 RX ORDER — KETOROLAC TROMETHAMINE 30 MG/ML
30 INJECTION, SOLUTION INTRAMUSCULAR; INTRAVENOUS ONCE
Status: COMPLETED | OUTPATIENT
Start: 2024-02-22 | End: 2024-02-22

## 2024-02-22 RX ORDER — ONDANSETRON 2 MG/ML
4 INJECTION INTRAMUSCULAR; INTRAVENOUS ONCE
Status: COMPLETED | OUTPATIENT
Start: 2024-02-22 | End: 2024-02-22

## 2024-02-22 RX ORDER — FAMOTIDINE 20 MG/1
20 TABLET, FILM COATED ORAL 2 TIMES DAILY
Qty: 60 TABLET | Refills: 3 | Status: SHIPPED | OUTPATIENT
Start: 2024-02-22

## 2024-02-22 RX ADMIN — HYDROMORPHONE HYDROCHLORIDE 1 MG: 1 INJECTION, SOLUTION INTRAMUSCULAR; INTRAVENOUS; SUBCUTANEOUS at 05:01

## 2024-02-22 RX ADMIN — SODIUM CHLORIDE 1000 ML: 9 INJECTION, SOLUTION INTRAVENOUS at 02:13

## 2024-02-22 RX ADMIN — FAMOTIDINE 20 MG: 10 INJECTION, SOLUTION INTRAVENOUS at 02:14

## 2024-02-22 RX ADMIN — SODIUM CHLORIDE 1000 ML: 9 INJECTION, SOLUTION INTRAVENOUS at 05:04

## 2024-02-22 RX ADMIN — IOPAMIDOL 75 ML: 755 INJECTION, SOLUTION INTRAVENOUS at 03:46

## 2024-02-22 RX ADMIN — ONDANSETRON 4 MG: 2 INJECTION INTRAMUSCULAR; INTRAVENOUS at 02:14

## 2024-02-22 RX ADMIN — HYDROMORPHONE HYDROCHLORIDE 1 MG: 1 INJECTION, SOLUTION INTRAMUSCULAR; INTRAVENOUS; SUBCUTANEOUS at 02:14

## 2024-02-22 RX ADMIN — KETOROLAC TROMETHAMINE 30 MG: 30 INJECTION, SOLUTION INTRAMUSCULAR; INTRAVENOUS at 03:39

## 2024-02-22 ASSESSMENT — PAIN SCALES - GENERAL: PAINLEVEL_OUTOF10: 8

## 2024-02-22 NOTE — DISCHARGE INSTRUCTIONS
Return if increased abdominal pain fevers vomiting bleeding follow-up with your surgeon as soon as possible

## 2024-02-22 NOTE — TELEPHONE ENCOUNTER
----- Message from Samira Nunez sent at 2/22/2024  8:58 AM EST -----  Regarding: Urgent  Contact: [phone]  Raienr so this morning was the fourth time in the hospital for my stomach they do not know what is going on. I have had 4x CT’s I know someone recommended and MRI of my stomach. I have a GI appointment tomorrow. However, if there is any blood work you think could gather some info that I can do can you put it through I know my liver numbers are up as well.    They are cancelling my insurance on the 29 so I am trying to get as much done now until I find something else.

## 2024-02-22 NOTE — ED PROVIDER NOTES
Dehydration        DISPOSITION  Disposition: Discharge to home  Patient condition is stable                  Lev Foster MD  02/22/24 0335       Lev Foster MD  02/22/24 0599

## 2024-02-23 ENCOUNTER — TELEPHONE (OUTPATIENT)
Dept: SURGERY | Age: 29
End: 2024-02-23

## 2024-02-23 NOTE — TELEPHONE ENCOUNTER
Prior Authorization Form:      DEMOGRAPHICS:                     Patient Name:  Rani Colon  Patient :  1995            Insurance:  Payor: [employer] [place] / Plan: [employer] [place] [place] / Product Type: *No Product type* /   Insurance ID Number:    Payer/Plan Subscr  Sex Relation Sub. Ins. ID Effective Group Num   1. Counts include 234 beds at the [facility]* RANI COLON 1995 Female Self [id number]                                   [address]         DIAGNOSIS & PROCEDURE:                       Procedure/Operation: EGD            CPT Code: 29323    Diagnosis:  epigastric pain     ICD10 Code: R10.13    Location:  [place]     Surgeon:  Dr. Zaidi     SCHEDULING INFORMATION:                          Date: 24    Time: 12:30pm              Anesthesia:  MAC/TIVA                                                       Status:  Outpatient        Special Comments:  N/A       Electronically signed by Olivia Menezes on 2024 at 4:07 PM

## 2024-02-23 NOTE — TELEPHONE ENCOUNTER
Scheduled patient for EGD on 2/28/24 at 12:30pm in [facility]. Patient needs to report at the front entrance 1 hour prior to the procedure, no ASA products until the procedure day. Patient verbalized understanding. Encouraged patient to call our office if any questions.     Electronically signed by Olivia Menezes on 2/23/2024 at 4:55 PM

## 2024-02-26 ENCOUNTER — APPOINTMENT (OUTPATIENT)
Dept: CT IMAGING | Age: 29
End: 2024-02-26
Payer: MEDICAID

## 2024-02-26 ENCOUNTER — TELEPHONE (OUTPATIENT)
Dept: SURGERY | Age: 29
End: 2024-02-26

## 2024-02-26 ENCOUNTER — HOSPITAL ENCOUNTER (EMERGENCY)
Age: 29
Discharge: HOME | End: 2024-02-26
Attending: STUDENT IN AN ORGANIZED HEALTH CARE EDUCATION/TRAINING PROGRAM
Payer: MEDICAID

## 2024-02-26 VITALS
DIASTOLIC BLOOD PRESSURE: 89 MMHG | HEART RATE: 63 BPM | BODY MASS INDEX: 40.98 KG/M2 | RESPIRATION RATE: 15 BRPM | OXYGEN SATURATION: 95 % | WEIGHT: 246 LBS | SYSTOLIC BLOOD PRESSURE: 144 MMHG | HEIGHT: 65 IN | TEMPERATURE: 98.1 F

## 2024-02-26 DIAGNOSIS — R10.13 ABDOMINAL PAIN, EPIGASTRIC: ICD-10-CM

## 2024-02-26 DIAGNOSIS — R10.9 ABDOMINAL PAIN, UNSPECIFIED ABDOMINAL LOCATION: Primary | ICD-10-CM

## 2024-02-26 DIAGNOSIS — R74.01 ELEVATED LIVER TRANSAMINASE LEVEL: Primary | ICD-10-CM

## 2024-02-26 LAB
ALBUMIN SERPL-MCNC: 3.8 G/DL (ref 3.5–5.2)
ALP SERPL-CCNC: 102 U/L (ref 35–104)
ALT SERPL-CCNC: 82 U/L (ref 0–32)
ANION GAP SERPL CALCULATED.3IONS-SCNC: 10 MMOL/L (ref 7–16)
AST SERPL-CCNC: 90 U/L (ref 0–31)
BASOPHILS # BLD: 0.04 K/UL (ref 0–0.2)
BASOPHILS NFR BLD: 0 % (ref 0–2)
BILIRUB SERPL-MCNC: 0.3 MG/DL (ref 0–1.2)
BILIRUB UR QL STRIP: NEGATIVE
BUN SERPL-MCNC: 11 MG/DL (ref 6–20)
CALCIUM SERPL-MCNC: 8.8 MG/DL (ref 8.6–10.2)
CHLORIDE SERPL-SCNC: 107 MMOL/L (ref 98–107)
CLARITY UR: CLEAR
CO2 SERPL-SCNC: 22 MMOL/L (ref 22–29)
COLOR UR: YELLOW
CREAT SERPL-MCNC: 0.7 MG/DL (ref 0.5–1)
D DIMER: 236 NG/ML DDU (ref 0–232)
EKG ATRIAL RATE: 86 BPM
EKG P AXIS: 62 DEGREES
EKG P-R INTERVAL: 132 MS
EKG Q-T INTERVAL: 374 MS
EKG QRS DURATION: 72 MS
EKG QTC CALCULATION (BAZETT): 447 MS
EKG R AXIS: 31 DEGREES
EKG T AXIS: 27 DEGREES
EKG VENTRICULAR RATE: 86 BPM
EOSINOPHIL # BLD: 0.12 K/UL (ref 0.05–0.5)
EOSINOPHILS RELATIVE PERCENT: 1 % (ref 0–6)
EPI CELLS #/AREA URNS HPF: ABNORMAL /HPF
ERYTHROCYTE [DISTWIDTH] IN BLOOD BY AUTOMATED COUNT: 12.9 % (ref 11.5–15)
GFR SERPL CREATININE-BSD FRML MDRD: >60 ML/MIN/1.73M2
GLUCOSE SERPL-MCNC: 120 MG/DL (ref 74–99)
GLUCOSE UR STRIP-MCNC: NEGATIVE MG/DL
HCG UR QL: NEGATIVE
HCT VFR BLD AUTO: 36.3 % (ref 34–48)
HGB BLD-MCNC: 12 G/DL (ref 11.5–15.5)
HGB UR QL STRIP.AUTO: ABNORMAL
IMM GRANULOCYTES # BLD AUTO: 0.04 K/UL (ref 0–0.58)
IMM GRANULOCYTES NFR BLD: 0 % (ref 0–5)
KETONES UR STRIP-MCNC: ABNORMAL MG/DL
LACTATE BLDV-SCNC: 1.6 MMOL/L (ref 0.5–2.2)
LEUKOCYTE ESTERASE UR QL STRIP: NEGATIVE
LIPASE SERPL-CCNC: 28 U/L (ref 13–60)
LYMPHOCYTES NFR BLD: 2.61 K/UL (ref 1.5–4)
LYMPHOCYTES RELATIVE PERCENT: 27 % (ref 20–42)
MCH RBC QN AUTO: 28.4 PG (ref 26–35)
MCHC RBC AUTO-ENTMCNC: 33.1 G/DL (ref 32–34.5)
MCV RBC AUTO: 86 FL (ref 80–99.9)
MONOCYTES NFR BLD: 0.78 K/UL (ref 0.1–0.95)
MONOCYTES NFR BLD: 8 % (ref 2–12)
NEUTROPHILS NFR BLD: 63 % (ref 43–80)
NEUTS SEG NFR BLD: 6.13 K/UL (ref 1.8–7.3)
NITRITE UR QL STRIP: NEGATIVE
PH UR STRIP: 6.5 [PH] (ref 5–9)
PLATELET # BLD AUTO: 400 K/UL (ref 130–450)
PMV BLD AUTO: 9.9 FL (ref 7–12)
POTASSIUM SERPL-SCNC: 3.9 MMOL/L (ref 3.5–5)
PROT SERPL-MCNC: 6.3 G/DL (ref 6.4–8.3)
PROT UR STRIP-MCNC: NEGATIVE MG/DL
RBC # BLD AUTO: 4.22 M/UL (ref 3.5–5.5)
RBC #/AREA URNS HPF: ABNORMAL /HPF
SODIUM SERPL-SCNC: 139 MMOL/L (ref 132–146)
SP GR UR STRIP: 1.02 (ref 1–1.03)
TROPONIN I SERPL HS-MCNC: <6 NG/L (ref 0–9)
UROBILINOGEN UR STRIP-ACNC: 1 EU/DL (ref 0–1)
WBC #/AREA URNS HPF: ABNORMAL /HPF
WBC OTHER # BLD: 9.7 K/UL (ref 4.5–11.5)

## 2024-02-26 PROCEDURE — 83605 ASSAY OF LACTIC ACID: CPT

## 2024-02-26 PROCEDURE — 84703 CHORIONIC GONADOTROPIN ASSAY: CPT

## 2024-02-26 PROCEDURE — 93010 ELECTROCARDIOGRAM REPORT: CPT | Performed by: INTERNAL MEDICINE

## 2024-02-26 PROCEDURE — 71275 CT ANGIOGRAPHY CHEST: CPT

## 2024-02-26 PROCEDURE — 6360000004 HC RX CONTRAST MEDICATION: Performed by: STUDENT IN AN ORGANIZED HEALTH CARE EDUCATION/TRAINING PROGRAM

## 2024-02-26 PROCEDURE — 74177 CT ABD & PELVIS W/CONTRAST: CPT

## 2024-02-26 PROCEDURE — 99285 EMERGENCY DEPT VISIT HI MDM: CPT

## 2024-02-26 PROCEDURE — 96375 TX/PRO/DX INJ NEW DRUG ADDON: CPT

## 2024-02-26 PROCEDURE — 80053 COMPREHEN METABOLIC PANEL: CPT

## 2024-02-26 PROCEDURE — 84484 ASSAY OF TROPONIN QUANT: CPT

## 2024-02-26 PROCEDURE — 96374 THER/PROPH/DIAG INJ IV PUSH: CPT

## 2024-02-26 PROCEDURE — 81001 URINALYSIS AUTO W/SCOPE: CPT

## 2024-02-26 PROCEDURE — 6370000000 HC RX 637 (ALT 250 FOR IP)

## 2024-02-26 PROCEDURE — 83690 ASSAY OF LIPASE: CPT

## 2024-02-26 PROCEDURE — 85025 COMPLETE CBC W/AUTO DIFF WBC: CPT

## 2024-02-26 PROCEDURE — 85379 FIBRIN DEGRADATION QUANT: CPT

## 2024-02-26 PROCEDURE — 2580000003 HC RX 258

## 2024-02-26 PROCEDURE — 6360000002 HC RX W HCPCS: Performed by: STUDENT IN AN ORGANIZED HEALTH CARE EDUCATION/TRAINING PROGRAM

## 2024-02-26 PROCEDURE — 93005 ELECTROCARDIOGRAM TRACING: CPT

## 2024-02-26 RX ORDER — 0.9 % SODIUM CHLORIDE 0.9 %
1000 INTRAVENOUS SOLUTION INTRAVENOUS ONCE
Status: COMPLETED | OUTPATIENT
Start: 2024-02-26 | End: 2024-02-26

## 2024-02-26 RX ORDER — OXYCODONE HYDROCHLORIDE 5 MG/1
5 TABLET ORAL EVERY 6 HOURS PRN
Qty: 5 TABLET | Refills: 0 | Status: ON HOLD | OUTPATIENT
Start: 2024-02-26 | End: 2024-03-01 | Stop reason: ALTCHOICE

## 2024-02-26 RX ORDER — ONDANSETRON 2 MG/ML
4 INJECTION INTRAMUSCULAR; INTRAVENOUS ONCE
Status: COMPLETED | OUTPATIENT
Start: 2024-02-26 | End: 2024-02-26

## 2024-02-26 RX ADMIN — LIDOCAINE HYDROCHLORIDE: 20 SOLUTION ORAL; TOPICAL at 02:15

## 2024-02-26 RX ADMIN — SODIUM CHLORIDE 1000 ML: 9 INJECTION, SOLUTION INTRAVENOUS at 01:35

## 2024-02-26 RX ADMIN — ONDANSETRON 4 MG: 2 INJECTION INTRAMUSCULAR; INTRAVENOUS at 02:49

## 2024-02-26 RX ADMIN — HYDROMORPHONE HYDROCHLORIDE 1 MG: 1 INJECTION, SOLUTION INTRAMUSCULAR; INTRAVENOUS; SUBCUTANEOUS at 02:47

## 2024-02-26 RX ADMIN — IOPAMIDOL 75 ML: 755 INJECTION, SOLUTION INTRAVENOUS at 04:21

## 2024-02-26 ASSESSMENT — LIFESTYLE VARIABLES
HOW OFTEN DO YOU HAVE A DRINK CONTAINING ALCOHOL: NEVER
HOW MANY STANDARD DRINKS CONTAINING ALCOHOL DO YOU HAVE ON A TYPICAL DAY: PATIENT DOES NOT DRINK

## 2024-02-26 ASSESSMENT — PAIN DESCRIPTION - ORIENTATION: ORIENTATION: RIGHT;LEFT;UPPER

## 2024-02-26 ASSESSMENT — ENCOUNTER SYMPTOMS
RHINORRHEA: 0
ABDOMINAL PAIN: 1
VOMITING: 0
BACK PAIN: 0
CHEST TIGHTNESS: 0
COUGH: 0
SHORTNESS OF BREATH: 0
NAUSEA: 1
DIARRHEA: 0
PHOTOPHOBIA: 0
SORE THROAT: 0

## 2024-02-26 ASSESSMENT — PAIN - FUNCTIONAL ASSESSMENT: PAIN_FUNCTIONAL_ASSESSMENT: 0-10

## 2024-02-26 ASSESSMENT — PAIN DESCRIPTION - DESCRIPTORS: DESCRIPTORS: SHARP

## 2024-02-26 ASSESSMENT — PAIN DESCRIPTION - LOCATION: LOCATION: ABDOMEN;BACK

## 2024-02-26 NOTE — DISCHARGE INSTRUCTIONS
If you develop fevers, uncontrolled pain, worsening symptoms go to emergency department for evaluation.

## 2024-02-26 NOTE — ED PROVIDER NOTES
[facility] EMERGENCY DEPARTMENT  EMERGENCY DEPARTMENT ENCOUNTER        Pt Name: Samira Nunez  MRN: [id number]  Birthdate 1995  Date of evaluation: 2/26/2024  Provider: Chin Fox DO  PCP: Janette Escalante DO  Note Started: 2:41 AM EST 2/26/24    CHIEF COMPLAINT       Chief Complaint   Patient presents with    Abdominal Pain     C/o bilateral upper quad abd pain, radiates into back. Hx of kidney stones. States it started approx 3 hours ago, woke patient out of sleep. EMS gave 1 Dilaudid    Nausea     EMS gave 4 ODT Zofran       HISTORY OF PRESENT ILLNESS: 1 or more Elements   History From: Patient      Samira Nunez is a 28 y.o. female who presents to emergency room for evaluation of abdominal pain and nausea.  Patient states that she has had sudden onset of abdominal pain approximate 3 hours ago.  Patient states this pain woke her out of her sleep.  Patient states that she has had multiple workups that showed no evidence of any problem in her abdomen.  Initially thought patient had gastroparesis however she does not.  Patient was given 1 mg of Dilaudid via EMS.  Patient denies any dysuria.  Patient denies anybody with similar symptoms.  Patient states this does not feel like kidney stone pain.  Patient felt nauseous but no episodes of emesis.  Patient's pain is midsternal.  Review of Systems   Constitutional:  Negative for appetite change, chills, fatigue and fever.   HENT:  Negative for congestion, rhinorrhea and sore throat.    Eyes:  Negative for photophobia and visual disturbance.   Respiratory:  Negative for cough, chest tightness and shortness of breath.    Cardiovascular:  Positive for chest pain. Negative for palpitations.   Gastrointestinal:  Positive for abdominal pain and nausea. Negative for diarrhea and vomiting.   Endocrine: Negative for polydipsia and polyuria.   Genitourinary:  Negative for dysuria.   Musculoskeletal:  Negative for back pain and neck

## 2024-02-26 NOTE — PROGRESS NOTES
[facility] PRE-ADMISSION TESTING INSTRUCTIONS    The Preadmission Testing patient is instructed accordingly using the following criteria (check applicable):    ARRIVAL INSTRUCTIONS:  [x] Parking the day of Surgery is located in the Main Entrance lot.  Upon entering the door, make an immediate right to the surgery reception desk    [x] Bring photo ID and insurance card    [] Bring in a copy of Living will or Durable Power of  papers.    [x] Please be sure to arrange for a responsible adult to provide transportation to and from the hospital    [x] Please arrange for a responsible adult to be with you for the 24 hour period post procedure due to having anesthesia    [x] If you awake am of surgery not feeling well or have temperature >100 please call [phone]    GENERAL INSTRUCTIONS:    [x] No solid foods after midnight, may have up to 8oz of water until 4 hours prior to surgery. No gum, no candy, no mints. NPO time:       [x] You may brush your teeth, do not swallow any toothpaste    [x] Take medications as instructed     [x] Stop herbal supplements and vitamins 5 days prior to procedure    [x] Follow preop dosing of blood thinners per physician instructions    [] Take 1/2 dose of evening insulin, but no insulin after midnight    [] No oral diabetic medications after midnight    [] If diabetic and have low blood sugar or feel symptomatic, take 1-2oz apple juice only    [x] Bring inhalers day of surgery    [x] Bring urine specimen day of surgery    [x] Shower or bath with soap, lather and rinse well, AM of Surgery, no lotion, powders or creams to surgical site    [] Follow bowel prep as instructed per surgeon    [x] No tobacco products within 24 hours of surgery     [x] No alcohol or illegal drug use, marijuana included, within 24 hours of surgery.    [x] Jewelry, body piercing's, eyeglasses, contact lenses and dentures are not permitted into surgery (bring cases)      [x] Please

## 2024-02-26 NOTE — TELEPHONE ENCOUNTER
Pt states that she has 2 prescriptions for ppi, protonix 40 mg qd and pepcid 20 mg bid. Pt is asking if she takes both or just one. Per Dr Zaidi, pt advised to stop pepcid and just take the protonix 40 mg qd. Pt voiced understanding. Message routed to April.

## 2024-02-27 ENCOUNTER — ANESTHESIA EVENT (OUTPATIENT)
Dept: ENDOSCOPY | Age: 29
End: 2024-02-27
Payer: MEDICAID

## 2024-02-27 ASSESSMENT — LIFESTYLE VARIABLES: SMOKING_STATUS: 0

## 2024-02-27 NOTE — ANESTHESIA PRE PROCEDURE
Department of Anesthesiology  Preprocedure Note       Name:  Samira Nunez   Age:  28 y.o.  :  1995                                          MRN:  [id number]         Date:  2024      Surgeon: Surgeon(s):  Trav Zaidi DO    Procedure: Procedure(s):  EGD ESOPHAGOGASTRODUODENOSCOPY    Medications prior to admission:   Prior to Admission medications    Medication Sig Start Date End Date Taking? Authorizing Provider   oxyCODONE (ROXICODONE) 5 MG immediate release tablet Take 1 tablet by mouth every 6 hours as needed for Pain for up to 5 days. Intended supply: 5 days. Take lowest dose possible to manage pain Max Daily Amount: 20 mg 2/26/24 3/2/24  Tariq De La O DO   ondansetron (ZOFRAN) 4 MG tablet Take 1 tablet by mouth every 8 hours as needed for Nausea 24   Lev Foster MD   famotidine (PEPCID) 20 MG tablet Take 1 tablet by mouth 2 times daily 24   Lev Foster MD   sucralfate (CARAFATE) 1 GM tablet Take 1 tablet by mouth 4 times daily for 10 days 2/20/24 3/1/24  Mehnaz Harris MD   dicyclomine (BENTYL) 10 MG capsule Take 1 capsule by mouth 4 times daily (before meals and nightly) for 5 days 24  Mehnaz Harris MD   amphetamine-dextroamphetamine (ADDERALL) 20 MG tablet Take 1 tablet by mouth 2 times daily for 30 days. Max Daily Amount: 2 tablets 2/5/24 3/6/24  Janette Escalante DO   PARoxetine (PAXIL) 20 MG tablet Take 1 tablet by mouth at bedtime 24   Janette Escalante DO   metoclopramide (REGLAN) 10 MG tablet Take 1 tablet by mouth 3 times daily (with meals) 24   Trav Zaidi DO   montelukast (SINGULAIR) 10 MG tablet Take 1 tablet by mouth nightly 24   Janette Escalante DO   albuterol sulfate HFA (VENTOLIN HFA) 108 (90 Base) MCG/ACT inhaler inhale 2 puffs by mouth and INTO THE LUNGS every 4 hours if needed for wheezing 24   Janette Escalante DO   pantoprazole (PROTONIX) 40 MG tablet Take 1 tablet by mouth every morning (before

## 2024-02-28 ENCOUNTER — TELEPHONE (OUTPATIENT)
Dept: SURGERY | Age: 29
End: 2024-02-28

## 2024-02-28 ENCOUNTER — HOSPITAL ENCOUNTER (OUTPATIENT)
Age: 29
Setting detail: OUTPATIENT SURGERY
Discharge: HOME | End: 2024-02-28
Attending: SURGERY | Admitting: SURGERY
Payer: MEDICAID

## 2024-02-28 ENCOUNTER — ANESTHESIA EVENT (OUTPATIENT)
Dept: OPERATING ROOM | Age: 29
End: 2024-02-28
Payer: MEDICAID

## 2024-02-28 ENCOUNTER — ANESTHESIA (OUTPATIENT)
Dept: ENDOSCOPY | Age: 29
End: 2024-02-28
Payer: MEDICAID

## 2024-02-28 VITALS
SYSTOLIC BLOOD PRESSURE: 130 MMHG | DIASTOLIC BLOOD PRESSURE: 60 MMHG | TEMPERATURE: 98 F | RESPIRATION RATE: 18 BRPM | HEIGHT: 65 IN | WEIGHT: 246 LBS | OXYGEN SATURATION: 100 % | HEART RATE: 67 BPM | BODY MASS INDEX: 40.98 KG/M2

## 2024-02-28 DIAGNOSIS — R10.13 EPIGASTRIC PAIN: ICD-10-CM

## 2024-02-28 LAB
HCG, URINE, POC: NEGATIVE
HCG, URINE, POC: NEGATIVE
Lab: NORMAL
Lab: NORMAL
NEGATIVE QC PASS/FAIL: NORMAL
NEGATIVE QC PASS/FAIL: NORMAL
POSITIVE QC PASS/FAIL: NORMAL
POSITIVE QC PASS/FAIL: NORMAL

## 2024-02-28 PROCEDURE — 3700000000 HC ANESTHESIA ATTENDED CARE: Performed by: SURGERY

## 2024-02-28 PROCEDURE — 6360000002 HC RX W HCPCS: Performed by: NURSE ANESTHETIST, CERTIFIED REGISTERED

## 2024-02-28 PROCEDURE — 2580000003 HC RX 258: Performed by: SURGERY

## 2024-02-28 PROCEDURE — 7100000011 HC PHASE II RECOVERY - ADDTL 15 MIN: Performed by: SURGERY

## 2024-02-28 PROCEDURE — 2709999900 HC NON-CHARGEABLE SUPPLY: Performed by: SURGERY

## 2024-02-28 PROCEDURE — 3609017100 HC EGD: Performed by: SURGERY

## 2024-02-28 PROCEDURE — 7100000010 HC PHASE II RECOVERY - FIRST 15 MIN: Performed by: SURGERY

## 2024-02-28 PROCEDURE — 2500000003 HC RX 250 WO HCPCS: Performed by: NURSE ANESTHETIST, CERTIFIED REGISTERED

## 2024-02-28 PROCEDURE — 43235 EGD DIAGNOSTIC BRUSH WASH: CPT | Performed by: SURGERY

## 2024-02-28 RX ORDER — PROPOFOL 10 MG/ML
INJECTION, EMULSION INTRAVENOUS PRN
Status: DISCONTINUED | OUTPATIENT
Start: 2024-02-28 | End: 2024-02-28 | Stop reason: SDUPTHER

## 2024-02-28 RX ORDER — ONDANSETRON 4 MG/1
4 TABLET, ORALLY DISINTEGRATING ORAL EVERY 8 HOURS PRN
Status: CANCELLED | OUTPATIENT
Start: 2024-02-28

## 2024-02-28 RX ORDER — SODIUM CHLORIDE 0.9 % (FLUSH) 0.9 %
5-40 SYRINGE (ML) INJECTION EVERY 12 HOURS SCHEDULED
Status: CANCELLED | OUTPATIENT
Start: 2024-02-28

## 2024-02-28 RX ORDER — SODIUM CHLORIDE 9 MG/ML
25 INJECTION, SOLUTION INTRAVENOUS PRN
Status: DISCONTINUED | OUTPATIENT
Start: 2024-02-28 | End: 2024-02-28 | Stop reason: HOSPADM

## 2024-02-28 RX ORDER — ONDANSETRON 2 MG/ML
4 INJECTION INTRAMUSCULAR; INTRAVENOUS EVERY 6 HOURS PRN
Status: CANCELLED | OUTPATIENT
Start: 2024-02-28

## 2024-02-28 RX ORDER — LIDOCAINE HYDROCHLORIDE 20 MG/ML
INJECTION, SOLUTION EPIDURAL; INFILTRATION; INTRACAUDAL; PERINEURAL PRN
Status: DISCONTINUED | OUTPATIENT
Start: 2024-02-28 | End: 2024-02-28 | Stop reason: SDUPTHER

## 2024-02-28 RX ORDER — SODIUM CHLORIDE 0.9 % (FLUSH) 0.9 %
5-40 SYRINGE (ML) INJECTION PRN
Status: DISCONTINUED | OUTPATIENT
Start: 2024-02-28 | End: 2024-02-28 | Stop reason: HOSPADM

## 2024-02-28 RX ORDER — SODIUM CHLORIDE 0.9 % (FLUSH) 0.9 %
5-40 SYRINGE (ML) INJECTION PRN
Status: CANCELLED | OUTPATIENT
Start: 2024-02-28

## 2024-02-28 RX ORDER — SODIUM CHLORIDE 9 MG/ML
INJECTION, SOLUTION INTRAVENOUS PRN
Status: CANCELLED | OUTPATIENT
Start: 2024-02-28

## 2024-02-28 RX ORDER — SODIUM CHLORIDE 0.9 % (FLUSH) 0.9 %
5-40 SYRINGE (ML) INJECTION EVERY 12 HOURS SCHEDULED
Status: DISCONTINUED | OUTPATIENT
Start: 2024-02-28 | End: 2024-02-28 | Stop reason: HOSPADM

## 2024-02-28 RX ADMIN — LIDOCAINE HYDROCHLORIDE 80 MG: 20 INJECTION, SOLUTION EPIDURAL; INFILTRATION; INTRACAUDAL; PERINEURAL at 12:29

## 2024-02-28 RX ADMIN — SODIUM CHLORIDE: 9 INJECTION, SOLUTION INTRAVENOUS at 12:25

## 2024-02-28 RX ADMIN — PROPOFOL 230 MG: 10 INJECTION, EMULSION INTRAVENOUS at 12:29

## 2024-02-28 NOTE — TELEPHONE ENCOUNTER
Prior Authorization Form:      DEMOGRAPHICS:                     Patient Name:  Rani Colon  Patient :  1995            Insurance:  Payor: [employer] / Plan: [employer] [place] / Product Type: *No Product type* /   Insurance ID Number:    Payer/Plan Subscr  Sex Relation Sub. Ins. ID Effective Group Num   1. [facility]* RANI COLON 1995 Female Self [id number]                                   [address]         DIAGNOSIS & PROCEDURE:                       Procedure/Operation: lap cholecystectomy           CPT Code: 15389    Diagnosis:  biliary dyskinesia and RUQ pain     ICD10 Code: K82.8 and R10.11    Location:  [place]     Surgeon:  Dr. Zaidi     SCHEDULING INFORMATION:                          Date: 24    Time: 8:30am              Anesthesia:  General                                                       Status:  Outpatient        Special Comments:  N/A       Electronically signed by Olivia Menezes on 2024 at 12:55 PM

## 2024-02-28 NOTE — INTERVAL H&P NOTE
Update History & Physical    The patient's History and Physical of February 16, 2024 was reviewed with the patient and I examined the patient. There was no change. The surgical site was confirmed by the patient and me.     Plan: The risks, benefits, expected outcome, and alternative to the recommended procedure have been discussed with the patient. Patient understands and wants to proceed with the procedure.     Electronically signed by Trav Zaidi DO on 2/28/2024 at 12:12 PM

## 2024-02-28 NOTE — PROGRESS NOTES
[facility] PRE-ADMISSION TESTING INSTRUCTIONS    The Preadmission Testing patient is instructed accordingly using the following criteria (check applicable):    ARRIVAL INSTRUCTIONS:  [x] Parking the day of Surgery is located in the Main Entrance lot.  Upon entering the door, make an immediate right to the surgery reception desk    [x] Bring photo ID and insurance card    [] Bring in a copy of Living will or Durable Power of  papers.    [x] Please be sure to arrange for a responsible adult to provide transportation to and from the hospital    [x] Please arrange for a responsible adult to be with you for the 24 hour period post procedure due to having anesthesia    [x] If you awake am of surgery not feeling well or have temperature >100 please call [phone]    GENERAL INSTRUCTIONS:    [x] No solid foods after midnight, may have up to 8oz of water until 4 hours prior to surgery. No gum, no candy, no mints. NPO time: 0500       [x] You may brush your teeth, do not swallow any toothpaste    [x] Take medications as instructed     [x] Stop herbal supplements and vitamins 5 days prior to procedure    [] Follow preop dosing of blood thinners per physician instructions    [] Take 1/2 dose of evening insulin, but no insulin after midnight    [] No oral diabetic medications after midnight    [] If diabetic and have low blood sugar or feel symptomatic, take 1-2oz apple juice only    [] Bring inhalers day of surgery    [x] Bring urine specimen day of surgery    [x] Shower or bath with soap, lather and rinse well, AM of Surgery, no lotion, powders or creams to surgical site    [] Follow bowel prep as instructed per surgeon    [x] No tobacco products within 24 hours of surgery     [x] No alcohol or illegal drug use, marijuana included, within 24 hours of surgery.    [x] Jewelry, body piercing's, eyeglasses, contact lenses and dentures are not permitted into surgery (bring cases)      [x]

## 2024-02-28 NOTE — ANESTHESIA POSTPROCEDURE EVALUATION
Department of Anesthesiology  Postprocedure Note    Patient: Samira Nunez  MRN: [id number]  YOB: 1995  Date of evaluation: 2/28/2024    Procedure Summary       Date: 02/28/24 Room / Location: [address]    Anesthesia Start: 1225 Anesthesia Stop: 1238    Procedure: EGD ESOPHAGOGASTRODUODENOSCOPY Diagnosis:       Epigastric pain      (Epigastric pain [R10.13])    Surgeons: Trav Zaidi DO Responsible Provider: Paolo Barragan DO    Anesthesia Type: MAC ASA Status: 3            Anesthesia Type: No value filed.    Grecia Phase I: Grecia Score: 10    Grecia Phase II:      Anesthesia Post Evaluation    Patient location during evaluation: bedside  Patient participation: complete - patient participated  Level of consciousness: awake and alert  Pain score: 0  Airway patency: patent  Nausea & Vomiting: no nausea and no vomiting  Cardiovascular status: blood pressure returned to baseline  Respiratory status: acceptable  Hydration status: euvolemic  Pain management: adequate        No notable events documented.

## 2024-02-28 NOTE — TELEPHONE ENCOUNTER
Attempted to call the patient. No answer, left message on the phone that lap justin is scheduled on 2/29/24 at 8:30am, arrival time is at 6:30am in [facility] after the midnight.  Electronically signed by Olivia Menezes on 2/28/2024 at 1:26 PM

## 2024-02-28 NOTE — TELEPHONE ENCOUNTER
Called and spoke with [facility] representative Oleksandr Leggett. Franko anderson does not require the prior authorization. Ref. Number is [id number].  Electronically signed by Olivia Menezes on 2/28/2024 at 1:22 PM

## 2024-02-29 ENCOUNTER — APPOINTMENT (OUTPATIENT)
Dept: GENERAL RADIOLOGY | Age: 29
End: 2024-02-29
Attending: SURGERY
Payer: MEDICAID

## 2024-02-29 ENCOUNTER — ANESTHESIA (OUTPATIENT)
Dept: OPERATING ROOM | Age: 29
End: 2024-02-29
Payer: MEDICAID

## 2024-02-29 ENCOUNTER — HOSPITAL ENCOUNTER (OUTPATIENT)
Age: 29
Setting detail: OUTPATIENT SURGERY
Discharge: HOME | End: 2024-02-29
Attending: SURGERY | Admitting: SURGERY
Payer: MEDICAID

## 2024-02-29 VITALS
HEIGHT: 65 IN | OXYGEN SATURATION: 94 % | TEMPERATURE: 97.4 F | HEART RATE: 82 BPM | WEIGHT: 246 LBS | SYSTOLIC BLOOD PRESSURE: 133 MMHG | DIASTOLIC BLOOD PRESSURE: 72 MMHG | BODY MASS INDEX: 40.98 KG/M2 | RESPIRATION RATE: 16 BRPM

## 2024-02-29 DIAGNOSIS — Z90.49 S/P LAPAROSCOPIC CHOLECYSTECTOMY: Primary | ICD-10-CM

## 2024-02-29 DIAGNOSIS — R10.11 RIGHT UPPER QUADRANT PAIN: ICD-10-CM

## 2024-02-29 DIAGNOSIS — K82.8 BILIARY DYSKINESIA: ICD-10-CM

## 2024-02-29 LAB
HCG, URINE, POC: NEGATIVE
Lab: NORMAL
NEGATIVE QC PASS/FAIL: NORMAL
POSITIVE QC PASS/FAIL: NORMAL

## 2024-02-29 PROCEDURE — S2900 ROBOTIC SURGICAL SYSTEM: HCPCS | Performed by: SURGERY

## 2024-02-29 PROCEDURE — 6370000000 HC RX 637 (ALT 250 FOR IP): Performed by: ANESTHESIOLOGY

## 2024-02-29 PROCEDURE — 7100000001 HC PACU RECOVERY - ADDTL 15 MIN: Performed by: SURGERY

## 2024-02-29 PROCEDURE — 2709999900 HC NON-CHARGEABLE SUPPLY: Performed by: SURGERY

## 2024-02-29 PROCEDURE — 7100000011 HC PHASE II RECOVERY - ADDTL 15 MIN: Performed by: SURGERY

## 2024-02-29 PROCEDURE — 6360000002 HC RX W HCPCS: Performed by: NURSE ANESTHETIST, CERTIFIED REGISTERED

## 2024-02-29 PROCEDURE — 6360000002 HC RX W HCPCS: Performed by: SURGERY

## 2024-02-29 PROCEDURE — 6370000000 HC RX 637 (ALT 250 FOR IP): Performed by: SURGERY

## 2024-02-29 PROCEDURE — 3700000000 HC ANESTHESIA ATTENDED CARE: Performed by: SURGERY

## 2024-02-29 PROCEDURE — 2500000003 HC RX 250 WO HCPCS: Performed by: SURGERY

## 2024-02-29 PROCEDURE — 6360000002 HC RX W HCPCS

## 2024-02-29 PROCEDURE — 7100000000 HC PACU RECOVERY - FIRST 15 MIN: Performed by: SURGERY

## 2024-02-29 PROCEDURE — 3600000009 HC SURGERY ROBOT BASE: Performed by: SURGERY

## 2024-02-29 PROCEDURE — 3600000019 HC SURGERY ROBOT ADDTL 15MIN: Performed by: SURGERY

## 2024-02-29 PROCEDURE — 2580000003 HC RX 258: Performed by: NURSE ANESTHETIST, CERTIFIED REGISTERED

## 2024-02-29 PROCEDURE — 6360000002 HC RX W HCPCS: Performed by: ANESTHESIOLOGY

## 2024-02-29 PROCEDURE — 47563 LAPARO CHOLECYSTECTOMY/GRAPH: CPT | Performed by: SURGERY

## 2024-02-29 PROCEDURE — 2500000003 HC RX 250 WO HCPCS: Performed by: ANESTHESIOLOGY

## 2024-02-29 PROCEDURE — 2580000003 HC RX 258: Performed by: SURGERY

## 2024-02-29 PROCEDURE — 2580000003 HC RX 258: Performed by: ANESTHESIOLOGY

## 2024-02-29 PROCEDURE — 7100000010 HC PHASE II RECOVERY - FIRST 15 MIN: Performed by: SURGERY

## 2024-02-29 PROCEDURE — C1889 IMPLANT/INSERT DEVICE, NOC: HCPCS | Performed by: SURGERY

## 2024-02-29 PROCEDURE — 2500000003 HC RX 250 WO HCPCS: Performed by: NURSE ANESTHETIST, CERTIFIED REGISTERED

## 2024-02-29 PROCEDURE — 88304 TISSUE EXAM BY PATHOLOGIST: CPT

## 2024-02-29 PROCEDURE — 71045 X-RAY EXAM CHEST 1 VIEW: CPT

## 2024-02-29 PROCEDURE — A4216 STERILE WATER/SALINE, 10 ML: HCPCS | Performed by: ANESTHESIOLOGY

## 2024-02-29 PROCEDURE — 3700000001 HC ADD 15 MINUTES (ANESTHESIA): Performed by: SURGERY

## 2024-02-29 DEVICE — CLIP INT M L POLYMER LOK LIG HEM O LOK: Type: IMPLANTABLE DEVICE | Site: ABDOMEN | Status: FUNCTIONAL

## 2024-02-29 RX ORDER — HYDRALAZINE HYDROCHLORIDE 20 MG/ML
5 INJECTION INTRAMUSCULAR; INTRAVENOUS
Status: DISCONTINUED | OUTPATIENT
Start: 2024-02-29 | End: 2024-02-29 | Stop reason: HOSPADM

## 2024-02-29 RX ORDER — SODIUM CHLORIDE 9 MG/ML
INJECTION, SOLUTION INTRAVENOUS CONTINUOUS PRN
Status: DISCONTINUED | OUTPATIENT
Start: 2024-02-29 | End: 2024-02-29 | Stop reason: SDUPTHER

## 2024-02-29 RX ORDER — ROCURONIUM BROMIDE 10 MG/ML
INJECTION, SOLUTION INTRAVENOUS PRN
Status: DISCONTINUED | OUTPATIENT
Start: 2024-02-29 | End: 2024-02-29 | Stop reason: SDUPTHER

## 2024-02-29 RX ORDER — FENTANYL CITRATE 50 UG/ML
25 INJECTION, SOLUTION INTRAMUSCULAR; INTRAVENOUS EVERY 5 MIN PRN
Status: DISCONTINUED | OUTPATIENT
Start: 2024-02-29 | End: 2024-02-29 | Stop reason: HOSPADM

## 2024-02-29 RX ORDER — SODIUM CHLORIDE 0.9 % (FLUSH) 0.9 %
5-40 SYRINGE (ML) INJECTION EVERY 12 HOURS SCHEDULED
Status: DISCONTINUED | OUTPATIENT
Start: 2024-02-29 | End: 2024-02-29 | Stop reason: HOSPADM

## 2024-02-29 RX ORDER — DIPHENHYDRAMINE HYDROCHLORIDE 50 MG/ML
12.5 INJECTION INTRAMUSCULAR; INTRAVENOUS
Status: DISCONTINUED | OUTPATIENT
Start: 2024-02-29 | End: 2024-02-29 | Stop reason: HOSPADM

## 2024-02-29 RX ORDER — INDOCYANINE GREEN AND WATER 25 MG
2.5 KIT INJECTION ONCE
Status: COMPLETED | OUTPATIENT
Start: 2024-02-29 | End: 2024-02-29

## 2024-02-29 RX ORDER — METHOCARBAMOL 100 MG/ML
INJECTION, SOLUTION INTRAMUSCULAR; INTRAVENOUS
Status: COMPLETED
Start: 2024-02-29 | End: 2024-02-29

## 2024-02-29 RX ORDER — METOCLOPRAMIDE HYDROCHLORIDE 5 MG/ML
10 INJECTION INTRAMUSCULAR; INTRAVENOUS ONCE
Status: COMPLETED | OUTPATIENT
Start: 2024-02-29 | End: 2024-02-29

## 2024-02-29 RX ORDER — MEPERIDINE HYDROCHLORIDE 25 MG/ML
25 INJECTION INTRAMUSCULAR; INTRAVENOUS; SUBCUTANEOUS ONCE
Status: DISCONTINUED | OUTPATIENT
Start: 2024-02-29 | End: 2024-02-29 | Stop reason: HOSPADM

## 2024-02-29 RX ORDER — ACETAMINOPHEN 500 MG
1000 TABLET ORAL ONCE
Status: COMPLETED | OUTPATIENT
Start: 2024-02-29 | End: 2024-02-29

## 2024-02-29 RX ORDER — SODIUM CHLORIDE 0.9 % (FLUSH) 0.9 %
5-40 SYRINGE (ML) INJECTION PRN
Status: DISCONTINUED | OUTPATIENT
Start: 2024-02-29 | End: 2024-02-29 | Stop reason: HOSPADM

## 2024-02-29 RX ORDER — ACETAMINOPHEN 500 MG
1000 TABLET ORAL ONCE
Status: DISCONTINUED | OUTPATIENT
Start: 2024-02-29 | End: 2024-02-29 | Stop reason: HOSPADM

## 2024-02-29 RX ORDER — PROMETHAZINE HYDROCHLORIDE 25 MG/ML
25 INJECTION, SOLUTION INTRAMUSCULAR; INTRAVENOUS ONCE
Status: DISCONTINUED | OUTPATIENT
Start: 2024-02-29 | End: 2024-02-29 | Stop reason: HOSPADM

## 2024-02-29 RX ORDER — LIDOCAINE HYDROCHLORIDE 20 MG/ML
INJECTION, SOLUTION EPIDURAL; INFILTRATION; INTRACAUDAL; PERINEURAL PRN
Status: DISCONTINUED | OUTPATIENT
Start: 2024-02-29 | End: 2024-02-29 | Stop reason: SDUPTHER

## 2024-02-29 RX ORDER — PROPOFOL 10 MG/ML
INJECTION, EMULSION INTRAVENOUS PRN
Status: DISCONTINUED | OUTPATIENT
Start: 2024-02-29 | End: 2024-02-29 | Stop reason: SDUPTHER

## 2024-02-29 RX ORDER — FENTANYL CITRATE 50 UG/ML
INJECTION, SOLUTION INTRAMUSCULAR; INTRAVENOUS PRN
Status: DISCONTINUED | OUTPATIENT
Start: 2024-02-29 | End: 2024-02-29 | Stop reason: SDUPTHER

## 2024-02-29 RX ORDER — ACETAMINOPHEN 325 MG/1
650 TABLET ORAL 4 TIMES DAILY
Qty: 40 TABLET | Refills: 0 | Status: SHIPPED | OUTPATIENT
Start: 2024-02-29 | End: 2024-03-01 | Stop reason: ALTCHOICE

## 2024-02-29 RX ORDER — MEPERIDINE HYDROCHLORIDE 25 MG/ML
12.5 INJECTION INTRAMUSCULAR; INTRAVENOUS; SUBCUTANEOUS ONCE
Status: DISCONTINUED | OUTPATIENT
Start: 2024-02-29 | End: 2024-02-29 | Stop reason: HOSPADM

## 2024-02-29 RX ORDER — ONDANSETRON 2 MG/ML
INJECTION INTRAMUSCULAR; INTRAVENOUS PRN
Status: DISCONTINUED | OUTPATIENT
Start: 2024-02-29 | End: 2024-02-29 | Stop reason: SDUPTHER

## 2024-02-29 RX ORDER — SODIUM CHLORIDE 9 MG/ML
INJECTION, SOLUTION INTRAVENOUS PRN
Status: DISCONTINUED | OUTPATIENT
Start: 2024-02-29 | End: 2024-02-29 | Stop reason: HOSPADM

## 2024-02-29 RX ORDER — BUPIVACAINE HYDROCHLORIDE 2.5 MG/ML
INJECTION, SOLUTION EPIDURAL; INFILTRATION; INTRACAUDAL PRN
Status: DISCONTINUED | OUTPATIENT
Start: 2024-02-29 | End: 2024-02-29 | Stop reason: ALTCHOICE

## 2024-02-29 RX ORDER — OXYCODONE HYDROCHLORIDE 5 MG/1
5 TABLET ORAL EVERY 6 HOURS PRN
Qty: 10 TABLET | Refills: 0 | Status: SHIPPED | OUTPATIENT
Start: 2024-02-29 | End: 2024-03-05

## 2024-02-29 RX ORDER — DEXAMETHASONE SODIUM PHOSPHATE 4 MG/ML
INJECTION, SOLUTION INTRA-ARTICULAR; INTRALESIONAL; INTRAMUSCULAR; INTRAVENOUS; SOFT TISSUE PRN
Status: DISCONTINUED | OUTPATIENT
Start: 2024-02-29 | End: 2024-02-29 | Stop reason: SDUPTHER

## 2024-02-29 RX ORDER — PROCHLORPERAZINE EDISYLATE 5 MG/ML
5 INJECTION INTRAMUSCULAR; INTRAVENOUS
Status: COMPLETED | OUTPATIENT
Start: 2024-02-29 | End: 2024-02-29

## 2024-02-29 RX ORDER — LABETALOL HYDROCHLORIDE 5 MG/ML
5 INJECTION, SOLUTION INTRAVENOUS
Status: DISCONTINUED | OUTPATIENT
Start: 2024-02-29 | End: 2024-02-29 | Stop reason: HOSPADM

## 2024-02-29 RX ORDER — KETAMINE HYDROCHLORIDE 10 MG/ML
INJECTION, SOLUTION INTRAMUSCULAR; INTRAVENOUS PRN
Status: DISCONTINUED | OUTPATIENT
Start: 2024-02-29 | End: 2024-02-29 | Stop reason: SDUPTHER

## 2024-02-29 RX ORDER — OXYCODONE HYDROCHLORIDE 5 MG/1
5 TABLET ORAL ONCE
Status: COMPLETED | OUTPATIENT
Start: 2024-02-29 | End: 2024-02-29

## 2024-02-29 RX ADMIN — KETAMINE HYDROCHLORIDE 20 MG: 10 INJECTION INTRAMUSCULAR; INTRAVENOUS at 09:05

## 2024-02-29 RX ADMIN — LIDOCAINE HYDROCHLORIDE 100 MG: 20 INJECTION, SOLUTION EPIDURAL; INFILTRATION; INTRACAUDAL; PERINEURAL at 09:05

## 2024-02-29 RX ADMIN — SODIUM CHLORIDE: 9 INJECTION, SOLUTION INTRAVENOUS at 08:54

## 2024-02-29 RX ADMIN — FAMOTIDINE 20 MG: 10 INJECTION, SOLUTION INTRAVENOUS at 08:42

## 2024-02-29 RX ADMIN — FENTANYL CITRATE 25 MCG: 50 INJECTION, SOLUTION INTRAMUSCULAR; INTRAVENOUS at 10:37

## 2024-02-29 RX ADMIN — FENTANYL CITRATE 25 MCG: 50 INJECTION, SOLUTION INTRAMUSCULAR; INTRAVENOUS at 10:18

## 2024-02-29 RX ADMIN — METOCLOPRAMIDE 10 MG: 5 INJECTION, SOLUTION INTRAMUSCULAR; INTRAVENOUS at 08:37

## 2024-02-29 RX ADMIN — PROPOFOL 160 MG: 10 INJECTION, EMULSION INTRAVENOUS at 09:05

## 2024-02-29 RX ADMIN — SODIUM CHLORIDE: 9 INJECTION, SOLUTION INTRAVENOUS at 09:54

## 2024-02-29 RX ADMIN — METHOCARBAMOL 1000 MG: 100 INJECTION, SOLUTION INTRAMUSCULAR; INTRAVENOUS at 10:59

## 2024-02-29 RX ADMIN — SUGAMMADEX 250 MG: 100 INJECTION, SOLUTION INTRAVENOUS at 10:23

## 2024-02-29 RX ADMIN — DEXAMETHASONE SODIUM PHOSPHATE 10 MG: 4 INJECTION, SOLUTION INTRAMUSCULAR; INTRAVENOUS at 09:10

## 2024-02-29 RX ADMIN — OXYCODONE 5 MG: 5 TABLET ORAL at 13:48

## 2024-02-29 RX ADMIN — HYDROMORPHONE HYDROCHLORIDE 0.5 MG: 1 INJECTION, SOLUTION INTRAMUSCULAR; INTRAVENOUS; SUBCUTANEOUS at 12:21

## 2024-02-29 RX ADMIN — ROCURONIUM BROMIDE 40 MG: 10 INJECTION, SOLUTION INTRAVENOUS at 09:05

## 2024-02-29 RX ADMIN — FENTANYL CITRATE 50 MCG: 50 INJECTION, SOLUTION INTRAMUSCULAR; INTRAVENOUS at 09:36

## 2024-02-29 RX ADMIN — WATER 2000 MG: 1 INJECTION INTRAMUSCULAR; INTRAVENOUS; SUBCUTANEOUS at 09:04

## 2024-02-29 RX ADMIN — FENTANYL CITRATE 25 MCG: 50 INJECTION, SOLUTION INTRAMUSCULAR; INTRAVENOUS at 10:42

## 2024-02-29 RX ADMIN — ONDANSETRON 4 MG: 2 INJECTION INTRAMUSCULAR; INTRAVENOUS at 10:01

## 2024-02-29 RX ADMIN — PROCHLORPERAZINE EDISYLATE 5 MG: 5 INJECTION INTRAMUSCULAR; INTRAVENOUS at 12:11

## 2024-02-29 RX ADMIN — ROCURONIUM BROMIDE 10 MG: 10 INJECTION, SOLUTION INTRAVENOUS at 09:50

## 2024-02-29 RX ADMIN — HYDROMORPHONE HYDROCHLORIDE 0.5 MG: 1 INJECTION, SOLUTION INTRAMUSCULAR; INTRAVENOUS; SUBCUTANEOUS at 10:50

## 2024-02-29 RX ADMIN — HYDROMORPHONE HYDROCHLORIDE 0.5 MG: 1 INJECTION, SOLUTION INTRAMUSCULAR; INTRAVENOUS; SUBCUTANEOUS at 11:11

## 2024-02-29 RX ADMIN — FENTANYL CITRATE 50 MCG: 50 INJECTION, SOLUTION INTRAMUSCULAR; INTRAVENOUS at 09:21

## 2024-02-29 RX ADMIN — FENTANYL CITRATE 25 MCG: 50 INJECTION, SOLUTION INTRAMUSCULAR; INTRAVENOUS at 10:31

## 2024-02-29 RX ADMIN — ACETAMINOPHEN 1000 MG: 500 TABLET ORAL at 08:35

## 2024-02-29 RX ADMIN — FENTANYL CITRATE 100 MCG: 50 INJECTION, SOLUTION INTRAMUSCULAR; INTRAVENOUS at 09:05

## 2024-02-29 RX ADMIN — INDOCYANINE GREEN AND WATER 2.5 MG: KIT at 08:48

## 2024-02-29 ASSESSMENT — PAIN DESCRIPTION - LOCATION
LOCATION: ABDOMEN

## 2024-02-29 ASSESSMENT — PAIN SCALES - GENERAL
PAINLEVEL_OUTOF10: 7
PAINLEVEL_OUTOF10: 9
PAINLEVEL_OUTOF10: 8
PAINLEVEL_OUTOF10: 10
PAINLEVEL_OUTOF10: 9
PAINLEVEL_OUTOF10: 2

## 2024-02-29 ASSESSMENT — PAIN DESCRIPTION - DESCRIPTORS
DESCRIPTORS: ACHING
DESCRIPTORS: SORE
DESCRIPTORS: ACHING;CRAMPING;STABBING
DESCRIPTORS: NAGGING
DESCRIPTORS: ACHING;SORE
DESCRIPTORS: ACHING;SHARP

## 2024-02-29 ASSESSMENT — PAIN DESCRIPTION - ORIENTATION
ORIENTATION: RIGHT;LEFT
ORIENTATION: INNER
ORIENTATION: RIGHT;MID
ORIENTATION: RIGHT
ORIENTATION: RIGHT

## 2024-02-29 ASSESSMENT — PAIN - FUNCTIONAL ASSESSMENT
PAIN_FUNCTIONAL_ASSESSMENT: WONG-BAKER FACES
PAIN_FUNCTIONAL_ASSESSMENT: 0-10
PAIN_FUNCTIONAL_ASSESSMENT: WONG-BAKER FACES

## 2024-02-29 ASSESSMENT — PAIN DESCRIPTION - ONSET: ONSET: ON-GOING

## 2024-02-29 ASSESSMENT — PAIN DESCRIPTION - PAIN TYPE
TYPE: ACUTE PAIN
TYPE: SURGICAL PAIN

## 2024-02-29 ASSESSMENT — PAIN DESCRIPTION - FREQUENCY: FREQUENCY: CONTINUOUS

## 2024-02-29 ASSESSMENT — LIFESTYLE VARIABLES: SMOKING_STATUS: 0

## 2024-02-29 NOTE — ANESTHESIA POSTPROCEDURE EVALUATION
Department of Anesthesiology  Postprocedure Note    Patient: Samira Nunez  MRN: [id number]  YOB: 1995  Date of evaluation: 2/29/2024    Procedure Summary       Date: 02/29/24 Room / Location: [address]    Anesthesia Start: 0854 Anesthesia Stop: 1043    Procedure: LAPAROSCOPIC ROBOTIC XI ASSISTED CHOLECYSTECTOMY WITH INDOCYANINE GREEN CHOLANGIOGRAM (Abdomen) Diagnosis:       Right upper quadrant pain      Biliary dyskinesia      (Right upper quadrant pain [R10.11])      (Biliary dyskinesia [K82.8])    Surgeons: Trav Zaidi DO Responsible Provider: Paolo Barragan DO    Anesthesia Type: MAC ASA Status: 3            Anesthesia Type: No value filed.    Grecia Phase I: Grecia Score: 9    Grecia Phase II:      Anesthesia Post Evaluation    Patient location during evaluation: bedside  Patient participation: complete - patient participated  Level of consciousness: awake  Pain score: 8  Airway patency: patent  Nausea & Vomiting: no vomiting and no nausea  Cardiovascular status: hemodynamically stable  Respiratory status: acceptable  Hydration status: stable  Comments: Seen and examined.  Pain out of proportion to the procedure performed.  Right anterior chest discomfort.  Mild subcutaneous emphysema on the shoulders which resolved.  CXR ordered to R/O pneumothorax which was normal.  Multimodal analgesia pain management approach  Pain management: adequate    No notable events documented.

## 2024-02-29 NOTE — ANESTHESIA PRE PROCEDURE
embolism.  2. Bilateral perihilar ground-glass infiltrates suggesting reactive airways  disease versus multi lobar bronchiolitis.     RECOMMENDATIONS:  Careful clinical correlation and follow up recommende    Anesthesia Evaluation  Patient summary reviewed and Nursing notes reviewed  Airway: Mallampati: III  TM distance: >3 FB   Neck ROM: full  Mouth opening: > = 3 FB   Dental: normal exam         Pulmonary:normal exam  breath sounds clear to auscultation  (+)     sleep apnea: on noncompliant,       asthma (Denies recent exacerbations or steroid usage):     (-) not a current smoker                           Cardiovascular:Negative CV ROS  Exercise tolerance: good (>4 METS)      (-) past MI, CAD, CABG/stent, dysrhythmias and  angina    ECG reviewed  Rhythm: regular  Rate: normal           Beta Blocker:  Not on Beta Blocker      ROS comment: EKG:  Normal sinus rhythm  Normal ECG  When compared with ECG of 20-FEB-2024 09:37,  No significant change was found       Neuro/Psych:   (+) headaches: migraine headaches, psychiatric history (ADHD):depression/anxiety    (-) seizures, TIA and CVA            ROS comment: Chronic fatigue GI/Hepatic/Renal:   (+) GERD: no interval change, liver disease (Transaminitis):, renal disease: kidney stones, morbid obesity (BMI 41 kg/m2)         ROS comment: Abdominal pain  Abdominal pain, epigastric  Elevated liver transaminase level  Epigastric pain    .   Endo/Other:    (+) : arthritis: OA..    (-) blood dyscrasiaDiabetes: Subclinical DMII - A1 C 6.3%.        Pt had no PAT visit        ROS comment: PCOS Abdominal:   (+) obese          Vascular: negative vascular ROS.    - DVT and PE.      Other Findings:             Anesthesia Plan      MAC     ASA 3     (S/P EGD on 2/28/24  Modified RSI with HOB at 30 degrees RT  Pre-oxygenation x 3 minutes  20mg Ketamine  PONV prophylaxis)  Induction: intravenous.      Anesthetic plan and risks discussed with patient.      Plan discussed with

## 2024-02-29 NOTE — DISCHARGE INSTRUCTIONS
Instructions After Abdominal Surgery    You had abdominal surgery at [facility].  Please follow the instructions on this sheet for your follow-up care and make an appointment for a follow-up visit.    Activity/Sleep/Return to Work  Unless instructed otherwise, you may walk, climb stairs, ride as a passenger in a car, and engage in other activities of daily living as tolerated.  It is normal to feel fatigued and require more sleep than usual during your recovery.  Also, major surgery and being in the hospital can disrupt sleep patterns.  We recommend allowing sleep patterns to return to normal over time and avoid sleep medication unless previously prescribed.  Avoid heavy lifting (> 15-20 pounds or more) for 4 weeks.  Avoid driving for 1 week or as long as you have pain and are taking narcotic pain medication.  You may resume work as tolerated unless instructed otherwise or if your work involves heavy lifting.  Please bring any forms related to work, insurance, or disability to your first postoperative visit.    Bowel Movements  It is common to have irregular, loose watery stools for several days after abdominal surgery.  If watery diarrhea lasts more than a few days or you have more than 8 large volume liquid bowel movements in one day then call the office.  You may have altered bacteria in your colon and need a prescription for an antibiotic.  Avoid caffeine and alcohol (they alter bowel activity and can contribute to diarrhea or constipation).  You may take Mineral Oil 1 tablespoon twice daily to soften your stool.  If you have diarrhea, stop this medication.  If you have constipation and feel uncomfortable, then please call the office during office hours.    Urination  Patients who had a urinary catheter (“Rasheed”) placed for surgery often have minor discomfort with urination for several days after removal of the catheter.  If discomfort persists or worsens, an infection may have occurred and

## 2024-02-29 NOTE — INTERVAL H&P NOTE
Update History & Physical    The patient's History and Physical of February 16, 2024 was reviewed with the patient and I examined the patient. She has elected to proceed with cholecystectomy for biliary hyperkinesia since EGD was negative. The surgical site was confirmed by the patient and me.     Plan: The risks, benefits, expected outcome, and alternative to the recommended procedure have been discussed with the patient. Patient understands and wants to proceed with the procedure.     Electronically signed by Trav Zaidi DO on 2/29/2024 at 8:56 AM

## 2024-02-29 NOTE — PROGRESS NOTES
PT RECEIVED IN PACU FROM SURGERY , MOANING IN PAIN O2 40 % SIMPLE MASK WILL WEAN AS TOLERATED , P T MEDICATED ON ARRIVAL TO PACU WITH FENTANYL , PT STATES FENTANYL MAKES MY PAIN WORSE , WHEN ASKED WHAT HELPS STATES DILAUDID  1050 PT COMPLAINING OF MID UPPER ABDOMINAL TO MID CHEST PAIN , VSS REASSURANCE GIVEN ,MOVING TOWARD RIGHT SHOULDER DR SANFORD HERE AND AWAR UPDATED PT ON SURGERY , MEDICATING PT FOR PAIN   1110 VSS, PT COMPLAINING PAIN IN CHEST TO RIGHT SHOULDER NO CHANGES ON HEART MONITOR DR SANDY AWAR , NO CREPITEDNESS NOTED   1125 PT STATES ABD AND CHEST BOTH HURTING , MORE SLIGHT CREPITEDNESS NOTED AROUND COLLAR BONE STAT CXRAY ORDERED  1140 CXRAY DONE AT BS , PT ON AND OFF BEDPAN VOIDED BED LINEN CHANGED  1230 STILL NO CXRAY REPORT CALLED XRAY   1250 XRAY RESULTS BACK DR SANDY UPDATED PT TOLERATES OFF O2 PT OK TO PROGRESS TO PACU 2

## 2024-02-29 NOTE — OP NOTE
OPERATIVE REPORT    DATE OF PROCEDURE:  2/29/2024     SURGEON:  Surgeon(s):  Trav Zaidi DO     OR STAFF:  Circulator: Melisa Hill RN  Scrub Person First: Arelis Rodriguez     PRE-OPERATIVE DIAGNOSIS:  Biliary dyskinesia    POST-OPERATIVE DIAGNOSIS:  Chronic cholecystitis with cholelithiasis    OPERATION(S) PERFORMED:  Robotic XI assist laparoscopic cholecystectomy with ICG cholangiography    ANESTHESIA TYPE:  General     COMPLICATIONS:  None apparent    WOUND CLASS:  Class II    ESTIMATED BLOOD LOSS:  20 cc    SPECIMENS:  ID Type Source Tests Collected by Time Destination   A : GALLBLADDER AND CONTENTS Tissue Gallbladder SURGICAL PATHOLOGY Trav Zaidi DO 2/29/2024 0929         DRAINS/ITEMS PLACED:  None    OPERATIVE INDICATIONS:  Samira Nunez is a 28 y.o. female who presented to my office with chronic intermittent abdominal pain, nausea, and vomiting. An extensive workup was done including US, CT scans, HIDA scan, gastric emptying study, and EGD. The only finding was a borderline elevated gallbladder ejection fraction. I told her that the last step was to perform cholecystectomy in hopes that it rids her of her symptoms. I have explained the risks, benefits, alternatives, and potential complications associated with the proposed procedure to be performed and other issues when applicable with the patient/responsible party prior to the procedure. All of their questions were answered as appropriate and to their satisfaction. They understand and agree to the procedure.      OPERATIVE FINDINGS:  Chronic adhesions from omentum to gallbladder indicating chronic cholecystitis  Cholelithiasis    OPERATIVE DETAILS:  The patient was brought to the operating room and placed on the operating table in the supine position with all pressure points padded appropriately. Antibiotics were administered per SCIP criteria. Sequential compression devices were applied to both legs and were confirmed to be on and

## 2024-03-01 ENCOUNTER — APPOINTMENT (OUTPATIENT)
Dept: CT IMAGING | Age: 29
End: 2024-03-01

## 2024-03-01 ENCOUNTER — HOSPITAL ENCOUNTER (OUTPATIENT)
Age: 29
Setting detail: OBSERVATION
Discharge: HOME | End: 2024-03-02
Attending: EMERGENCY MEDICINE | Admitting: SURGERY

## 2024-03-01 DIAGNOSIS — Z90.49 S/P CHOLECYSTECTOMY: Primary | ICD-10-CM

## 2024-03-01 DIAGNOSIS — R10.13 EPIGASTRIC PAIN: ICD-10-CM

## 2024-03-01 LAB
ALBUMIN SERPL-MCNC: 4.1 G/DL (ref 3.5–5.2)
ALP SERPL-CCNC: 156 U/L (ref 35–104)
ALT SERPL-CCNC: 93 U/L (ref 0–32)
ANION GAP SERPL CALCULATED.3IONS-SCNC: 12 MMOL/L (ref 7–16)
AST SERPL-CCNC: 176 U/L (ref 0–31)
BASOPHILS # BLD: 0.01 K/UL (ref 0–0.2)
BASOPHILS NFR BLD: 0 % (ref 0–2)
BILIRUB DIRECT SERPL-MCNC: 0.8 MG/DL (ref 0–0.3)
BILIRUB INDIRECT SERPL-MCNC: 0.1 MG/DL (ref 0–1)
BILIRUB SERPL-MCNC: 0.9 MG/DL (ref 0–1.2)
BUN SERPL-MCNC: 6 MG/DL (ref 6–20)
CALCIUM SERPL-MCNC: 9.2 MG/DL (ref 8.6–10.2)
CHLORIDE SERPL-SCNC: 105 MMOL/L (ref 98–107)
CO2 SERPL-SCNC: 22 MMOL/L (ref 22–29)
CREAT SERPL-MCNC: 0.8 MG/DL (ref 0.5–1)
EKG ATRIAL RATE: 90 BPM
EKG P AXIS: 69 DEGREES
EKG P-R INTERVAL: 136 MS
EKG Q-T INTERVAL: 358 MS
EKG QRS DURATION: 72 MS
EKG QTC CALCULATION (BAZETT): 437 MS
EKG R AXIS: 37 DEGREES
EKG T AXIS: 35 DEGREES
EKG VENTRICULAR RATE: 90 BPM
EOSINOPHIL # BLD: 0.01 K/UL (ref 0.05–0.5)
EOSINOPHILS RELATIVE PERCENT: 0 % (ref 0–6)
ERYTHROCYTE [DISTWIDTH] IN BLOOD BY AUTOMATED COUNT: 13.4 % (ref 11.5–15)
GFR SERPL CREATININE-BSD FRML MDRD: >60 ML/MIN/1.73M2
GLUCOSE SERPL-MCNC: 138 MG/DL (ref 74–99)
HCG SERPL QL: NEGATIVE
HCT VFR BLD AUTO: 37.6 % (ref 34–48)
HGB BLD-MCNC: 12.4 G/DL (ref 11.5–15.5)
IMM GRANULOCYTES # BLD AUTO: 0.03 K/UL (ref 0–0.58)
IMM GRANULOCYTES NFR BLD: 0 % (ref 0–5)
LACTATE BLDV-SCNC: 1.8 MMOL/L (ref 0.5–2.2)
LIPASE SERPL-CCNC: 23 U/L (ref 13–60)
LYMPHOCYTES NFR BLD: 1.94 K/UL (ref 1.5–4)
LYMPHOCYTES RELATIVE PERCENT: 19 % (ref 20–42)
MAGNESIUM SERPL-MCNC: 1.9 MG/DL (ref 1.6–2.6)
MCH RBC QN AUTO: 28.1 PG (ref 26–35)
MCHC RBC AUTO-ENTMCNC: 33 G/DL (ref 32–34.5)
MCV RBC AUTO: 85.3 FL (ref 80–99.9)
MONOCYTES NFR BLD: 0.87 K/UL (ref 0.1–0.95)
MONOCYTES NFR BLD: 8 % (ref 2–12)
NEUTROPHILS NFR BLD: 72 % (ref 43–80)
NEUTS SEG NFR BLD: 7.53 K/UL (ref 1.8–7.3)
PLATELET # BLD AUTO: 396 K/UL (ref 130–450)
PMV BLD AUTO: 10.3 FL (ref 7–12)
POTASSIUM SERPL-SCNC: 3.7 MMOL/L (ref 3.5–5)
PROT SERPL-MCNC: 7.1 G/DL (ref 6.4–8.3)
RBC # BLD AUTO: 4.41 M/UL (ref 3.5–5.5)
SODIUM SERPL-SCNC: 139 MMOL/L (ref 132–146)
WBC OTHER # BLD: 10.4 K/UL (ref 4.5–11.5)

## 2024-03-01 PROCEDURE — 74177 CT ABD & PELVIS W/CONTRAST: CPT

## 2024-03-01 PROCEDURE — 6360000002 HC RX W HCPCS

## 2024-03-01 PROCEDURE — 96372 THER/PROPH/DIAG INJ SC/IM: CPT

## 2024-03-01 PROCEDURE — 6370000000 HC RX 637 (ALT 250 FOR IP)

## 2024-03-01 PROCEDURE — 6360000004 HC RX CONTRAST MEDICATION: Performed by: RADIOLOGY

## 2024-03-01 PROCEDURE — 96376 TX/PRO/DX INJ SAME DRUG ADON: CPT

## 2024-03-01 PROCEDURE — 6360000002 HC RX W HCPCS: Performed by: STUDENT IN AN ORGANIZED HEALTH CARE EDUCATION/TRAINING PROGRAM

## 2024-03-01 PROCEDURE — 85025 COMPLETE CBC W/AUTO DIFF WBC: CPT

## 2024-03-01 PROCEDURE — 80053 COMPREHEN METABOLIC PANEL: CPT

## 2024-03-01 PROCEDURE — 2580000003 HC RX 258: Performed by: STUDENT IN AN ORGANIZED HEALTH CARE EDUCATION/TRAINING PROGRAM

## 2024-03-01 PROCEDURE — C9113 INJ PANTOPRAZOLE SODIUM, VIA: HCPCS

## 2024-03-01 PROCEDURE — 99024 POSTOP FOLLOW-UP VISIT: CPT | Performed by: SURGERY

## 2024-03-01 PROCEDURE — 83605 ASSAY OF LACTIC ACID: CPT

## 2024-03-01 PROCEDURE — 93005 ELECTROCARDIOGRAM TRACING: CPT | Performed by: STUDENT IN AN ORGANIZED HEALTH CARE EDUCATION/TRAINING PROGRAM

## 2024-03-01 PROCEDURE — 96375 TX/PRO/DX INJ NEW DRUG ADDON: CPT

## 2024-03-01 PROCEDURE — 2580000003 HC RX 258

## 2024-03-01 PROCEDURE — 82248 BILIRUBIN DIRECT: CPT

## 2024-03-01 PROCEDURE — 83690 ASSAY OF LIPASE: CPT

## 2024-03-01 PROCEDURE — G0378 HOSPITAL OBSERVATION PER HR: HCPCS

## 2024-03-01 PROCEDURE — 84703 CHORIONIC GONADOTROPIN ASSAY: CPT

## 2024-03-01 PROCEDURE — 93010 ELECTROCARDIOGRAM REPORT: CPT | Performed by: INTERNAL MEDICINE

## 2024-03-01 PROCEDURE — 99285 EMERGENCY DEPT VISIT HI MDM: CPT

## 2024-03-01 PROCEDURE — 96374 THER/PROPH/DIAG INJ IV PUSH: CPT

## 2024-03-01 PROCEDURE — 83735 ASSAY OF MAGNESIUM: CPT

## 2024-03-01 PROCEDURE — 6360000002 HC RX W HCPCS: Performed by: EMERGENCY MEDICINE

## 2024-03-01 RX ORDER — LORATADINE 10 MG/1
10 TABLET ORAL DAILY PRN
COMMUNITY

## 2024-03-01 RX ORDER — ACETAMINOPHEN 325 MG/1
650 TABLET ORAL 4 TIMES DAILY
COMMUNITY

## 2024-03-01 RX ORDER — OXYCODONE HYDROCHLORIDE 5 MG/1
5 TABLET ORAL EVERY 4 HOURS PRN
Status: DISCONTINUED | OUTPATIENT
Start: 2024-03-01 | End: 2024-03-02 | Stop reason: HOSPADM

## 2024-03-01 RX ORDER — PANTOPRAZOLE SODIUM 40 MG/10ML
40 INJECTION, POWDER, LYOPHILIZED, FOR SOLUTION INTRAVENOUS ONCE
Status: COMPLETED | OUTPATIENT
Start: 2024-03-01 | End: 2024-03-01

## 2024-03-01 RX ORDER — 0.9 % SODIUM CHLORIDE 0.9 %
1000 INTRAVENOUS SOLUTION INTRAVENOUS ONCE
Status: COMPLETED | OUTPATIENT
Start: 2024-03-01 | End: 2024-03-01

## 2024-03-01 RX ORDER — FENTANYL CITRATE 50 UG/ML
50 INJECTION, SOLUTION INTRAMUSCULAR; INTRAVENOUS ONCE
Status: DISCONTINUED | OUTPATIENT
Start: 2024-03-01 | End: 2024-03-01

## 2024-03-01 RX ORDER — SODIUM CHLORIDE 9 MG/ML
INJECTION, SOLUTION INTRAVENOUS PRN
Status: DISCONTINUED | OUTPATIENT
Start: 2024-03-01 | End: 2024-03-02 | Stop reason: HOSPADM

## 2024-03-01 RX ORDER — MORPHINE SULFATE 4 MG/ML
4 INJECTION, SOLUTION INTRAMUSCULAR; INTRAVENOUS ONCE
Status: COMPLETED | OUTPATIENT
Start: 2024-03-01 | End: 2024-03-01

## 2024-03-01 RX ORDER — ACETAMINOPHEN 325 MG/1
650 TABLET ORAL EVERY 6 HOURS
Status: DISCONTINUED | OUTPATIENT
Start: 2024-03-01 | End: 2024-03-02 | Stop reason: HOSPADM

## 2024-03-01 RX ORDER — ONDANSETRON 4 MG/1
4 TABLET, ORALLY DISINTEGRATING ORAL EVERY 8 HOURS PRN
Status: DISCONTINUED | OUTPATIENT
Start: 2024-03-01 | End: 2024-03-02 | Stop reason: HOSPADM

## 2024-03-01 RX ORDER — RIZATRIPTAN BENZOATE 5 MG/1
5 TABLET ORAL PRN
COMMUNITY

## 2024-03-01 RX ORDER — ALBUTEROL SULFATE 1.25 MG/3ML
1 SOLUTION RESPIRATORY (INHALATION)
COMMUNITY

## 2024-03-01 RX ORDER — SODIUM CHLORIDE 0.9 % (FLUSH) 0.9 %
10 SYRINGE (ML) INJECTION PRN
Status: DISCONTINUED | OUTPATIENT
Start: 2024-03-01 | End: 2024-03-02 | Stop reason: HOSPADM

## 2024-03-01 RX ORDER — ENOXAPARIN SODIUM 100 MG/ML
30 INJECTION SUBCUTANEOUS 2 TIMES DAILY
Status: DISCONTINUED | OUTPATIENT
Start: 2024-03-01 | End: 2024-03-02 | Stop reason: HOSPADM

## 2024-03-01 RX ORDER — SODIUM CHLORIDE 0.9 % (FLUSH) 0.9 %
10 SYRINGE (ML) INJECTION EVERY 12 HOURS SCHEDULED
Status: DISCONTINUED | OUTPATIENT
Start: 2024-03-01 | End: 2024-03-02 | Stop reason: HOSPADM

## 2024-03-01 RX ORDER — KETOROLAC TROMETHAMINE 30 MG/ML
30 INJECTION, SOLUTION INTRAMUSCULAR; INTRAVENOUS EVERY 6 HOURS
Status: COMPLETED | OUTPATIENT
Start: 2024-03-01 | End: 2024-03-02

## 2024-03-01 RX ORDER — ONDANSETRON 2 MG/ML
4 INJECTION INTRAMUSCULAR; INTRAVENOUS EVERY 6 HOURS PRN
Status: DISCONTINUED | OUTPATIENT
Start: 2024-03-01 | End: 2024-03-02 | Stop reason: HOSPADM

## 2024-03-01 RX ORDER — DICYCLOMINE HYDROCHLORIDE 10 MG/1
10 CAPSULE ORAL 2 TIMES DAILY WITH MEALS
COMMUNITY
Start: 2024-02-23

## 2024-03-01 RX ORDER — DEXTROAMPHETAMINE SACCHARATE, AMPHETAMINE ASPARTATE, DEXTROAMPHETAMINE SULFATE AND AMPHETAMINE SULFATE 5; 5; 5; 5 MG/1; MG/1; MG/1; MG/1
20 TABLET ORAL 2 TIMES DAILY
COMMUNITY

## 2024-03-01 RX ORDER — ALBUTEROL SULFATE 90 UG/1
2 AEROSOL, METERED RESPIRATORY (INHALATION) EVERY 4 HOURS PRN
COMMUNITY

## 2024-03-01 RX ORDER — PAROXETINE HYDROCHLORIDE 20 MG/1
20 TABLET, FILM COATED ORAL NIGHTLY
COMMUNITY

## 2024-03-01 RX ORDER — ONDANSETRON 2 MG/ML
4 INJECTION INTRAMUSCULAR; INTRAVENOUS ONCE
Status: COMPLETED | OUTPATIENT
Start: 2024-03-01 | End: 2024-03-01

## 2024-03-01 RX ORDER — PANTOPRAZOLE SODIUM 40 MG/1
40 TABLET, DELAYED RELEASE ORAL EVERY MORNING
COMMUNITY

## 2024-03-01 RX ORDER — FLUTICASONE PROPIONATE 50 MCG
2 SPRAY, SUSPENSION (ML) NASAL DAILY PRN
COMMUNITY

## 2024-03-01 RX ADMIN — KETOROLAC TROMETHAMINE 30 MG: 30 INJECTION, SOLUTION INTRAMUSCULAR; INTRAVENOUS at 12:15

## 2024-03-01 RX ADMIN — SODIUM CHLORIDE, PRESERVATIVE FREE 10 ML: 5 INJECTION INTRAVENOUS at 20:31

## 2024-03-01 RX ADMIN — KETOROLAC TROMETHAMINE 30 MG: 30 INJECTION, SOLUTION INTRAMUSCULAR; INTRAVENOUS at 18:07

## 2024-03-01 RX ADMIN — SODIUM CHLORIDE 1000 ML: 9 INJECTION, SOLUTION INTRAVENOUS at 06:04

## 2024-03-01 RX ADMIN — POTASSIUM BICARBONATE 40 MEQ: 782 TABLET, EFFERVESCENT ORAL at 12:16

## 2024-03-01 RX ADMIN — MORPHINE SULFATE 4 MG: 4 INJECTION, SOLUTION INTRAMUSCULAR; INTRAVENOUS at 06:36

## 2024-03-01 RX ADMIN — OXYCODONE 5 MG: 5 TABLET ORAL at 20:30

## 2024-03-01 RX ADMIN — ONDANSETRON 4 MG: 2 INJECTION INTRAMUSCULAR; INTRAVENOUS at 06:36

## 2024-03-01 RX ADMIN — MORPHINE SULFATE 4 MG: 4 INJECTION, SOLUTION INTRAMUSCULAR; INTRAVENOUS at 11:19

## 2024-03-01 RX ADMIN — ACETAMINOPHEN 650 MG: 325 TABLET ORAL at 18:09

## 2024-03-01 RX ADMIN — IOPAMIDOL 75 ML: 755 INJECTION, SOLUTION INTRAVENOUS at 09:27

## 2024-03-01 RX ADMIN — ENOXAPARIN SODIUM 30 MG: 100 INJECTION SUBCUTANEOUS at 13:25

## 2024-03-01 RX ADMIN — ACETAMINOPHEN 650 MG: 325 TABLET ORAL at 13:25

## 2024-03-01 RX ADMIN — MORPHINE SULFATE 4 MG: 4 INJECTION, SOLUTION INTRAMUSCULAR; INTRAVENOUS at 06:02

## 2024-03-01 RX ADMIN — PANTOPRAZOLE SODIUM 40 MG: 40 INJECTION, POWDER, FOR SOLUTION INTRAVENOUS at 08:28

## 2024-03-01 RX ADMIN — OXYCODONE 5 MG: 5 TABLET ORAL at 13:25

## 2024-03-01 ASSESSMENT — ENCOUNTER SYMPTOMS
VOMITING: 0
COUGH: 0
CHEST TIGHTNESS: 0
ABDOMINAL PAIN: 1
NAUSEA: 0
SHORTNESS OF BREATH: 0
DIARRHEA: 0

## 2024-03-01 ASSESSMENT — PAIN DESCRIPTION - ORIENTATION
ORIENTATION: LEFT;RIGHT
ORIENTATION: RIGHT;LEFT
ORIENTATION: LEFT

## 2024-03-01 ASSESSMENT — PAIN SCALES - GENERAL
PAINLEVEL_OUTOF10: 7
PAINLEVEL_OUTOF10: 6
PAINLEVEL_OUTOF10: 8
PAINLEVEL_OUTOF10: 10
PAINLEVEL_OUTOF10: 4
PAINLEVEL_OUTOF10: 6
PAINLEVEL_OUTOF10: 7
PAINLEVEL_OUTOF10: 10
PAINLEVEL_OUTOF10: 7

## 2024-03-01 ASSESSMENT — PAIN DESCRIPTION - DESCRIPTORS
DESCRIPTORS: ACHING;DISCOMFORT;TENDER;SORE
DESCRIPTORS: STABBING;CRAMPING
DESCRIPTORS: STABBING

## 2024-03-01 ASSESSMENT — PAIN DESCRIPTION - LOCATION
LOCATION: ABDOMEN
LOCATION: RIB CAGE;ABDOMEN
LOCATION: ABDOMEN;RIB CAGE
LOCATION: ABDOMEN;RIB CAGE
LOCATION: ABDOMEN

## 2024-03-01 ASSESSMENT — PAIN - FUNCTIONAL ASSESSMENT
PAIN_FUNCTIONAL_ASSESSMENT: PREVENTS OR INTERFERES WITH ALL ACTIVE AND SOME PASSIVE ACTIVITIES
PAIN_FUNCTIONAL_ASSESSMENT: PREVENTS OR INTERFERES SOME ACTIVE ACTIVITIES AND ADLS
PAIN_FUNCTIONAL_ASSESSMENT: PREVENTS OR INTERFERES WITH ALL ACTIVE AND SOME PASSIVE ACTIVITIES
PAIN_FUNCTIONAL_ASSESSMENT: 0-10

## 2024-03-01 NOTE — PROGRESS NOTES
Database initiated. Patient is A&O independent from home with significant other. States she uses no assistive devices and is RA at baseline but just getting started using a c-pap.

## 2024-03-01 NOTE — ED PROVIDER NOTES
Samira Nunez is a 28 y.o. female    HPI  Samira Nunez is a 28 y.o. female presenting to the ED for Abdominal Pain (Abdominal pain  gallbladder removed yesterday)    History comes primarily from the patient.  Patient presents to the emergency department for epigastric and LUQ abdominal pain. The patient had a laparoscopic cholecystectomy yesterday. She says the pain is associated with only mild nausea and vomiting. She says the pain is the same pain she was having before the surgery. She is passing gas since the surgery but has not had a bowel movement. Patient does not appear to have been prescribed any oral narcotic pain medications from the surgery. Surgeon was Dr. Zaidi.      ROS  Full review of systems completed.  Pertinent positives and negatives per the HPI, unless otherwise stated ROS is negative.    Physical Exam  Vitals and nursing note reviewed.   Constitutional:       General: She is in acute distress (pain).      Appearance: Normal appearance. She is well-developed. She is obese. She is not ill-appearing.   HENT:      Head: Normocephalic and atraumatic.      Right Ear: External ear normal.      Left Ear: External ear normal.      Nose: Nose normal. No rhinorrhea.      Mouth/Throat:      Mouth: Mucous membranes are moist.      Pharynx: Oropharynx is clear.   Eyes:      Extraocular Movements: Extraocular movements intact.      Conjunctiva/sclera: Conjunctivae normal.      Pupils: Pupils are equal, round, and reactive to light.   Cardiovascular:      Rate and Rhythm: Normal rate and regular rhythm.      Pulses: Normal pulses.      Heart sounds: Normal heart sounds. No murmur heard.  Pulmonary:      Effort: Pulmonary effort is normal. No respiratory distress.      Breath sounds: Normal breath sounds. No wheezing.   Abdominal:      General: Abdomen is flat. There is no distension.      Palpations: Abdomen is soft.      Tenderness: There is abdominal tenderness in the epigastric area and left upper  administration in time range)   0.9 % sodium chloride infusion (has no administration in time range)   ondansetron (ZOFRAN-ODT) disintegrating tablet 4 mg (has no administration in time range)     Or   ondansetron (ZOFRAN) injection 4 mg (has no administration in time range)   enoxaparin Sodium (LOVENOX) injection 30 mg (30 mg SubCUTAneous Given 3/1/24 1325)   acetaminophen (TYLENOL) tablet 650 mg (650 mg Oral Given 3/1/24 1325)   oxyCODONE (ROXICODONE) immediate release tablet 5 mg (5 mg Oral Given 3/1/24 1325)   ketorolac (TORADOL) injection 30 mg (30 mg IntraVENous Given 3/1/24 1215)   sodium chloride 0.9 % bolus 1,000 mL (0 mLs IntraVENous Stopped 3/1/24 0833)   morphine sulfate (PF) injection 4 mg (4 mg IntraVENous Given 3/1/24 0602)   morphine sulfate (PF) injection 4 mg (4 mg IntraVENous Given 3/1/24 0636)   ondansetron (ZOFRAN) injection 4 mg (4 mg IntraVENous Given 3/1/24 0636)   pantoprazole (PROTONIX) injection 40 mg (40 mg IntraVENous Given 3/1/24 0828)   iopamidol (ISOVUE-370) 76 % injection 75 mL (75 mLs IntraVENous Given 3/1/24 0927)   morphine sulfate (PF) injection 4 mg (4 mg IntraVENous Given 3/1/24 1119)   potassium bicarb-citric acid (EFFER-K) effervescent tablet 40 mEq (40 mEq Oral Given 3/1/24 1216)         Discussion: Patient presents for epigastric and left upper quadrant pain 1 day status post cholecystectomy.  She says this is the same pain she had before the cholecystectomy and that has not been alleviated with the surgery.  The patient did require multiple doses of pain medication and she was also given fluids.  LFTs were mildly abnormal, probably consistent with 1 day post gallbladder surgery.  Otherwise, no significant findings on laboratory workup and CT scan was unrevealing.  We did consult surgery who decided to admit the patient patient further management.                  Past medical history/chonic conditions affecting care   has a past medical history of Asthma, Chronic fatigue,

## 2024-03-01 NOTE — ED NOTES
ED to Inpatient Handoff Report    Notified RN that electronic handoff available and patient ready for transport to room 742.    Safety Risks: None identified    Patient in Restraints: no    Constant Observer or Patient : no    Telemetry Monitoring Ordered: No          Order to transfer to unit without monitor: NA    Last MEWS: 1 Time completed: 1216    Deterioration Index: 14.08    Vitals:    03/01/24 0950 03/01/24 1050 03/01/24 1119 03/01/24 1216   BP: 115/70 125/84  (!) 143/92   Pulse: 73 77  90   Resp: 15 24 17 23   Temp:    98.1 °F (36.7 °C)   TempSrc:       SpO2: 98% 99%  98%   Weight:       Height:           Opportunity for questions and clarification was provided.

## 2024-03-01 NOTE — CARE COORDINATION
Met with patient about diagnosis and discharge plan of care. Pt admit for abdominal pain/epigastric pain. Pt just had EGD on Wednesday 2/28 and lap cholecystectomy, yesterday, 2/29. Low fat diet, surgical consult. Pt lives with spouse in ranch home. Independent prior to admit. Pt has new c-pap. Pt has no insurance as of today. I spoke with Andrew from public benefits and she is attempting to reach out to pt. Will await call back. Pt declining needs. PCP is Dr Carroll

## 2024-03-01 NOTE — H&P
GENERAL SURGERY  HISTORY AND PHYSICAL  3/1/2024      ARNOLDO Nunez is a 28 y.o. female who presents to the general surgery service for evaluation of abdominal pain after she had her gallbladder removed yesterday. She states this pain feels the same as before surgery. She had undergone EGD and scans to evaluate and it was decided to take her gallbladder out to see if her pain resolved. She states the pain is present in her shoulders and rib cage as well as generalized abdominal pain. She states she was able to tolerate broths for the last two days without any nausea or vomiting. Denies diarrhea.    CT shows post surgical changes to gallbladder fossa without any clear evidence of bile leak    Pain is the same pain as what she felt previously before along with post operative pain from insufflation and muscular soreness.      Past Medical History:   Diagnosis Date    Asthma     Chronic fatigue     Class 3 severe obesity due to excess calories with serious comorbidity and body mass index (BMI) of 50.0 to 59.9 in adult ([facility])     COVID-19 12/01/2023    Elevated liver transaminase level 09/17/2021    Kidney stone     Migraine without aura 09/17/2021    PCOS (polycystic ovarian syndrome)     Sleep apnea     non compliant with cpap       Past Surgical History:   Procedure Laterality Date    CHOLECYSTECTOMY, LAPAROSCOPIC N/A 2/29/2024    LAPAROSCOPIC ROBOTIC XI ASSISTED CHOLECYSTECTOMY WITH INDOCYANINE GREEN CHOLANGIOGRAM performed by Trav Zaidi DO at [facility] OR    UPPER GASTROINTESTINAL ENDOSCOPY N/A 2/2/2024    EGD ESOPHAGOGASTRODUODENOSCOPY performed by Trav Zaidi DO at [facility] ENDOSCOPY    UPPER GASTROINTESTINAL ENDOSCOPY N/A 2/28/2024    EGD ESOPHAGOGASTRODUODENOSCOPY performed by Trav Zaidi DO at [facility] ENDOSCOPY       Medications Prior to Admission:    Prior to Admission medications    Medication Sig Start Date End Date Taking? Authorizing Provider   dicyclomine (BENTYL) 10 MG capsule Take 1 capsule by

## 2024-03-02 VITALS
WEIGHT: 250 LBS | RESPIRATION RATE: 17 BRPM | OXYGEN SATURATION: 99 % | SYSTOLIC BLOOD PRESSURE: 120 MMHG | BODY MASS INDEX: 41.65 KG/M2 | HEART RATE: 75 BPM | TEMPERATURE: 97.7 F | HEIGHT: 65 IN | DIASTOLIC BLOOD PRESSURE: 59 MMHG

## 2024-03-02 LAB
ALBUMIN SERPL-MCNC: 3.7 G/DL (ref 3.5–5.2)
ALP SERPL-CCNC: 119 U/L (ref 35–104)
ALT SERPL-CCNC: 71 U/L (ref 0–32)
ANION GAP SERPL CALCULATED.3IONS-SCNC: 11 MMOL/L (ref 7–16)
AST SERPL-CCNC: 32 U/L (ref 0–31)
BASOPHILS # BLD: 0.03 K/UL (ref 0–0.2)
BASOPHILS NFR BLD: 1 % (ref 0–2)
BILIRUB DIRECT SERPL-MCNC: <0.2 MG/DL (ref 0–0.3)
BILIRUB INDIRECT SERPL-MCNC: ABNORMAL MG/DL (ref 0–1)
BILIRUB SERPL-MCNC: 0.2 MG/DL (ref 0–1.2)
BUN SERPL-MCNC: 7 MG/DL (ref 6–20)
CALCIUM SERPL-MCNC: 8.9 MG/DL (ref 8.6–10.2)
CHLORIDE SERPL-SCNC: 106 MMOL/L (ref 98–107)
CO2 SERPL-SCNC: 23 MMOL/L (ref 22–29)
CREAT SERPL-MCNC: 0.8 MG/DL (ref 0.5–1)
EOSINOPHIL # BLD: 0.06 K/UL (ref 0.05–0.5)
EOSINOPHILS RELATIVE PERCENT: 1 % (ref 0–6)
ERYTHROCYTE [DISTWIDTH] IN BLOOD BY AUTOMATED COUNT: 13.1 % (ref 11.5–15)
GFR SERPL CREATININE-BSD FRML MDRD: >60 ML/MIN/1.73M2
GLUCOSE SERPL-MCNC: 102 MG/DL (ref 74–99)
HCT VFR BLD AUTO: 34.5 % (ref 34–48)
HGB BLD-MCNC: 11.3 G/DL (ref 11.5–15.5)
IMM GRANULOCYTES # BLD AUTO: <0.03 K/UL (ref 0–0.58)
IMM GRANULOCYTES NFR BLD: 0 % (ref 0–5)
LYMPHOCYTES NFR BLD: 2.97 K/UL (ref 1.5–4)
LYMPHOCYTES RELATIVE PERCENT: 45 % (ref 20–42)
MCH RBC QN AUTO: 28.3 PG (ref 26–35)
MCHC RBC AUTO-ENTMCNC: 32.8 G/DL (ref 32–34.5)
MCV RBC AUTO: 86.3 FL (ref 80–99.9)
MONOCYTES NFR BLD: 0.51 K/UL (ref 0.1–0.95)
MONOCYTES NFR BLD: 8 % (ref 2–12)
NEUTROPHILS NFR BLD: 45 % (ref 43–80)
NEUTS SEG NFR BLD: 2.96 K/UL (ref 1.8–7.3)
PLATELET # BLD AUTO: 350 K/UL (ref 130–450)
PMV BLD AUTO: 9.9 FL (ref 7–12)
POTASSIUM SERPL-SCNC: 4 MMOL/L (ref 3.5–5)
PROT SERPL-MCNC: 5.9 G/DL (ref 6.4–8.3)
RBC # BLD AUTO: 4 M/UL (ref 3.5–5.5)
SODIUM SERPL-SCNC: 140 MMOL/L (ref 132–146)
WBC OTHER # BLD: 6.5 K/UL (ref 4.5–11.5)

## 2024-03-02 PROCEDURE — 85025 COMPLETE CBC W/AUTO DIFF WBC: CPT

## 2024-03-02 PROCEDURE — 82248 BILIRUBIN DIRECT: CPT

## 2024-03-02 PROCEDURE — 80053 COMPREHEN METABOLIC PANEL: CPT

## 2024-03-02 PROCEDURE — 99024 POSTOP FOLLOW-UP VISIT: CPT | Performed by: SURGERY

## 2024-03-02 PROCEDURE — 6360000002 HC RX W HCPCS

## 2024-03-02 PROCEDURE — 96376 TX/PRO/DX INJ SAME DRUG ADON: CPT

## 2024-03-02 PROCEDURE — 96372 THER/PROPH/DIAG INJ SC/IM: CPT

## 2024-03-02 PROCEDURE — 6370000000 HC RX 637 (ALT 250 FOR IP)

## 2024-03-02 PROCEDURE — 2580000003 HC RX 258

## 2024-03-02 PROCEDURE — G0378 HOSPITAL OBSERVATION PER HR: HCPCS

## 2024-03-02 RX ADMIN — SODIUM CHLORIDE, PRESERVATIVE FREE 10 ML: 5 INJECTION INTRAVENOUS at 05:56

## 2024-03-02 RX ADMIN — KETOROLAC TROMETHAMINE 30 MG: 30 INJECTION, SOLUTION INTRAMUSCULAR; INTRAVENOUS at 00:15

## 2024-03-02 RX ADMIN — ENOXAPARIN SODIUM 30 MG: 100 INJECTION SUBCUTANEOUS at 10:07

## 2024-03-02 RX ADMIN — ACETAMINOPHEN 650 MG: 325 TABLET ORAL at 00:15

## 2024-03-02 RX ADMIN — KETOROLAC TROMETHAMINE 30 MG: 30 INJECTION, SOLUTION INTRAMUSCULAR; INTRAVENOUS at 05:53

## 2024-03-02 RX ADMIN — ACETAMINOPHEN 650 MG: 325 TABLET ORAL at 05:53

## 2024-03-02 RX ADMIN — ACETAMINOPHEN 650 MG: 325 TABLET ORAL at 12:06

## 2024-03-02 RX ADMIN — OXYCODONE 5 MG: 5 TABLET ORAL at 08:11

## 2024-03-02 RX ADMIN — SODIUM CHLORIDE, PRESERVATIVE FREE 10 ML: 5 INJECTION INTRAVENOUS at 10:07

## 2024-03-02 RX ADMIN — OXYCODONE 5 MG: 5 TABLET ORAL at 12:06

## 2024-03-02 ASSESSMENT — PAIN DESCRIPTION - DESCRIPTORS
DESCRIPTORS: DISCOMFORT
DESCRIPTORS: DISCOMFORT;SHARP

## 2024-03-02 ASSESSMENT — PAIN DESCRIPTION - PAIN TYPE: TYPE: ACUTE PAIN;SURGICAL PAIN

## 2024-03-02 ASSESSMENT — PAIN DESCRIPTION - LOCATION
LOCATION: ABDOMEN

## 2024-03-02 ASSESSMENT — PAIN SCALES - GENERAL
PAINLEVEL_OUTOF10: 8
PAINLEVEL_OUTOF10: 4
PAINLEVEL_OUTOF10: 5
PAINLEVEL_OUTOF10: 6
PAINLEVEL_OUTOF10: 4

## 2024-03-02 ASSESSMENT — PAIN - FUNCTIONAL ASSESSMENT
PAIN_FUNCTIONAL_ASSESSMENT: PREVENTS OR INTERFERES SOME ACTIVE ACTIVITIES AND ADLS
PAIN_FUNCTIONAL_ASSESSMENT: PREVENTS OR INTERFERES SOME ACTIVE ACTIVITIES AND ADLS

## 2024-03-02 ASSESSMENT — PAIN DESCRIPTION - ONSET: ONSET: ON-GOING

## 2024-03-02 ASSESSMENT — PAIN DESCRIPTION - ORIENTATION
ORIENTATION: RIGHT
ORIENTATION: RIGHT

## 2024-03-02 NOTE — PROGRESS NOTES
General Surgery Progress Note    Subjective:  Doing okay. Had resolution of pain last night but had some return this morning after eating. Started in left flank and moved to epigastrium around to right flank as well as some left lower quadrant    Objective:  Vital signs reviewed  Gen: awake, alert, in NAD  HEENT: NCAT  Resp: non-labored breathing  CV: regular rate, distal pulses intact  Abd: soft, appropriately tender to palpation, incisions c/d/I with glue,  Skin: warm, well perfused    Assessment/Plan:  Acute on chronic abdominal pain also post-op day 2 from robotic cholecystectomy    Plan for discharge today and outpatient follow up  Will likely refer to GI pain specialist in [place] as outpatient    Trav Zaidi DO  8:35 AM

## 2024-03-02 NOTE — PLAN OF CARE
Problem: Discharge Planning  Goal: Discharge to home or other facility with appropriate resources  3/2/2024 0000 by Debora Gimenez, RN  Outcome: Progressing  3/1/2024 2033 by Debora Gimenez RN  Outcome: Progressing  Flowsheets (Taken 3/1/2024 1211 by Stephanie Dumont, RN)  Discharge to home or other facility with appropriate resources: Refer to discharge planning if patient needs post-hospital services based on physician order or complex needs related to functional status, cognitive ability or social support system     Problem: Pain  Goal: Verbalizes/displays adequate comfort level or baseline comfort level  3/2/2024 0000 by Debora Gimenez RN  Outcome: Progressing  3/1/2024 2033 by Debora Gimenez RN  Outcome: Progressing  Flowsheets (Taken 3/1/2024 2033)  Verbalizes/displays adequate comfort level or baseline comfort level:   Encourage patient to monitor pain and request assistance   Administer analgesics based on type and severity of pain and evaluate response   Assess pain using appropriate pain scale   Implement non-pharmacological measures as appropriate and evaluate response     Problem: Safety - Adult  Goal: Free from fall injury  3/2/2024 0000 by Debora Gimenez, RN  Outcome: Progressing  3/1/2024 2033 by Debora Gimenez, RN  Outcome: Progressing

## 2024-03-02 NOTE — PLAN OF CARE
Problem: Discharge Planning  Goal: Discharge to home or other facility with appropriate resources  Outcome: Progressing  Flowsheets (Taken 3/1/2024 1211 by Stephanie Dumont, RN)  Discharge to home or other facility with appropriate resources: Refer to discharge planning if patient needs post-hospital services based on physician order or complex needs related to functional status, cognitive ability or social support system     Problem: Pain  Goal: Verbalizes/displays adequate comfort level or baseline comfort level  Outcome: Progressing  Flowsheets (Taken 3/1/2024 2033)  Verbalizes/displays adequate comfort level or baseline comfort level:   Encourage patient to monitor pain and request assistance   Administer analgesics based on type and severity of pain and evaluate response   Assess pain using appropriate pain scale   Implement non-pharmacological measures as appropriate and evaluate response     Problem: Safety - Adult  Goal: Free from fall injury  Outcome: Progressing

## 2024-03-02 NOTE — PROGRESS NOTES
4 Eyes Skin Assessment     NAME:  Samira Nunez  YOB: 1995  MEDICAL RECORD NUMBER:  [id number]    The patient is being assessed for  Admission    I agree that at least one RN has performed a thorough Head to Toe Skin Assessment on the patient. ALL assessment sites listed below have been assessed.      Areas assessed by both nurses:    Head, Face, Ears, Shoulders, Back, Chest, Arms, Elbows, Hands, Sacrum. Buttock, Coccyx, Ischium, and Legs. Feet and Heels        Does the Patient have a Wound? Yes wound(s) were present on assessment. LDA wound assessment was Initiated and completed by RN   4 abdominal lap sites and 1 surgical puncture site.   Iam Prevention initiated by RN: No  Wound Care Orders initiated by RN: Yes    Pressure Injury (Stage 3,4, Unstageable, DTI, NWPT, and Complex wounds) if present, place Wound referral order by RN under : No    New Ostomies, if present place, Ostomy referral order under : No     Nurse 1 eSignature: Electronically signed by Abi Bryant RN on 3/1/24 at 7:10 PM EST    **SHARE this note so that the co-signing nurse can place an eSignature**    Nurse 2 eSignature: Electronically signed by Fernanda Moura RN on 3/1/24 at 7:41 PM EST

## 2024-03-03 NOTE — DISCHARGE SUMMARY
Physician Discharge Summary     Patient ID:  Samira Nunez  [id number]  28 y.o.  1995    Admit date: 3/1/2024    Discharge date and time: 3/2/2024  2:08 PM     Admitting Physician: Trav Zaidi DO     Discharge Physician: Trav Zaidi DO    Admission Diagnoses: Epigastric pain [R10.13]  S/P laparoscopic cholecystectomy [Z90.49]  S/P cholecystectomy [Z90.49]    Discharge Diagnoses: Acute on chronic abdominal pain    Admission Condition: stable    Discharged Condition: stable    Indication for Admission: Abdominal pain s/p lap justin previous day    Hospital Course: Patient presented to ER with abdominal pain the day after cholecystectomy. CT scan showed expected postoperative changes in the gallbladder fossa. Her LFTs were slightly elevated which is expected day after cholecystectomy. She was mostly complaining of the chronic pain she has been experiencing for months. She was admitted overnight for observation. The following day, LFTs trended down toward normal and she was feeling well enough for discharge.    Consults: none      Disposition: home    Patient Instructions:        Medication List        CONTINUE taking these medications      acetaminophen 325 MG tablet  Commonly known as: TYLENOL     amphetamine-dextroamphetamine 20 MG tablet  Commonly known as: ADDERALL     Claritin 10 MG tablet  Generic drug: loratadine     diclofenac sodium 1 % Gel  Commonly known as: VOLTAREN     dicyclomine 10 MG capsule  Commonly known as: BENTYL     drospirenone-ethinyl estradiol 3-0.02 MG per tablet  Commonly known as: KELLIE     fluticasone 50 MCG/ACT nasal spray  Commonly known as: FLONASE     methocarbamol 750 MG tablet  Commonly known as: ROBAXIN  Take 1 tablet by mouth 4 times daily as needed (Muscle Spasms)     montelukast 10 MG tablet  Commonly known as: SINGULAIR  Take 1 tablet by mouth nightly     ondansetron 4 MG tablet  Commonly known as: Zofran  Take 1 tablet by mouth every 8 hours as needed for Nausea

## 2024-03-04 LAB — SURGICAL PATHOLOGY REPORT: NORMAL

## 2024-03-23 PROBLEM — E86.0 DEHYDRATION: Status: RESOLVED | Noted: 2024-02-22 | Resolved: 2024-03-23

## 2024-04-02 ENCOUNTER — PATIENT MESSAGE (OUTPATIENT)
Dept: FAMILY MEDICINE CLINIC | Age: 29
End: 2024-04-02

## 2024-04-02 DIAGNOSIS — F90.9 ATTENTION DEFICIT HYPERACTIVITY DISORDER (ADHD), UNSPECIFIED ADHD TYPE: Primary | ICD-10-CM

## 2024-04-02 RX ORDER — PAROXETINE HYDROCHLORIDE 20 MG/1
20 TABLET, FILM COATED ORAL NIGHTLY
Qty: 30 TABLET | Refills: 0 | Status: SHIPPED | OUTPATIENT
Start: 2024-04-02

## 2024-04-02 RX ORDER — DEXTROAMPHETAMINE SACCHARATE, AMPHETAMINE ASPARTATE, DEXTROAMPHETAMINE SULFATE AND AMPHETAMINE SULFATE 5; 5; 5; 5 MG/1; MG/1; MG/1; MG/1
20 TABLET ORAL 2 TIMES DAILY
Qty: 60 TABLET | Refills: 0 | Status: SHIPPED | OUTPATIENT
Start: 2024-04-02 | End: 2024-05-02

## 2024-04-02 NOTE — TELEPHONE ENCOUNTER
From: Samira Nunez  To: Dr. Janette Escalante  Sent: 4/2/2024 3:44 PM EDT  Subject: Meds    Are you able to refill my Paxil and adderal please. I have insurance now and I have an appointment later this month with a psychiatrist?

## 2024-04-04 ENCOUNTER — OFFICE VISIT (OUTPATIENT)
Dept: FAMILY MEDICINE CLINIC | Age: 29
End: 2024-04-04
Payer: COMMERCIAL

## 2024-04-04 VITALS
HEART RATE: 120 BPM | DIASTOLIC BLOOD PRESSURE: 68 MMHG | TEMPERATURE: 97.6 F | HEIGHT: 65 IN | SYSTOLIC BLOOD PRESSURE: 126 MMHG | BODY MASS INDEX: 42.15 KG/M2 | RESPIRATION RATE: 17 BRPM | OXYGEN SATURATION: 98 % | WEIGHT: 253 LBS

## 2024-04-04 DIAGNOSIS — F41.9 ANXIETY AND DEPRESSION: ICD-10-CM

## 2024-04-04 DIAGNOSIS — K21.9 GASTROESOPHAGEAL REFLUX DISEASE, UNSPECIFIED WHETHER ESOPHAGITIS PRESENT: ICD-10-CM

## 2024-04-04 DIAGNOSIS — J45.909 UNCOMPLICATED ASTHMA, UNSPECIFIED ASTHMA SEVERITY, UNSPECIFIED WHETHER PERSISTENT: Primary | ICD-10-CM

## 2024-04-04 DIAGNOSIS — F90.9 ATTENTION DEFICIT HYPERACTIVITY DISORDER (ADHD), UNSPECIFIED ADHD TYPE: ICD-10-CM

## 2024-04-04 DIAGNOSIS — F32.A ANXIETY AND DEPRESSION: ICD-10-CM

## 2024-04-04 LAB — CORTISOL: 19.9 UG/DL (ref 2.7–18.4)

## 2024-04-04 PROCEDURE — 99214 OFFICE O/P EST MOD 30 MIN: CPT | Performed by: STUDENT IN AN ORGANIZED HEALTH CARE EDUCATION/TRAINING PROGRAM

## 2024-04-04 RX ORDER — OMEPRAZOLE 40 MG/1
40 CAPSULE, DELAYED RELEASE ORAL
Qty: 90 CAPSULE | Refills: 1 | Status: SHIPPED | OUTPATIENT
Start: 2024-04-04

## 2024-04-04 RX ORDER — DEXTROAMPHETAMINE SACCHARATE, AMPHETAMINE ASPARTATE, DEXTROAMPHETAMINE SULFATE AND AMPHETAMINE SULFATE 5; 5; 5; 5 MG/1; MG/1; MG/1; MG/1
20 TABLET ORAL 2 TIMES DAILY
Qty: 60 TABLET | Refills: 0 | Status: CANCELLED | OUTPATIENT
Start: 2024-04-04 | End: 2024-05-04

## 2024-04-04 RX ORDER — PAROXETINE HYDROCHLORIDE 20 MG/1
20 TABLET, FILM COATED ORAL NIGHTLY
Qty: 30 TABLET | Refills: 0 | Status: CANCELLED | OUTPATIENT
Start: 2024-04-04

## 2024-04-04 RX ORDER — ALBUTEROL SULFATE 90 UG/1
2 AEROSOL, METERED RESPIRATORY (INHALATION) EVERY 4 HOURS PRN
Qty: 18 G | Refills: 3 | Status: SHIPPED | OUTPATIENT
Start: 2024-04-04

## 2024-04-04 ASSESSMENT — ENCOUNTER SYMPTOMS
RHINORRHEA: 0
SHORTNESS OF BREATH: 0
DIARRHEA: 0
SORE THROAT: 0
VOMITING: 0
NAUSEA: 0
SINUS PRESSURE: 0
ABDOMINAL PAIN: 1
COUGH: 0
CONSTIPATION: 0
EYE PAIN: 0
EYE REDNESS: 0

## 2024-04-04 NOTE — PROGRESS NOTES
Historical, MD   spironolactone (ALDACTONE) 25 MG tablet Take 1 tablet by mouth 2 times daily Yes Provider, MD Daria   methocarbamol (ROBAXIN) 750 MG tablet Take 1 tablet by mouth 4 times daily as needed (Muscle Spasms) Yes Janette Escalante DO        Allergies   Allergen Reactions    Bee Venom Swelling     UNKNOWN AREA OF SWELLING    Fentanyl Other (See Comments)     PT STATES \"IT MAKES HER PAIN WORSE\"    Imitrex [Sumatriptan] Other (See Comments)     PT STATES \"FELT LIKE HEAD WAS ON FIRE\"    Seasonal Itching and Cough     SNEEZING, ITCHY WATERY EYES, CONGESTION       Past Medical History:   Diagnosis Date    Asthma     Chronic fatigue     Class 3 severe obesity due to excess calories with serious comorbidity and body mass index (BMI) of 50.0 to 59.9 in adult (HCC)     COVID-19 12/01/2023    Elevated liver transaminase level 09/17/2021    Kidney stone     Migraine without aura 09/17/2021    PCOS (polycystic ovarian syndrome)     Sleep apnea     non compliant with cpap       Past Surgical History:   Procedure Laterality Date    CHOLECYSTECTOMY, LAPAROSCOPIC N/A 2/29/2024    LAPAROSCOPIC ROBOTIC XI ASSISTED CHOLECYSTECTOMY WITH INDOCYANINE GREEN CHOLANGIOGRAM performed by Trav Zaidi DO at [facility] OR    UPPER GASTROINTESTINAL ENDOSCOPY N/A 2/2/2024    EGD ESOPHAGOGASTRODUODENOSCOPY performed by Trav Zaidi DO at [facility] ENDOSCOPY    UPPER GASTROINTESTINAL ENDOSCOPY N/A 2/28/2024    EGD ESOPHAGOGASTRODUODENOSCOPY performed by Trav Zaidi DO at [facility] ENDOSCOPY       Social History     Socioeconomic History    Marital status: Single     Spouse name: Not on file    Number of children: Not on file    Years of education: Not on file    Highest education level: Not on file   Occupational History    Not on file   Tobacco Use    Smoking status: Never     Passive exposure: Past    Smokeless tobacco: Never   Vaping Use    Vaping Use: Never used   Substance and Sexual Activity    Alcohol use: Not Currently

## 2024-04-11 DIAGNOSIS — R79.89 ELEVATED CORTISOL LEVEL: Primary | ICD-10-CM

## 2024-04-22 ENCOUNTER — APPOINTMENT (OUTPATIENT)
Dept: CT IMAGING | Age: 29
End: 2024-04-22
Payer: COMMERCIAL

## 2024-04-22 ENCOUNTER — HOSPITAL ENCOUNTER (EMERGENCY)
Age: 29
Discharge: HOME | End: 2024-04-22
Attending: EMERGENCY MEDICINE
Payer: COMMERCIAL

## 2024-04-22 VITALS
SYSTOLIC BLOOD PRESSURE: 140 MMHG | DIASTOLIC BLOOD PRESSURE: 71 MMHG | TEMPERATURE: 97.7 F | WEIGHT: 247 LBS | BODY MASS INDEX: 41.1 KG/M2 | RESPIRATION RATE: 24 BRPM | HEART RATE: 82 BPM | OXYGEN SATURATION: 96 %

## 2024-04-22 DIAGNOSIS — N20.0 KIDNEY STONE: Primary | ICD-10-CM

## 2024-04-22 LAB
ALBUMIN SERPL-MCNC: 4.1 G/DL (ref 3.5–5.2)
ALP SERPL-CCNC: 73 U/L (ref 35–104)
ALT SERPL-CCNC: <5 U/L (ref 0–32)
ANION GAP SERPL CALCULATED.3IONS-SCNC: 15 MMOL/L (ref 7–16)
AST SERPL-CCNC: 10 U/L (ref 0–31)
BACTERIA URNS QL MICRO: ABNORMAL
BASOPHILS # BLD: 0.04 K/UL (ref 0–0.2)
BASOPHILS NFR BLD: 0 % (ref 0–2)
BILIRUB SERPL-MCNC: <0.2 MG/DL (ref 0–1.2)
BILIRUB UR QL STRIP: ABNORMAL
BUN SERPL-MCNC: 14 MG/DL (ref 6–20)
CALCIUM SERPL-MCNC: 9.3 MG/DL (ref 8.6–10.2)
CHLORIDE SERPL-SCNC: 103 MMOL/L (ref 98–107)
CLARITY UR: ABNORMAL
CO2 SERPL-SCNC: 21 MMOL/L (ref 22–29)
COLOR UR: ABNORMAL
CREAT SERPL-MCNC: 1 MG/DL (ref 0.5–1)
EKG ATRIAL RATE: 83 BPM
EKG P AXIS: 52 DEGREES
EKG P-R INTERVAL: 120 MS
EKG Q-T INTERVAL: 378 MS
EKG QRS DURATION: 72 MS
EKG QTC CALCULATION (BAZETT): 444 MS
EKG R AXIS: 49 DEGREES
EKG T AXIS: 35 DEGREES
EKG VENTRICULAR RATE: 83 BPM
EOSINOPHIL # BLD: 0.05 K/UL (ref 0.05–0.5)
EOSINOPHILS RELATIVE PERCENT: 0 % (ref 0–6)
EPI CELLS #/AREA URNS HPF: ABNORMAL /HPF
ERYTHROCYTE [DISTWIDTH] IN BLOOD BY AUTOMATED COUNT: 13 % (ref 11.5–15)
GFR SERPL CREATININE-BSD FRML MDRD: 81 ML/MIN/1.73M2
GLUCOSE SERPL-MCNC: 139 MG/DL (ref 74–99)
GLUCOSE UR STRIP-MCNC: NEGATIVE MG/DL
HCG UR QL: NEGATIVE
HCT VFR BLD AUTO: 40.1 % (ref 34–48)
HGB BLD-MCNC: 13.4 G/DL (ref 11.5–15.5)
HGB UR QL STRIP.AUTO: ABNORMAL
IMM GRANULOCYTES # BLD AUTO: 0.04 K/UL (ref 0–0.58)
IMM GRANULOCYTES NFR BLD: 0 % (ref 0–5)
KETONES UR STRIP-MCNC: ABNORMAL MG/DL
LACTATE BLDV-SCNC: 2.7 MMOL/L (ref 0.5–2.2)
LACTATE BLDV-SCNC: 3 MMOL/L (ref 0.5–2.2)
LEUKOCYTE ESTERASE UR QL STRIP: NEGATIVE
LIPASE SERPL-CCNC: 20 U/L (ref 13–60)
LYMPHOCYTES NFR BLD: 2.19 K/UL (ref 1.5–4)
LYMPHOCYTES RELATIVE PERCENT: 19 % (ref 20–42)
MCH RBC QN AUTO: 28.2 PG (ref 26–35)
MCHC RBC AUTO-ENTMCNC: 33.4 G/DL (ref 32–34.5)
MCV RBC AUTO: 84.2 FL (ref 80–99.9)
MONOCYTES NFR BLD: 0.62 K/UL (ref 0.1–0.95)
MONOCYTES NFR BLD: 5 % (ref 2–12)
NEUTROPHILS NFR BLD: 74 % (ref 43–80)
NEUTS SEG NFR BLD: 8.45 K/UL (ref 1.8–7.3)
NITRITE UR QL STRIP: NEGATIVE
PH UR STRIP: 6 [PH] (ref 5–9)
PLATELET # BLD AUTO: 413 K/UL (ref 130–450)
PMV BLD AUTO: 9.9 FL (ref 7–12)
POTASSIUM SERPL-SCNC: 4.5 MMOL/L (ref 3.5–5)
PROT SERPL-MCNC: 7 G/DL (ref 6.4–8.3)
PROT UR STRIP-MCNC: 30 MG/DL
RBC # BLD AUTO: 4.76 M/UL (ref 3.5–5.5)
RBC #/AREA URNS HPF: ABNORMAL /HPF
SODIUM SERPL-SCNC: 139 MMOL/L (ref 132–146)
SP GR UR STRIP: >1.03 (ref 1–1.03)
UROBILINOGEN UR STRIP-ACNC: 0.2 EU/DL (ref 0–1)
WBC #/AREA URNS HPF: ABNORMAL /HPF
WBC OTHER # BLD: 11.4 K/UL (ref 4.5–11.5)

## 2024-04-22 PROCEDURE — 2500000003 HC RX 250 WO HCPCS: Performed by: EMERGENCY MEDICINE

## 2024-04-22 PROCEDURE — 80053 COMPREHEN METABOLIC PANEL: CPT

## 2024-04-22 PROCEDURE — 83605 ASSAY OF LACTIC ACID: CPT

## 2024-04-22 PROCEDURE — 93005 ELECTROCARDIOGRAM TRACING: CPT | Performed by: EMERGENCY MEDICINE

## 2024-04-22 PROCEDURE — 83690 ASSAY OF LIPASE: CPT

## 2024-04-22 PROCEDURE — 85025 COMPLETE CBC W/AUTO DIFF WBC: CPT

## 2024-04-22 PROCEDURE — 99284 EMERGENCY DEPT VISIT MOD MDM: CPT

## 2024-04-22 PROCEDURE — 96374 THER/PROPH/DIAG INJ IV PUSH: CPT

## 2024-04-22 PROCEDURE — 84703 CHORIONIC GONADOTROPIN ASSAY: CPT

## 2024-04-22 PROCEDURE — 81001 URINALYSIS AUTO W/SCOPE: CPT

## 2024-04-22 PROCEDURE — 2580000003 HC RX 258: Performed by: EMERGENCY MEDICINE

## 2024-04-22 PROCEDURE — 6360000002 HC RX W HCPCS: Performed by: EMERGENCY MEDICINE

## 2024-04-22 PROCEDURE — 74176 CT ABD & PELVIS W/O CONTRAST: CPT

## 2024-04-22 PROCEDURE — 96375 TX/PRO/DX INJ NEW DRUG ADDON: CPT

## 2024-04-22 PROCEDURE — 93010 ELECTROCARDIOGRAM REPORT: CPT | Performed by: INTERNAL MEDICINE

## 2024-04-22 PROCEDURE — 6370000000 HC RX 637 (ALT 250 FOR IP): Performed by: EMERGENCY MEDICINE

## 2024-04-22 PROCEDURE — 96361 HYDRATE IV INFUSION ADD-ON: CPT

## 2024-04-22 RX ORDER — 0.9 % SODIUM CHLORIDE 0.9 %
1000 INTRAVENOUS SOLUTION INTRAVENOUS ONCE
Status: COMPLETED | OUTPATIENT
Start: 2024-04-22 | End: 2024-04-22

## 2024-04-22 RX ORDER — OXYCODONE HYDROCHLORIDE AND ACETAMINOPHEN 5; 325 MG/1; MG/1
1 TABLET ORAL EVERY 6 HOURS PRN
Qty: 12 TABLET | Refills: 0 | Status: SHIPPED | OUTPATIENT
Start: 2024-04-22 | End: 2024-04-25

## 2024-04-22 RX ORDER — ONDANSETRON 2 MG/ML
4 INJECTION INTRAMUSCULAR; INTRAVENOUS ONCE
Status: COMPLETED | OUTPATIENT
Start: 2024-04-22 | End: 2024-04-22

## 2024-04-22 RX ORDER — ONDANSETRON 4 MG/1
4 TABLET, ORALLY DISINTEGRATING ORAL 3 TIMES DAILY PRN
Qty: 21 TABLET | Refills: 0 | Status: SHIPPED | OUTPATIENT
Start: 2024-04-22

## 2024-04-22 RX ORDER — TAMSULOSIN HYDROCHLORIDE 0.4 MG/1
0.4 CAPSULE ORAL ONCE
Status: COMPLETED | OUTPATIENT
Start: 2024-04-22 | End: 2024-04-22

## 2024-04-22 RX ORDER — KETOROLAC TROMETHAMINE 30 MG/ML
30 INJECTION, SOLUTION INTRAMUSCULAR; INTRAVENOUS ONCE
Status: COMPLETED | OUTPATIENT
Start: 2024-04-22 | End: 2024-04-22

## 2024-04-22 RX ORDER — HYDROMORPHONE HYDROCHLORIDE 1 MG/ML
1 INJECTION, SOLUTION INTRAMUSCULAR; INTRAVENOUS; SUBCUTANEOUS ONCE
Status: COMPLETED | OUTPATIENT
Start: 2024-04-22 | End: 2024-04-22

## 2024-04-22 RX ORDER — KETOROLAC TROMETHAMINE 10 MG/1
10 TABLET, FILM COATED ORAL EVERY 6 HOURS PRN
Qty: 20 TABLET | Refills: 0 | Status: SHIPPED | OUTPATIENT
Start: 2024-04-22

## 2024-04-22 RX ORDER — TAMSULOSIN HYDROCHLORIDE 0.4 MG/1
0.4 CAPSULE ORAL DAILY
Qty: 15 CAPSULE | Refills: 0 | Status: SHIPPED | OUTPATIENT
Start: 2024-04-22 | End: 2024-05-07

## 2024-04-22 RX ORDER — MORPHINE SULFATE 4 MG/ML
4 INJECTION, SOLUTION INTRAMUSCULAR; INTRAVENOUS ONCE
Status: COMPLETED | OUTPATIENT
Start: 2024-04-22 | End: 2024-04-22

## 2024-04-22 RX ADMIN — HYDROMORPHONE HYDROCHLORIDE 1 MG: 1 INJECTION, SOLUTION INTRAMUSCULAR; INTRAVENOUS; SUBCUTANEOUS at 09:31

## 2024-04-22 RX ADMIN — TAMSULOSIN HYDROCHLORIDE 0.4 MG: 0.4 CAPSULE ORAL at 11:04

## 2024-04-22 RX ADMIN — KETOROLAC TROMETHAMINE 30 MG: 30 INJECTION, SOLUTION INTRAMUSCULAR at 08:36

## 2024-04-22 RX ADMIN — MORPHINE SULFATE 4 MG: 4 INJECTION, SOLUTION INTRAMUSCULAR; INTRAVENOUS at 08:37

## 2024-04-22 RX ADMIN — SODIUM CHLORIDE 1000 ML: 9 INJECTION, SOLUTION INTRAVENOUS at 08:35

## 2024-04-22 RX ADMIN — ONDANSETRON 4 MG: 2 INJECTION INTRAMUSCULAR; INTRAVENOUS at 08:36

## 2024-04-22 RX ADMIN — SODIUM CHLORIDE 1000 ML: 9 INJECTION, SOLUTION INTRAVENOUS at 09:32

## 2024-04-22 ASSESSMENT — ENCOUNTER SYMPTOMS
BACK PAIN: 0
COUGH: 0
ABDOMINAL PAIN: 0
SHORTNESS OF BREATH: 0

## 2024-04-22 ASSESSMENT — PAIN SCALES - GENERAL
PAINLEVEL_OUTOF10: 4
PAINLEVEL_OUTOF10: 10

## 2024-04-22 ASSESSMENT — PAIN DESCRIPTION - ORIENTATION
ORIENTATION: RIGHT
ORIENTATION: LEFT
ORIENTATION: LEFT

## 2024-04-22 ASSESSMENT — PAIN DESCRIPTION - LOCATION
LOCATION: FLANK

## 2024-04-22 ASSESSMENT — PAIN DESCRIPTION - DESCRIPTORS
DESCRIPTORS: CRUSHING;SHARP;SHOOTING
DESCRIPTORS: ACHING;DISCOMFORT;DULL

## 2024-04-22 NOTE — ED PROVIDER NOTES
Eye infections ou
of an acute process requiring hospitalization or inpatient management.  They have remained hemodynamically stable throughout their entire ED visit and are stable for discharge with outpatient follow-up.     The plan has been discussed in detail and they are aware of the specific conditions for emergent return, as well as the importance of follow-up.      Discharge Medication List as of 4/22/2024 11:43 AM        START taking these medications    Details   oxyCODONE-acetaminophen (PERCOCET) 5-325 MG per tablet Take 1 tablet by mouth every 6 hours as needed for Pain for up to 3 days. Intended supply: 3 days. Take lowest dose possible to manage pain Max Daily Amount: 4 tablets, Disp-12 tablet, R-0Normal      ketorolac (TORADOL) 10 MG tablet Take 1 tablet by mouth every 6 hours as needed for Pain, Disp-20 tablet, R-0Normal      tamsulosin (FLOMAX) 0.4 MG capsule Take 1 capsule by mouth daily for 15 days, Disp-15 capsule, R-0Normal      ondansetron (ZOFRAN-ODT) 4 MG disintegrating tablet Take 1 tablet by mouth 3 times daily as needed for Nausea or Vomiting, Disp-21 tablet, R-0Normal             Diagnosis:  1. Kidney stone        Disposition:  Patient's disposition: Discharge to home  Patient's condition is stable.        Jamar Caro DO  04/22/24 [id number]

## 2024-05-16 ENCOUNTER — TELEPHONE (OUTPATIENT)
Dept: ONCOLOGY | Age: 29
End: 2024-05-16

## 2024-05-17 ENCOUNTER — HOSPITAL ENCOUNTER (OUTPATIENT)
Dept: INFUSION THERAPY | Age: 29
End: 2024-05-17

## 2024-05-17 ENCOUNTER — PATIENT MESSAGE (OUTPATIENT)
Dept: FAMILY MEDICINE CLINIC | Age: 29
End: 2024-05-17

## 2024-05-17 DIAGNOSIS — F90.9 ATTENTION DEFICIT HYPERACTIVITY DISORDER (ADHD), UNSPECIFIED ADHD TYPE: ICD-10-CM

## 2024-05-17 RX ORDER — DEXTROAMPHETAMINE SACCHARATE, AMPHETAMINE ASPARTATE, DEXTROAMPHETAMINE SULFATE AND AMPHETAMINE SULFATE 5; 5; 5; 5 MG/1; MG/1; MG/1; MG/1
20 TABLET ORAL 2 TIMES DAILY
Qty: 60 TABLET | Refills: 0 | Status: SHIPPED | OUTPATIENT
Start: 2024-05-17 | End: 2024-06-16

## 2024-05-17 RX ORDER — PAROXETINE HYDROCHLORIDE 20 MG/1
20 TABLET, FILM COATED ORAL NIGHTLY
Qty: 30 TABLET | Refills: 0 | Status: SHIPPED | OUTPATIENT
Start: 2024-05-17

## 2024-05-17 NOTE — TELEPHONE ENCOUNTER
From: Samira Nunez  To: Dr. Janette Escalante  Sent: 5/17/2024 7:44 AM EDT  Subject: Meds    Hey  Can I get a refill on the Paxil and adderal. I got a new job so there may be a change in insurance and the doctor I found never got back to me.

## 2024-05-24 NOTE — ED NOTES
Internal Medicine Podiatry Pt asking for pain medication.  Dr Prince Elizalde notified     [facility]  11/14/23 0060 Internal Medicine Internal Medicine Internal Medicine Rehab Medicine Podiatry Podiatry Rehab Medicine Internal Medicine Internal Medicine

## 2024-06-07 ENCOUNTER — PATIENT MESSAGE (OUTPATIENT)
Dept: FAMILY MEDICINE CLINIC | Age: 29
End: 2024-06-07

## 2024-06-07 RX ORDER — DROSPIRENONE AND ETHINYL ESTRADIOL 0.02-3(28)
1 KIT ORAL NIGHTLY
Qty: 1 PACKET | Refills: 3 | Status: SHIPPED | OUTPATIENT
Start: 2024-06-07

## 2024-06-07 NOTE — TELEPHONE ENCOUNTER
From: Samira Nunez  To: Dr. Janette Escalante  Sent: 6/7/2024 9:03 AM EDT  Subject: Meds    Hi there,  Are you able to put through a refill for my Carla please

## 2024-06-21 ENCOUNTER — PATIENT MESSAGE (OUTPATIENT)
Dept: FAMILY MEDICINE CLINIC | Age: 29
End: 2024-06-21

## 2024-06-21 NOTE — TELEPHONE ENCOUNTER
From: Samira Nunez  To: Dr. Janette Escalante  Sent: 6/21/2024 7:58 AM EDT  Subject: Cancel     Under staffed at work. I can not make the appointment. I apologize

## 2024-06-30 ENCOUNTER — PATIENT MESSAGE (OUTPATIENT)
Dept: FAMILY MEDICINE CLINIC | Age: 29
End: 2024-06-30

## 2024-06-30 DIAGNOSIS — F90.9 ATTENTION DEFICIT HYPERACTIVITY DISORDER (ADHD), UNSPECIFIED ADHD TYPE: ICD-10-CM

## 2024-07-01 NOTE — TELEPHONE ENCOUNTER
From: Samira Nunez  To: Dr. Janette Escalante  Sent: 6/30/2024 10:24 AM EDT  Subject: Meds appt    Hi there,  I just wanted to fill you in. I had an appointment for phycology on the 15th they moved it to the 22nd. So I will need a refill on my meds and I will be scheduling a virtual with you again. I apologize for missing the other appointment the new job has been hectic

## 2024-07-02 RX ORDER — PAROXETINE HYDROCHLORIDE 20 MG/1
20 TABLET, FILM COATED ORAL NIGHTLY
Qty: 30 TABLET | Refills: 0 | Status: SHIPPED | OUTPATIENT
Start: 2024-07-02

## 2024-07-02 RX ORDER — DEXTROAMPHETAMINE SACCHARATE, AMPHETAMINE ASPARTATE, DEXTROAMPHETAMINE SULFATE AND AMPHETAMINE SULFATE 5; 5; 5; 5 MG/1; MG/1; MG/1; MG/1
20 TABLET ORAL 2 TIMES DAILY
Qty: 60 TABLET | Refills: 0 | Status: SHIPPED | OUTPATIENT
Start: 2024-07-02 | End: 2024-08-01

## 2024-08-03 ENCOUNTER — PATIENT MESSAGE (OUTPATIENT)
Dept: FAMILY MEDICINE CLINIC | Age: 29
End: 2024-08-03

## 2024-08-03 DIAGNOSIS — F90.9 ATTENTION DEFICIT HYPERACTIVITY DISORDER (ADHD), UNSPECIFIED ADHD TYPE: ICD-10-CM

## 2024-08-05 RX ORDER — DEXTROAMPHETAMINE SACCHARATE, AMPHETAMINE ASPARTATE, DEXTROAMPHETAMINE SULFATE AND AMPHETAMINE SULFATE 5; 5; 5; 5 MG/1; MG/1; MG/1; MG/1
20 TABLET ORAL 2 TIMES DAILY
Qty: 60 TABLET | Refills: 0 | Status: SHIPPED | OUTPATIENT
Start: 2024-08-05 | End: 2024-09-04

## 2024-08-05 NOTE — TELEPHONE ENCOUNTER
From: Samira Nunez  To: Dr. Janette Escalante  Sent: 8/3/2024 10:11 AM EDT  Subject: Meds    Hi there,  I did the intake appointment and the official psych appointment is on the 14th. I don’t know if you sent the adderal or not there was also a change in pharmacy since [facility] is closing. I believe it’s [facility] in [place] now. Can you refill that please

## 2024-08-14 ENCOUNTER — TELEPHONE (OUTPATIENT)
Dept: FAMILY MEDICINE CLINIC | Age: 29
End: 2024-08-14

## 2024-08-14 RX ORDER — PAROXETINE HYDROCHLORIDE 20 MG/1
20 TABLET, FILM COATED ORAL NIGHTLY
Qty: 90 TABLET | Refills: 1 | Status: SHIPPED | OUTPATIENT
Start: 2024-08-14

## 2024-08-14 NOTE — TELEPHONE ENCOUNTER
Spoke with pt and she stated her sister grabiel is the one seeing inocencio and who would need to be started on medication. Message in her chart. Pt will have notes faxed over. Pt also stated she would like detailed messages on voicemail due to working and does not wait to sit through all the prompts when calling back.

## 2024-08-14 NOTE — TELEPHONE ENCOUNTER
Melani called in to state that pt was seen by Connie yesterday but they did not start her on any medication. She is wondering if you would be willing to start her on something until she sees the doctor there. She is not sure when they will see the doctor

## 2024-09-04 ENCOUNTER — TELEPHONE (OUTPATIENT)
Dept: FAMILY MEDICINE CLINIC | Age: 29
End: 2024-09-04

## 2024-09-04 DIAGNOSIS — U07.1 COVID-19: Primary | ICD-10-CM

## 2024-09-04 DIAGNOSIS — U07.1 COVID-19: ICD-10-CM

## 2024-10-03 ENCOUNTER — TELEMEDICINE (OUTPATIENT)
Dept: FAMILY MEDICINE CLINIC | Age: 29
End: 2024-10-03
Payer: COMMERCIAL

## 2024-10-03 DIAGNOSIS — G43.009 MIGRAINE WITHOUT AURA AND WITHOUT STATUS MIGRAINOSUS, NOT INTRACTABLE: ICD-10-CM

## 2024-10-03 DIAGNOSIS — F32.A ANXIETY AND DEPRESSION: Primary | ICD-10-CM

## 2024-10-03 DIAGNOSIS — F41.9 ANXIETY AND DEPRESSION: ICD-10-CM

## 2024-10-03 DIAGNOSIS — F32.A ANXIETY AND DEPRESSION: ICD-10-CM

## 2024-10-03 DIAGNOSIS — G47.00 INSOMNIA, UNSPECIFIED TYPE: ICD-10-CM

## 2024-10-03 DIAGNOSIS — F41.9 ANXIETY AND DEPRESSION: Primary | ICD-10-CM

## 2024-10-03 PROCEDURE — 99214 OFFICE O/P EST MOD 30 MIN: CPT | Performed by: STUDENT IN AN ORGANIZED HEALTH CARE EDUCATION/TRAINING PROGRAM

## 2024-10-03 RX ORDER — DOXEPIN HYDROCHLORIDE 10 MG/1
10 CAPSULE ORAL NIGHTLY
Qty: 90 CAPSULE | Refills: 0 | Status: SHIPPED | OUTPATIENT
Start: 2024-10-03

## 2024-10-03 RX ORDER — RIZATRIPTAN BENZOATE 10 MG/1
10 TABLET ORAL
Qty: 30 TABLET | Refills: 3 | Status: SHIPPED
Start: 2024-10-03 | End: 2024-10-03 | Stop reason: SDUPTHER

## 2024-10-03 RX ORDER — RIZATRIPTAN BENZOATE 10 MG/1
10 TABLET ORAL
Qty: 30 TABLET | Refills: 3 | Status: SHIPPED | OUTPATIENT
Start: 2024-10-03 | End: 2024-10-03

## 2024-10-03 RX ORDER — DOXEPIN HYDROCHLORIDE 10 MG/1
10 CAPSULE ORAL NIGHTLY
Qty: 90 CAPSULE | Refills: 0 | Status: SHIPPED
Start: 2024-10-03 | End: 2024-10-03 | Stop reason: SDUPTHER

## 2024-10-03 ASSESSMENT — ENCOUNTER SYMPTOMS
ABDOMINAL PAIN: 0
SHORTNESS OF BREATH: 0
NAUSEA: 0
CONSTIPATION: 0
EYE PAIN: 0
DIARRHEA: 0
SINUS PAIN: 0
RHINORRHEA: 0
SINUS PRESSURE: 0
VOMITING: 0
SORE THROAT: 0
COUGH: 0
EYE REDNESS: 0

## 2024-10-03 NOTE — PROGRESS NOTES
Samira Nunez, was evaluated through a synchronous (real-time) audio-video encounter. The patient (or guardian if applicable) is aware that this is a billable service, which includes applicable co-pays. This Virtual Visit was conducted with patient's (and/or legal guardian's) consent. Patient identification was verified, and a caregiver was present when appropriate.   The patient was located at Home: [address] [place] [id number]  Provider was located at Facility (Appt Dept): [address] [place] [id number]  Confirm you are appropriately licensed, registered, or certified to deliver care in the [facility] where the patient is located as indicated above. If you are not or unsure, please re-schedule the visit: Yes, I confirm.     Samira Nunez (:  1995) is a Established patient, presenting virtually for evaluation of the following:      Below is the assessment and plan developed based on review of pertinent history, physical exam, labs, studies, and medications.     Assessment & Plan  Anxiety and depression   Chronic, at goal (stable), continue current treatment plan    Orders:    doxepin (SINEQUAN) 10 MG capsule; Take 1 capsule by mouth nightly    Insomnia, unspecified type   Chronic, worsening (exacerbation), changes made today: will start doxepin    Orders:    doxepin (SINEQUAN) 10 MG capsule; Take 1 capsule by mouth nightly    Migraine without aura and without status migrainosus, not intractable   Chronic, worsening (exacerbation), changes made today: increase maxalt    Orders:    rizatriptan (MAXALT) 10 MG tablet; Take 1 tablet by mouth once as needed for Migraine May repeat in 2 hours if needed    As above.   Educational materials and/or home exercises printed for patient's review and were included in patient instructions on his/her After Visit Summary and given to patient at the end of visit.       Counseled regarding above diagnosis, including possible risks and complications,  especially

## 2024-10-03 NOTE — ASSESSMENT & PLAN NOTE
Chronic, worsening (exacerbation), changes made today: increase maxalt    Orders:    rizatriptan (MAXALT) 10 MG tablet; Take 1 tablet by mouth once as needed for Migraine May repeat in 2 hours if needed    As above.   Educational materials and/or home exercises printed for patient's review and were included in patient instructions on his/her After Visit Summary and given to patient at the end of visit.       Counseled regarding above diagnosis, including possible risks and complications,  especially if left uncontrolled.     Counseled regarding the possible side effects, risks, benefits and alternatives to treatment; patient and/or guardian verbalizes understanding, agrees, feels comfortable with and wishes to proceed with above treatment plan.     Advised patient to call with any new medication issues, and read all Rx info from pharmacy to assure aware of all possible risks and side effects of medication before taking.     Patient and/or guardian verbalizes understanding and agrees with above counseling, assessment and plan.     All questions answered.

## 2024-11-17 ENCOUNTER — PATIENT MESSAGE (OUTPATIENT)
Dept: FAMILY MEDICINE CLINIC | Age: 29
End: 2024-11-17

## 2024-11-17 DIAGNOSIS — G43.009 MIGRAINE WITHOUT AURA AND WITHOUT STATUS MIGRAINOSUS, NOT INTRACTABLE: ICD-10-CM

## 2024-11-18 RX ORDER — DROSPIRENONE AND ETHINYL ESTRADIOL 0.02-3(28)
1 KIT ORAL NIGHTLY
Qty: 1 PACKET | Refills: 0 | Status: SHIPPED | OUTPATIENT
Start: 2024-11-18

## 2024-11-18 RX ORDER — RIZATRIPTAN BENZOATE 10 MG/1
10 TABLET ORAL
Qty: 30 TABLET | Refills: 0 | Status: SHIPPED | OUTPATIENT
Start: 2024-11-18 | End: 2024-11-18

## 2024-11-20 RX ORDER — SPIRONOLACTONE 25 MG/1
25 TABLET ORAL 2 TIMES DAILY
Qty: 60 TABLET | Refills: 2 | Status: SHIPPED | OUTPATIENT
Start: 2024-11-20

## 2024-11-30 ENCOUNTER — HOSPITAL ENCOUNTER (EMERGENCY)
Age: 29
Discharge: HOME | End: 2024-11-30
Attending: EMERGENCY MEDICINE

## 2024-11-30 ENCOUNTER — APPOINTMENT (OUTPATIENT)
Dept: GENERAL RADIOLOGY | Age: 29
End: 2024-11-30

## 2024-11-30 VITALS
DIASTOLIC BLOOD PRESSURE: 80 MMHG | HEART RATE: 84 BPM | RESPIRATION RATE: 16 BRPM | TEMPERATURE: 98 F | BODY MASS INDEX: 41.1 KG/M2 | OXYGEN SATURATION: 98 % | WEIGHT: 247 LBS | SYSTOLIC BLOOD PRESSURE: 153 MMHG

## 2024-11-30 DIAGNOSIS — S69.90XA FISH HOOK IN HAND: Primary | ICD-10-CM

## 2024-11-30 PROCEDURE — 12001 RPR S/N/AX/GEN/TRNK 2.5CM/<: CPT

## 2024-11-30 PROCEDURE — 6360000002 HC RX W HCPCS: Performed by: NURSE PRACTITIONER

## 2024-11-30 PROCEDURE — 73130 X-RAY EXAM OF HAND: CPT

## 2024-11-30 PROCEDURE — 90471 IMMUNIZATION ADMIN: CPT | Performed by: NURSE PRACTITIONER

## 2024-11-30 PROCEDURE — 99284 EMERGENCY DEPT VISIT MOD MDM: CPT

## 2024-11-30 PROCEDURE — 6370000000 HC RX 637 (ALT 250 FOR IP): Performed by: NURSE PRACTITIONER

## 2024-11-30 PROCEDURE — 90715 TDAP VACCINE 7 YRS/> IM: CPT | Performed by: NURSE PRACTITIONER

## 2024-11-30 RX ORDER — CIPROFLOXACIN 500 MG/1
500 TABLET, FILM COATED ORAL ONCE
Status: COMPLETED | OUTPATIENT
Start: 2024-11-30 | End: 2024-11-30

## 2024-11-30 RX ORDER — CIPROFLOXACIN 500 MG/1
500 TABLET, FILM COATED ORAL 2 TIMES DAILY
Qty: 14 TABLET | Refills: 0 | Status: SHIPPED | OUTPATIENT
Start: 2024-11-30 | End: 2024-12-07

## 2024-11-30 RX ORDER — BACITRACIN ZINC 500 [USP'U]/G
OINTMENT TOPICAL ONCE
Status: COMPLETED | OUTPATIENT
Start: 2024-11-30 | End: 2024-11-30

## 2024-11-30 RX ORDER — BACITRACIN ZINC AND POLYMYXIN B SULFATE 500; 1000 [USP'U]/G; [USP'U]/G
OINTMENT TOPICAL
Qty: 30 G | Refills: 0 | Status: SHIPPED | OUTPATIENT
Start: 2024-11-30 | End: 2024-12-07

## 2024-11-30 RX ORDER — IBUPROFEN 400 MG/1
400 TABLET, FILM COATED ORAL ONCE
Status: COMPLETED | OUTPATIENT
Start: 2024-11-30 | End: 2024-11-30

## 2024-11-30 RX ORDER — LIDOCAINE HYDROCHLORIDE AND EPINEPHRINE 10; 10 MG/ML; UG/ML
20 INJECTION, SOLUTION INFILTRATION; PERINEURAL ONCE
Status: COMPLETED | OUTPATIENT
Start: 2024-11-30 | End: 2024-11-30

## 2024-11-30 RX ORDER — IBUPROFEN 600 MG/1
600 TABLET, FILM COATED ORAL 3 TIMES DAILY PRN
Qty: 30 TABLET | Refills: 0 | Status: SHIPPED | OUTPATIENT
Start: 2024-11-30

## 2024-11-30 RX ADMIN — IBUPROFEN 400 MG: 400 TABLET, FILM COATED ORAL at 17:35

## 2024-11-30 RX ADMIN — BACITRACIN ZINC: 500 OINTMENT TOPICAL at 18:23

## 2024-11-30 RX ADMIN — CIPROFLOXACIN HYDROCHLORIDE 500 MG: 500 TABLET, FILM COATED ORAL at 18:22

## 2024-11-30 RX ADMIN — LIDOCAINE HYDROCHLORIDE,EPINEPHRINE BITARTRATE 20 ML: 10; .01 INJECTION, SOLUTION INFILTRATION; PERINEURAL at 17:35

## 2024-11-30 RX ADMIN — IBUPROFEN 400 MG: 400 TABLET, FILM COATED ORAL at 18:21

## 2024-11-30 RX ADMIN — TETANUS TOXOID, REDUCED DIPHTHERIA TOXOID AND ACELLULAR PERTUSSIS VACCINE, ADSORBED 0.5 ML: 5; 2.5; 8; 8; 2.5 SUSPENSION INTRAMUSCULAR at 18:22

## 2024-11-30 ASSESSMENT — PAIN - FUNCTIONAL ASSESSMENT: PAIN_FUNCTIONAL_ASSESSMENT: 0-10

## 2024-11-30 ASSESSMENT — PAIN DESCRIPTION - DESCRIPTORS
DESCRIPTORS: BURNING;DISCOMFORT;THROBBING
DESCRIPTORS: ACHING;THROBBING;DISCOMFORT
DESCRIPTORS: DISCOMFORT;TENDER

## 2024-11-30 ASSESSMENT — PAIN DESCRIPTION - FREQUENCY: FREQUENCY: CONTINUOUS

## 2024-11-30 ASSESSMENT — PAIN DESCRIPTION - PAIN TYPE: TYPE: ACUTE PAIN

## 2024-11-30 ASSESSMENT — PAIN SCALES - GENERAL
PAINLEVEL_OUTOF10: 5

## 2024-11-30 ASSESSMENT — PAIN DESCRIPTION - ORIENTATION
ORIENTATION: LEFT

## 2024-11-30 ASSESSMENT — PAIN DESCRIPTION - LOCATION
LOCATION: HAND

## 2024-11-30 ASSESSMENT — PAIN DESCRIPTION - ONSET: ONSET: SUDDEN

## 2024-11-30 NOTE — DISCHARGE INSTRUCTIONS
Suture removal in 7 days   Please apply ice as needed to the left hand 15 minutes off 15 minutes: For the next 3 days for pain.  Also please keep wound clean and dry with soap and water.

## 2024-12-01 NOTE — ED PROVIDER NOTES
Shared STEVEN-ED Attending Visit.  CC: No          [facility] EMERGENCY DEPARTMENT  EMERGENCY DEPARTMENT ENCOUNTER        Pt Name: Samira Nunez  MRN: [id number]  Birthdate 1995  Date of evaluation: 11/30/2024  Provider: ALICIA Carvajal - CNP  PCP: Janette Escalante DO  Note Started: 10:49 PM EST 11/30/24    CHIEF COMPLAINT       Chief Complaint   Patient presents with    Foreign Body     Hook in left hand.         HISTORY OF PRESENT ILLNESS: 1 or more Elements     History from : Patient and Significant Other      Limitations to history : None    Samira Nunez is a 29 y.o. female who presents to the emergency department for foreign body in the left hand.  Patient states it is for the last several hours she has had a fishhook in her left hand she states that she was reaching into her trunk to get something when she excellently stuck the fishhook in the palmar surface of her left hand she states that she had pliers in her car and immediately decided to cut the fishhook she states that she can no longer see the end of the fishhook in her hand and now she cannot remove the fishhook at all so she came to the emergency department for treatment states that there is no numbness tingling or weakness she states that the bleeding is controlled.  Patient states that she needs a tetanus vaccine.    Nursing Notes were all reviewed and agreed with or any disagreements were addressed in the HPI.    REVIEW OF SYSTEMS :      Review of Systems   All other systems reviewed and are negative.      Positives and Pertinent negatives as per HPI.     SURGICAL HISTORY     Past Surgical History:   Procedure Laterality Date    CHOLECYSTECTOMY, LAPAROSCOPIC N/A 2/29/2024    LAPAROSCOPIC ROBOTIC XI ASSISTED CHOLECYSTECTOMY WITH INDOCYANINE GREEN CHOLANGIOGRAM performed by Trav Zaidi DO at [facility] OR    UPPER GASTROINTESTINAL ENDOSCOPY N/A 2/2/2024    EGD ESOPHAGOGASTRODUODENOSCOPY performed by Dejuan

## 2024-12-04 ENCOUNTER — TELEPHONE (OUTPATIENT)
Dept: FAMILY MEDICINE CLINIC | Age: 29
End: 2024-12-04

## 2024-12-04 ENCOUNTER — OFFICE VISIT (OUTPATIENT)
Dept: FAMILY MEDICINE CLINIC | Age: 29
End: 2024-12-04

## 2024-12-04 VITALS
DIASTOLIC BLOOD PRESSURE: 70 MMHG | RESPIRATION RATE: 18 BRPM | WEIGHT: 259 LBS | OXYGEN SATURATION: 99 % | BODY MASS INDEX: 43.15 KG/M2 | HEART RATE: 93 BPM | HEIGHT: 65 IN | TEMPERATURE: 97.7 F | SYSTOLIC BLOOD PRESSURE: 126 MMHG

## 2024-12-04 DIAGNOSIS — M79.671 RIGHT FOOT PAIN: Primary | ICD-10-CM

## 2024-12-04 DIAGNOSIS — M77.31 CALCANEAL SPUR OF RIGHT FOOT: Primary | ICD-10-CM

## 2024-12-04 DIAGNOSIS — G47.00 INSOMNIA, UNSPECIFIED TYPE: ICD-10-CM

## 2024-12-04 DIAGNOSIS — E28.2 PCOS (POLYCYSTIC OVARIAN SYNDROME): ICD-10-CM

## 2024-12-04 RX ORDER — KETOROLAC TROMETHAMINE 30 MG/ML
30 INJECTION, SOLUTION INTRAMUSCULAR; INTRAVENOUS ONCE
Status: COMPLETED | OUTPATIENT
Start: 2024-12-04 | End: 2024-12-04

## 2024-12-04 RX ORDER — QUETIAPINE FUMARATE 50 MG/1
50 TABLET, FILM COATED ORAL NIGHTLY
Qty: 90 TABLET | Refills: 0 | Status: SHIPPED | OUTPATIENT
Start: 2024-12-04

## 2024-12-04 RX ORDER — SPIRONOLACTONE 25 MG/1
25 TABLET ORAL 2 TIMES DAILY
Qty: 180 TABLET | Refills: 1 | Status: SHIPPED | OUTPATIENT
Start: 2024-12-04

## 2024-12-04 RX ORDER — TRIAMCINOLONE ACETONIDE 40 MG/ML
40 INJECTION, SUSPENSION INTRA-ARTICULAR; INTRAMUSCULAR ONCE
Status: COMPLETED | OUTPATIENT
Start: 2024-12-04 | End: 2024-12-04

## 2024-12-04 RX ADMIN — KETOROLAC TROMETHAMINE 30 MG: 30 INJECTION, SOLUTION INTRAMUSCULAR; INTRAVENOUS at 08:12

## 2024-12-04 RX ADMIN — TRIAMCINOLONE ACETONIDE 40 MG: 40 INJECTION, SUSPENSION INTRA-ARTICULAR; INTRAMUSCULAR at 08:12

## 2024-12-04 NOTE — PROGRESS NOTES
Height: 1.651 m (5' 5\")       Physical Exam (PE)   Constitutional: Alert, development consistent with age.  HENT:      Head: Normocephalic.      Right Ear: External ear normal.      Left Ear: External ear normal.      Nose: Normal.      Mouth/Throat:     Mouth: Mucous membranes are moist.      Pharynx: Oropharynx is clear.  Eyes: Pupils: Pupils are equal, round, and reactive to light.   Neck: Normal ROM.  Supple.  Cardiovascular: Heart RRR without pathologic murmurs or gallops.   Pulmonary: Respiratory effort normal.  Normal breath sounds.  Abdomen: Soft, nontender, normal bowel sounds.  Foot: right            Tenderness:  mild TTP            Swelling: none noted              Deformity: No obvious deformity.                      ROM: normal ROM.    Ankle: right            Tenderness:  mild TTP               Swelling: mild noted on medial malleolus            Deformity: No obvious deformity noted.            ROM: normal ROM.   Calf: right                Tenderness: no tenderness, erythema, warmth, or edema noted.    Neurovascular:              Sensory deficit: Sensation intact proximal and distal to the injury site.             Pulse deficit: Pulses 2+ and bounding.             Capillary refill: Less then 2 sec throughout.   Skin:   No ecchymosis, abrasions, rashes, or erythema noted.  Gait: Normal gait noted.   Lymphatics: No lymphangitis or adenopathy noted.  Neurological:  Alert and oriented. Motor functions intact.  Psychiatric: Mood and Affect: Mood normal. Behavior: Behavior normal.    Testing:   (All laboratory and radiology results have been personally reviewed by myself)  Labs:  No results found for this visit on 12/04/24.    Imaging:  All Radiology results interpreted by Radiologist unless otherwise noted.  No orders to display       Assessment / Plan:   The patient's vitals, allergies, medications, and past medical history have been reviewed.  Samira was seen today for ankle pain, insomnia and

## 2024-12-04 NOTE — TELEPHONE ENCOUNTER
Pt wants to know will you send in the seroquel that was mentioned at the appt. P sated that regarding her birth control, she's suppose to have the option to skip the placebo. When previously prescribe, she had an option to skip placebo and received 3 packets. The last refill, she only received 1 packet with no option to skip placebo.

## 2024-12-05 NOTE — TELEPHONE ENCOUNTER
Called pt and gave her info on a podiatrist in [place] to call to see if he takes her ins ; told her to call back so I can send referral if they do fyi

## 2024-12-28 ENCOUNTER — PATIENT MESSAGE (OUTPATIENT)
Dept: FAMILY MEDICINE CLINIC | Age: 29
End: 2024-12-28

## 2024-12-28 DIAGNOSIS — R73.03 PREDIABETES: ICD-10-CM

## 2024-12-28 DIAGNOSIS — R53.82 CHRONIC FATIGUE: ICD-10-CM

## 2024-12-28 DIAGNOSIS — R73.9 HYPERGLYCEMIA: Primary | ICD-10-CM

## 2024-12-28 DIAGNOSIS — E55.9 VITAMIN D DEFICIENCY: ICD-10-CM

## 2024-12-29 DIAGNOSIS — F41.9 ANXIETY AND DEPRESSION: ICD-10-CM

## 2024-12-29 DIAGNOSIS — G47.00 INSOMNIA, UNSPECIFIED TYPE: ICD-10-CM

## 2024-12-29 DIAGNOSIS — F32.A ANXIETY AND DEPRESSION: ICD-10-CM

## 2024-12-30 RX ORDER — DOXEPIN HYDROCHLORIDE 10 MG/1
10 CAPSULE ORAL NIGHTLY
Qty: 90 CAPSULE | Refills: 0 | OUTPATIENT
Start: 2024-12-30

## 2025-01-06 DIAGNOSIS — R73.9 HYPERGLYCEMIA: Primary | ICD-10-CM

## 2025-01-06 DIAGNOSIS — R73.03 PREDIABETES: ICD-10-CM

## 2025-01-06 DIAGNOSIS — R53.82 CHRONIC FATIGUE: ICD-10-CM

## 2025-01-06 DIAGNOSIS — E55.9 VITAMIN D DEFICIENCY: ICD-10-CM

## 2025-01-06 DIAGNOSIS — E53.8 VITAMIN B12 DEFICIENCY: ICD-10-CM

## 2025-01-13 ENCOUNTER — LAB (OUTPATIENT)
Dept: FAMILY MEDICINE CLINIC | Age: 30
End: 2025-01-13
Payer: MEDICAID

## 2025-01-13 DIAGNOSIS — R73.03 PREDIABETES: ICD-10-CM

## 2025-01-13 DIAGNOSIS — R73.9 HYPERGLYCEMIA: ICD-10-CM

## 2025-01-13 DIAGNOSIS — R53.82 CHRONIC FATIGUE: ICD-10-CM

## 2025-01-13 DIAGNOSIS — E55.9 VITAMIN D DEFICIENCY: ICD-10-CM

## 2025-01-13 LAB
ALBUMIN: 4.1 G/DL (ref 3.5–5.2)
ALP BLD-CCNC: 78 U/L (ref 35–104)
ALT SERPL-CCNC: 13 U/L (ref 0–32)
ANION GAP SERPL CALCULATED.3IONS-SCNC: 16 MMOL/L (ref 7–16)
AST SERPL-CCNC: 13 U/L (ref 0–31)
BASOPHILS ABSOLUTE: 0.03 K/UL (ref 0–0.2)
BASOPHILS RELATIVE PERCENT: 0 % (ref 0–2)
BILIRUB SERPL-MCNC: 0.2 MG/DL (ref 0–1.2)
BUN BLDV-MCNC: 12 MG/DL (ref 6–20)
CALCIUM SERPL-MCNC: 9.7 MG/DL (ref 8.6–10.2)
CHLORIDE BLD-SCNC: 102 MMOL/L (ref 98–107)
CHOLESTEROL, TOTAL: 173 MG/DL
CO2: 21 MMOL/L (ref 22–29)
CREAT SERPL-MCNC: 0.7 MG/DL (ref 0.5–1)
EOSINOPHILS ABSOLUTE: 0.04 K/UL (ref 0.05–0.5)
EOSINOPHILS RELATIVE PERCENT: 0 % (ref 0–6)
GFR, ESTIMATED: >90 ML/MIN/1.73M2
GLUCOSE BLD-MCNC: 172 MG/DL (ref 74–99)
HBA1C MFR BLD: 6.3 % (ref 4–5.6)
HCT VFR BLD CALC: 39.1 % (ref 34–48)
HDLC SERPL-MCNC: 64 MG/DL
HEMOGLOBIN: 12.8 G/DL (ref 11.5–15.5)
IMMATURE GRANULOCYTES %: 0 % (ref 0–5)
IMMATURE GRANULOCYTES ABSOLUTE: 0.03 K/UL (ref 0–0.58)
LDL CHOLESTEROL: 65 MG/DL
LYMPHOCYTES ABSOLUTE: 2.53 K/UL (ref 1.5–4)
LYMPHOCYTES RELATIVE PERCENT: 27 % (ref 20–42)
MAGNESIUM: 1.8 MG/DL (ref 1.6–2.6)
MCH RBC QN AUTO: 27.6 PG (ref 26–35)
MCHC RBC AUTO-ENTMCNC: 32.7 G/DL (ref 32–34.5)
MCV RBC AUTO: 84.3 FL (ref 80–99.9)
MONOCYTES ABSOLUTE: 0.44 K/UL (ref 0.1–0.95)
MONOCYTES RELATIVE PERCENT: 5 % (ref 2–12)
NEUTROPHILS ABSOLUTE: 6.22 K/UL (ref 1.8–7.3)
NEUTROPHILS RELATIVE PERCENT: 67 % (ref 43–80)
PDW BLD-RTO: 12.9 % (ref 11.5–15)
PLATELET # BLD: 443 K/UL (ref 130–450)
PMV BLD AUTO: 10.1 FL (ref 7–12)
POTASSIUM SERPL-SCNC: 4.3 MMOL/L (ref 3.5–5)
RBC # BLD: 4.64 M/UL (ref 3.5–5.5)
SODIUM BLD-SCNC: 139 MMOL/L (ref 132–146)
TOTAL PROTEIN: 7.3 G/DL (ref 6.4–8.3)
TRIGL SERPL-MCNC: 222 MG/DL
TSH SERPL DL<=0.05 MIU/L-ACNC: 3.24 UIU/ML (ref 0.27–4.2)
VITAMIN D 25-HYDROXY: 28.7 NG/ML (ref 30–100)
VLDLC SERPL CALC-MCNC: 44 MG/DL
WBC # BLD: 9.3 K/UL (ref 4.5–11.5)

## 2025-01-13 PROCEDURE — 36415 COLL VENOUS BLD VENIPUNCTURE: CPT | Performed by: STUDENT IN AN ORGANIZED HEALTH CARE EDUCATION/TRAINING PROGRAM

## 2025-01-13 NOTE — PROGRESS NOTES
Green (1), Lavender (2), Gold  ( 1)drawn from right antecubital on 1attempt. Patient tolerated well.

## 2025-01-14 LAB — VITAMIN B-12: 237 PG/ML (ref 211–946)

## 2025-01-23 DIAGNOSIS — M79.671 RIGHT FOOT PAIN: Primary | ICD-10-CM

## 2025-02-03 ENCOUNTER — OFFICE VISIT (OUTPATIENT)
Dept: PODIATRY | Age: 30
End: 2025-02-03

## 2025-02-03 VITALS
OXYGEN SATURATION: 97 % | BODY MASS INDEX: 43.15 KG/M2 | HEIGHT: 65 IN | WEIGHT: 259 LBS | HEART RATE: 68 BPM | TEMPERATURE: 97.5 F

## 2025-02-03 DIAGNOSIS — R60.0 LOCALIZED EDEMA: ICD-10-CM

## 2025-02-03 DIAGNOSIS — M79.661 RIGHT CALF PAIN: ICD-10-CM

## 2025-02-03 DIAGNOSIS — M66.871 TIBIALIS POSTERIOR TENDON TEAR, NONTRAUMATIC, RIGHT: Primary | ICD-10-CM

## 2025-02-03 DIAGNOSIS — R26.2 DIFFICULTY WALKING: ICD-10-CM

## 2025-02-03 DIAGNOSIS — M25.571 PAIN IN RIGHT ANKLE AND JOINTS OF RIGHT FOOT: ICD-10-CM

## 2025-02-03 NOTE — PROGRESS NOTES
New patient here for right foot pain. Patient has had the pain off and on for several years. She has been diagnosed with Plantar Fasciitis in the past. Patient is wearing a cam walker today. Patient has had a MRI, and a x-ray.   Janette Escalante DO last visit 12/4/2024   Electronically signed by Saray Multani LPN on 2/3/2025 at 2:58 PM

## 2025-02-04 NOTE — PROGRESS NOTES
2/3/25     Samira Nunez    : 1995 Sex: female   Age: 29 y.o.    Patient was referred by: Janette Escalante DO  Patient's PCP/Provider is:  Janette Escalante DO    Subjective:    Patient seen today for evaluation regarding chronic right lower extremity symptoms    Chief Complaint   Patient presents with    Foot Pain     Right foot pain for several years       HPI: Patient stated she has had issues over the last several months.  She has been seen by multiple physicians over that timeframe.  Radiographs were taken as well as recent MRI studies.  Patient was advised on getting evaluated for AFO device right lower extremity.  Patient presented to an additional opinion regarding care options available.  Patient denies any recent injury to the right lower extremity.  She is wearing an offloading boot at this time right.  Patient stated its only helping mildly with her symptoms.  She denies any additional abnormalities at this time.    ROS:  Const: Positives and pertinent negatives as per HPI.     Musculo: Denies symptoms other than stated above.  Neuro: Denies symptoms other than stated above.  Skin: Denies symptoms other than stated above.    Current Medications:    Current Outpatient Medications:     spironolactone (ALDACTONE) 25 MG tablet, Take 1 tablet by mouth 2 times daily, Disp: 180 tablet, Rfl: 1    QUEtiapine (SEROQUEL) 50 MG tablet, Take 1 tablet by mouth at bedtime, Disp: 90 tablet, Rfl: 0    ibuprofen (ADVIL;MOTRIN) 600 MG tablet, Take 1 tablet by mouth 3 times daily as needed for Pain, Disp: 30 tablet, Rfl: 0    drospirenone-ethinyl estradiol (KELLIE) 3-0.02 MG per tablet, Take 1 tablet by mouth nightly, Disp: 1 packet, Rfl: 0    PARoxetine (PAXIL) 20 MG tablet, TAKE 1 TABLET BY MOUTH NIGHTLY, Disp: 90 tablet, Rfl: 1    ondansetron (ZOFRAN-ODT) 4 MG disintegrating tablet, Take 1 tablet by mouth 3 times daily as needed for Nausea or Vomiting, Disp: 21 tablet, Rfl: 0    albuterol sulfate HFA (VENTOLIN HFA)

## 2025-02-06 ENCOUNTER — PREP FOR PROCEDURE (OUTPATIENT)
Dept: PODIATRY | Age: 30
End: 2025-02-06

## 2025-02-06 ENCOUNTER — TELEPHONE (OUTPATIENT)
Dept: PODIATRY | Age: 30
End: 2025-02-06

## 2025-02-06 DIAGNOSIS — M25.571 PAIN IN JOINT, ANKLE AND FOOT, RIGHT: ICD-10-CM

## 2025-02-06 DIAGNOSIS — M66.871 NONTRAUMATIC TEAR OF TIBIALIS POSTERIOR TENDON, RIGHT: ICD-10-CM

## 2025-02-06 NOTE — TELEPHONE ENCOUNTER
Prior Authorization Form:      DEMOGRAPHICS:                     Patient Name:  Rani Colon  Patient :  1995            Insurance:  [place] / [place] / Product Type: *No Product type* /   Insurance ID Number:    Payer/Plan Subscr  Sex Relation Sub. Ins. ID Effective Group Num   1. [employer]* RANI COLON 1995 Female Self [id number]                                    [address] [id number]         DIAGNOSIS & PROCEDURE:                       Procedure/Operation: Repair Posterior Tibial Tendon Right Lower Extremity with Grafting and PRP Augmentation           CPT Code: 80264    Diagnosis:  posterior tibial tendon tear right, pain in right lower extremity    ICD10 Code: M66.871, M25.571    Location:  [facility]    Surgeon:  Dr. Chin Yi    SCHEDULING INFORMATION:                          Date: 25    Time: TBD              Anesthesia:  General                                                       Status:  Outpatient        Special Comments:  None.        Electronically signed by Vandana Lee LPN on 2025 at 8:29 AM

## 2025-02-07 ENCOUNTER — HOSPITAL ENCOUNTER (OUTPATIENT)
Age: 30
Discharge: HOME | End: 2025-02-09
Payer: MEDICAID

## 2025-02-07 ENCOUNTER — HOSPITAL ENCOUNTER (OUTPATIENT)
Dept: ULTRASOUND IMAGING | Age: 30
Discharge: HOME | End: 2025-02-09
Payer: MEDICAID

## 2025-02-07 DIAGNOSIS — R26.2 DIFFICULTY WALKING: ICD-10-CM

## 2025-02-07 DIAGNOSIS — R60.0 LOCALIZED EDEMA: ICD-10-CM

## 2025-02-07 DIAGNOSIS — M79.661 RIGHT CALF PAIN: ICD-10-CM

## 2025-02-07 PROCEDURE — 93971 EXTREMITY STUDY: CPT

## 2025-02-12 ENCOUNTER — OFFICE VISIT (OUTPATIENT)
Dept: FAMILY MEDICINE CLINIC | Age: 30
End: 2025-02-12
Payer: MEDICAID

## 2025-02-12 VITALS
HEIGHT: 65 IN | WEIGHT: 259.8 LBS | OXYGEN SATURATION: 99 % | SYSTOLIC BLOOD PRESSURE: 126 MMHG | DIASTOLIC BLOOD PRESSURE: 80 MMHG | RESPIRATION RATE: 18 BRPM | BODY MASS INDEX: 43.28 KG/M2 | HEART RATE: 99 BPM | TEMPERATURE: 97.3 F

## 2025-02-12 DIAGNOSIS — Z01.818 PRE-OP EXAM: Primary | ICD-10-CM

## 2025-02-12 PROCEDURE — 99214 OFFICE O/P EST MOD 30 MIN: CPT | Performed by: STUDENT IN AN ORGANIZED HEALTH CARE EDUCATION/TRAINING PROGRAM

## 2025-02-12 PROCEDURE — 93000 ELECTROCARDIOGRAM COMPLETE: CPT | Performed by: STUDENT IN AN ORGANIZED HEALTH CARE EDUCATION/TRAINING PROGRAM

## 2025-02-12 SDOH — ECONOMIC STABILITY: FOOD INSECURITY: WITHIN THE PAST 12 MONTHS, YOU WORRIED THAT YOUR FOOD WOULD RUN OUT BEFORE YOU GOT MONEY TO BUY MORE.: NEVER TRUE

## 2025-02-12 SDOH — ECONOMIC STABILITY: FOOD INSECURITY: WITHIN THE PAST 12 MONTHS, THE FOOD YOU BOUGHT JUST DIDN'T LAST AND YOU DIDN'T HAVE MONEY TO GET MORE.: NEVER TRUE

## 2025-02-12 ASSESSMENT — PATIENT HEALTH QUESTIONNAIRE - PHQ9
2. FEELING DOWN, DEPRESSED OR HOPELESS: NOT AT ALL
9. THOUGHTS THAT YOU WOULD BE BETTER OFF DEAD, OR OF HURTING YOURSELF: NOT AT ALL
8. MOVING OR SPEAKING SO SLOWLY THAT OTHER PEOPLE COULD HAVE NOTICED. OR THE OPPOSITE, BEING SO FIGETY OR RESTLESS THAT YOU HAVE BEEN MOVING AROUND A LOT MORE THAN USUAL: NOT AT ALL
SUM OF ALL RESPONSES TO PHQ QUESTIONS 1-9: 0
SUM OF ALL RESPONSES TO PHQ QUESTIONS 1-9: 0
10. IF YOU CHECKED OFF ANY PROBLEMS, HOW DIFFICULT HAVE THESE PROBLEMS MADE IT FOR YOU TO DO YOUR WORK, TAKE CARE OF THINGS AT HOME, OR GET ALONG WITH OTHER PEOPLE: NOT DIFFICULT AT ALL
1. LITTLE INTEREST OR PLEASURE IN DOING THINGS: NOT AT ALL
7. TROUBLE CONCENTRATING ON THINGS, SUCH AS READING THE NEWSPAPER OR WATCHING TELEVISION: NOT AT ALL
SUM OF ALL RESPONSES TO PHQ9 QUESTIONS 1 & 2: 0
4. FEELING TIRED OR HAVING LITTLE ENERGY: NOT AT ALL
5. POOR APPETITE OR OVEREATING: NOT AT ALL
SUM OF ALL RESPONSES TO PHQ QUESTIONS 1-9: 0
6. FEELING BAD ABOUT YOURSELF - OR THAT YOU ARE A FAILURE OR HAVE LET YOURSELF OR YOUR FAMILY DOWN: NOT AT ALL
SUM OF ALL RESPONSES TO PHQ QUESTIONS 1-9: 0
3. TROUBLE FALLING OR STAYING ASLEEP: NOT AT ALL

## 2025-02-12 NOTE — PROGRESS NOTES
Diagnosis Date    ADHD (attention deficit hyperactivity disorder)     Anxiety     Asthma     Chronic fatigue     Class 3 severe obesity due to excess calories with serious comorbidity and body mass index (BMI) of 50.0 to 59.9 in adult     COVID-19 12/01/2023    Elevated liver transaminase level 09/17/2021    Kidney stone     Migraine without aura 09/17/2021    PCOS (polycystic ovarian syndrome)     Sleep apnea     non compliant with cpap     Past Surgical History:   Procedure Laterality Date    CHOLECYSTECTOMY, LAPAROSCOPIC N/A 2/29/2024    LAPAROSCOPIC ROBOTIC XI ASSISTED CHOLECYSTECTOMY WITH INDOCYANINE GREEN CHOLANGIOGRAM performed by Trav Zaidi DO at [facility] OR    UPPER GASTROINTESTINAL ENDOSCOPY N/A 2/2/2024    EGD ESOPHAGOGASTRODUODENOSCOPY performed by Trav Zaidi DO at [facility] ENDOSCOPY    UPPER GASTROINTESTINAL ENDOSCOPY N/A 2/28/2024    EGD ESOPHAGOGASTRODUODENOSCOPY performed by Trav Zaidi DO at [facility] ENDOSCOPY     Family History   Problem Relation Age of Onset    Other Mother         diverticulitis    Diabetes Mother     Migraines Mother     Cirrhosis Mother     Migraines Father     Kidney Disease Sister     Atrial Fibrillation Maternal Grandmother     Heart Attack Maternal Grandmother     Diabetes Maternal Grandmother     Heart Attack Maternal Grandfather      Social History     Socioeconomic History    Marital status: Single     Spouse name: Not on file    Number of children: Not on file    Years of education: Not on file    Highest education level: Not on file   Occupational History    Not on file   Tobacco Use    Smoking status: Never     Passive exposure: Past    Smokeless tobacco: Never   Vaping Use    Vaping status: Never Used   Substance and Sexual Activity    Alcohol use: Not Currently     Comment: occas.    Drug use: Never    Sexual activity: Yes     Partners: Female   Other Topics Concern    Not on file   Social History Narrative    Not on file     Social Determinants of Health

## 2025-02-13 ENCOUNTER — ANESTHESIA EVENT (OUTPATIENT)
Dept: OPERATING ROOM | Age: 30
End: 2025-02-13
Payer: MEDICAID

## 2025-02-13 NOTE — ANESTHESIA PRE PROCEDURE
Department of Anesthesiology  Preprocedure Note       Name:  Samira Nunez   Age:  29 y.o.  :  1995                                          MRN:  [id number]         Date:  2025      Surgeon: Surgeon(s):  Chin Yi Jr., DPM    Procedure: Procedure(s):  Repair Posterior Tibial Tendon Right Lower Extremity with Grafting and PRP Augmentation    Medications prior to admission:   Prior to Admission medications    Medication Sig Start Date End Date Taking? Authorizing Provider   spironolactone (ALDACTONE) 25 MG tablet Take 1 tablet by mouth 2 times daily 24   Janette Escalante DO   QUEtiapine (SEROQUEL) 50 MG tablet Take 1 tablet by mouth at bedtime 24   Janette Escalante DO   ibuprofen (ADVIL;MOTRIN) 600 MG tablet Take 1 tablet by mouth 3 times daily as needed for Pain 24   Mariangel Smart, APRN - CNP   drospirenone-ethinyl estradiol (KELLIE) 3-0.02 MG per tablet Take 1 tablet by mouth nightly 24   Rosi Yusuf DO   rizatriptan (MAXALT) 10 MG tablet Take 1 tablet by mouth once as needed for Migraine May repeat in 2 hours if needed 24  Rosi Yusuf DO   PARoxetine (PAXIL) 20 MG tablet TAKE 1 TABLET BY MOUTH NIGHTLY 24   Janette Escalante DO   amphetamine-dextroamphetamine (ADDERALL) 20 MG tablet Take 1 tablet by mouth 2 times daily for 30 days. QAM & AFTERNOON Max Daily Amount: 40 mg 24  Janette Escalante DO   tamsulosin (FLOMAX) 0.4 MG capsule Take 1 capsule by mouth daily for 15 days 24  Jamar Caro DO   ondansetron (ZOFRAN-ODT) 4 MG disintegrating tablet Take 1 tablet by mouth 3 times daily as needed for Nausea or Vomiting 24   Jamar Caro DO   albuterol sulfate HFA (VENTOLIN HFA) 108 (90 Base) MCG/ACT inhaler Inhale 2 puffs into the lungs every 4 hours as needed for Wheezing 24   Janette Escalante DO   omeprazole (PRILOSEC) 40 MG delayed release capsule Take 1 capsule by mouth every morning (before

## 2025-02-14 ENCOUNTER — OFFICE VISIT (OUTPATIENT)
Dept: PODIATRY | Age: 30
End: 2025-02-14
Payer: MEDICAID

## 2025-02-14 VITALS — BODY MASS INDEX: 42.49 KG/M2 | WEIGHT: 255 LBS | HEIGHT: 65 IN

## 2025-02-14 DIAGNOSIS — M25.571 PAIN IN RIGHT ANKLE AND JOINTS OF RIGHT FOOT: ICD-10-CM

## 2025-02-14 DIAGNOSIS — M66.871 NONTRAUMATIC TEAR OF TIBIALIS POSTERIOR TENDON, RIGHT: Primary | ICD-10-CM

## 2025-02-14 PROCEDURE — 99213 OFFICE O/P EST LOW 20 MIN: CPT | Performed by: PODIATRIST

## 2025-02-14 NOTE — PROGRESS NOTES
Patient is in today due to increased burning sensations and pains in her right foot/leg. Patient says these symptoms are new. Pcp is Janette Escalante DO Last ov 2/12/25

## 2025-02-14 NOTE — PROGRESS NOTES
25     Samira Nunez    : 1995   Sex: female    Age: 29 y.o.    Patient's PCP/Provider is:  Janette Escalante DO    Subjective:  Patient is seen today for follow-up regarding continued to care regarding right foot and ankle issues.  Overall patient is doing well with the cam walker at this time.  She was concerned about some increased burning to the right lower extremity.  Patient denies any recent injuries or changes in activities.  Patient was glad that we did get the ultrasound which was negative for DVT on the symptomatic right lower extremity.  No other additional abnormalities noted at this time.    Chief Complaint   Patient presents with    Foot Pain     Right foot        ROS:  Const: Positives and pertinent negatives as per HPI.    Musculo: Denies symptoms other than stated above.  Neuro: Denies symptoms other than stated above.  Skin: Denies symptoms other than stated above.    Current Medications:    Current Outpatient Medications:     spironolactone (ALDACTONE) 25 MG tablet, Take 1 tablet by mouth 2 times daily, Disp: 180 tablet, Rfl: 1    QUEtiapine (SEROQUEL) 50 MG tablet, Take 1 tablet by mouth at bedtime (Patient taking differently: Take 0.5 tablets by mouth at bedtime), Disp: 90 tablet, Rfl: 0    ibuprofen (ADVIL;MOTRIN) 600 MG tablet, Take 1 tablet by mouth 3 times daily as needed for Pain, Disp: 30 tablet, Rfl: 0    drospirenone-ethinyl estradiol (KELLIE) 3-0.02 MG per tablet, Take 1 tablet by mouth nightly, Disp: 1 packet, Rfl: 0    PARoxetine (PAXIL) 20 MG tablet, TAKE 1 TABLET BY MOUTH NIGHTLY, Disp: 90 tablet, Rfl: 1    albuterol sulfate HFA (VENTOLIN HFA) 108 (90 Base) MCG/ACT inhaler, Inhale 2 puffs into the lungs every 4 hours as needed for Wheezing, Disp: 18 g, Rfl: 3    albuterol (ACCUNEB) 1.25 MG/3ML nebulizer solution, Inhale 3 mLs into the lungs every 4-6 hours as needed for Wheezing or Shortness of Breath, Disp: , Rfl:     fluticasone (FLONASE) 50 MCG/ACT nasal spray, 2

## 2025-02-19 RX ORDER — SODIUM CHLORIDE 0.9 % (FLUSH) 0.9 %
5-40 SYRINGE (ML) INJECTION EVERY 12 HOURS SCHEDULED
Status: CANCELLED | OUTPATIENT
Start: 2025-02-19

## 2025-02-19 RX ORDER — SODIUM CHLORIDE 0.9 % (FLUSH) 0.9 %
5-40 SYRINGE (ML) INJECTION PRN
Status: CANCELLED | OUTPATIENT
Start: 2025-02-19

## 2025-02-19 RX ORDER — SODIUM CHLORIDE 9 MG/ML
INJECTION, SOLUTION INTRAVENOUS PRN
Status: CANCELLED | OUTPATIENT
Start: 2025-02-19

## 2025-02-20 ENCOUNTER — HOSPITAL ENCOUNTER (OUTPATIENT)
Age: 30
Setting detail: OUTPATIENT SURGERY
Discharge: HOME | End: 2025-02-20
Attending: PODIATRIST | Admitting: PODIATRIST
Payer: MEDICAID

## 2025-02-20 ENCOUNTER — HOSPITAL ENCOUNTER (OUTPATIENT)
Dept: OPERATING ROOM | Age: 30
Setting detail: OUTPATIENT SURGERY
Discharge: HOME | End: 2025-02-20
Attending: PODIATRIST
Payer: MEDICAID

## 2025-02-20 ENCOUNTER — ANESTHESIA (OUTPATIENT)
Dept: OPERATING ROOM | Age: 30
End: 2025-02-20
Payer: MEDICAID

## 2025-02-20 ENCOUNTER — PATIENT MESSAGE (OUTPATIENT)
Dept: FAMILY MEDICINE CLINIC | Age: 30
End: 2025-02-20

## 2025-02-20 VITALS
OXYGEN SATURATION: 97 % | WEIGHT: 255 LBS | DIASTOLIC BLOOD PRESSURE: 81 MMHG | HEIGHT: 65 IN | RESPIRATION RATE: 16 BRPM | TEMPERATURE: 97 F | BODY MASS INDEX: 42.49 KG/M2 | HEART RATE: 91 BPM | SYSTOLIC BLOOD PRESSURE: 145 MMHG

## 2025-02-20 DIAGNOSIS — R00.0 TACHYCARDIA: Primary | ICD-10-CM

## 2025-02-20 DIAGNOSIS — M66.871 NONTRAUMATIC TEAR OF TIBIALIS POSTERIOR TENDON, RIGHT: Primary | ICD-10-CM

## 2025-02-20 DIAGNOSIS — M25.571 PAIN IN JOINT, ANKLE AND FOOT, RIGHT: ICD-10-CM

## 2025-02-20 DIAGNOSIS — M66.871 NONTRAUMATIC TEAR OF TIBIALIS POSTERIOR TENDON, RIGHT: ICD-10-CM

## 2025-02-20 LAB
HCG, URINE, POC: NEGATIVE
Lab: NORMAL
NEGATIVE QC PASS/FAIL: NORMAL
POSITIVE QC PASS/FAIL: NORMAL

## 2025-02-20 PROCEDURE — 81025 URINE PREGNANCY TEST: CPT | Performed by: PODIATRIST

## 2025-02-20 PROCEDURE — 2500000003 HC RX 250 WO HCPCS: Performed by: PODIATRIST

## 2025-02-20 PROCEDURE — 3700000001 HC ADD 15 MINUTES (ANESTHESIA): Performed by: PODIATRIST

## 2025-02-20 PROCEDURE — 3700000000 HC ANESTHESIA ATTENDED CARE: Performed by: PODIATRIST

## 2025-02-20 PROCEDURE — 7100000001 HC PACU RECOVERY - ADDTL 15 MIN: Performed by: PODIATRIST

## 2025-02-20 PROCEDURE — 6360000002 HC RX W HCPCS: Performed by: PODIATRIST

## 2025-02-20 PROCEDURE — 3600000003 HC SURGERY LEVEL 3 BASE: Performed by: PODIATRIST

## 2025-02-20 PROCEDURE — 2709999900 HC NON-CHARGEABLE SUPPLY: Performed by: PODIATRIST

## 2025-02-20 PROCEDURE — 6360000002 HC RX W HCPCS: Performed by: ANESTHESIOLOGY

## 2025-02-20 PROCEDURE — 6360000002 HC RX W HCPCS

## 2025-02-20 PROCEDURE — 6370000000 HC RX 637 (ALT 250 FOR IP): Performed by: ANESTHESIOLOGY

## 2025-02-20 PROCEDURE — 2580000003 HC RX 258: Performed by: ANESTHESIOLOGY

## 2025-02-20 PROCEDURE — 3600000013 HC SURGERY LEVEL 3 ADDTL 15MIN: Performed by: PODIATRIST

## 2025-02-20 PROCEDURE — 7100000011 HC PHASE II RECOVERY - ADDTL 15 MIN: Performed by: PODIATRIST

## 2025-02-20 PROCEDURE — 2720000010 HC SURG SUPPLY STERILE: Performed by: PODIATRIST

## 2025-02-20 PROCEDURE — 7100000000 HC PACU RECOVERY - FIRST 15 MIN: Performed by: PODIATRIST

## 2025-02-20 PROCEDURE — 7100000010 HC PHASE II RECOVERY - FIRST 15 MIN: Performed by: PODIATRIST

## 2025-02-20 PROCEDURE — 27659 REPAIR OF LEG TENDON EACH: CPT | Performed by: PODIATRIST

## 2025-02-20 DEVICE — GRAFT HUM TISS W30XL40MM THK0.5MM ACELLULAR DERM RM TEMP: Type: IMPLANTABLE DEVICE | Site: FOOT | Status: FUNCTIONAL

## 2025-02-20 RX ORDER — FENTANYL CITRATE 50 UG/ML
INJECTION, SOLUTION INTRAMUSCULAR; INTRAVENOUS
Status: DISCONTINUED | OUTPATIENT
Start: 2025-02-20 | End: 2025-02-20 | Stop reason: SDUPTHER

## 2025-02-20 RX ORDER — DEXAMETHASONE SODIUM PHOSPHATE 10 MG/ML
INJECTION, SOLUTION INTRAMUSCULAR; INTRAVENOUS
Status: DISCONTINUED | OUTPATIENT
Start: 2025-02-20 | End: 2025-02-20 | Stop reason: SDUPTHER

## 2025-02-20 RX ORDER — SODIUM CHLORIDE 9 MG/ML
INJECTION, SOLUTION INTRAVENOUS PRN
Status: DISCONTINUED | OUTPATIENT
Start: 2025-02-20 | End: 2025-02-20 | Stop reason: HOSPADM

## 2025-02-20 RX ORDER — SODIUM CHLORIDE 0.9 % (FLUSH) 0.9 %
5-40 SYRINGE (ML) INJECTION EVERY 12 HOURS SCHEDULED
Status: DISCONTINUED | OUTPATIENT
Start: 2025-02-20 | End: 2025-02-20 | Stop reason: HOSPADM

## 2025-02-20 RX ORDER — ONDANSETRON 2 MG/ML
INJECTION INTRAMUSCULAR; INTRAVENOUS
Status: DISCONTINUED | OUTPATIENT
Start: 2025-02-20 | End: 2025-02-20 | Stop reason: SDUPTHER

## 2025-02-20 RX ORDER — PROPOFOL 10 MG/ML
INJECTION, EMULSION INTRAVENOUS
Status: DISCONTINUED | OUTPATIENT
Start: 2025-02-20 | End: 2025-02-20 | Stop reason: SDUPTHER

## 2025-02-20 RX ORDER — NALOXONE HYDROCHLORIDE 0.4 MG/ML
INJECTION, SOLUTION INTRAMUSCULAR; INTRAVENOUS; SUBCUTANEOUS PRN
Status: DISCONTINUED | OUTPATIENT
Start: 2025-02-20 | End: 2025-02-20 | Stop reason: HOSPADM

## 2025-02-20 RX ORDER — SODIUM CHLORIDE 0.9 % (FLUSH) 0.9 %
5-40 SYRINGE (ML) INJECTION PRN
Status: DISCONTINUED | OUTPATIENT
Start: 2025-02-20 | End: 2025-02-20 | Stop reason: HOSPADM

## 2025-02-20 RX ORDER — MIDAZOLAM HYDROCHLORIDE 1 MG/ML
INJECTION, SOLUTION INTRAMUSCULAR; INTRAVENOUS
Status: DISCONTINUED | OUTPATIENT
Start: 2025-02-20 | End: 2025-02-20 | Stop reason: SDUPTHER

## 2025-02-20 RX ORDER — ONDANSETRON 2 MG/ML
4 INJECTION INTRAMUSCULAR; INTRAVENOUS
Status: DISCONTINUED | OUTPATIENT
Start: 2025-02-20 | End: 2025-02-20 | Stop reason: HOSPADM

## 2025-02-20 RX ORDER — BUPIVACAINE HYDROCHLORIDE 5 MG/ML
INJECTION, SOLUTION EPIDURAL; INTRACAUDAL PRN
Status: DISCONTINUED | OUTPATIENT
Start: 2025-02-20 | End: 2025-02-20 | Stop reason: ALTCHOICE

## 2025-02-20 RX ORDER — HYDROCODONE BITARTRATE AND ACETAMINOPHEN 5; 325 MG/1; MG/1
1 TABLET ORAL ONCE
Status: COMPLETED | OUTPATIENT
Start: 2025-02-20 | End: 2025-02-20

## 2025-02-20 RX ORDER — DIPHENHYDRAMINE HYDROCHLORIDE 50 MG/ML
INJECTION INTRAMUSCULAR; INTRAVENOUS
Status: DISCONTINUED | OUTPATIENT
Start: 2025-02-20 | End: 2025-02-20 | Stop reason: SDUPTHER

## 2025-02-20 RX ORDER — KETOROLAC TROMETHAMINE 30 MG/ML
INJECTION, SOLUTION INTRAMUSCULAR; INTRAVENOUS
Status: DISCONTINUED | OUTPATIENT
Start: 2025-02-20 | End: 2025-02-20 | Stop reason: SDUPTHER

## 2025-02-20 RX ORDER — HYDROCODONE BITARTRATE AND ACETAMINOPHEN 5; 325 MG/1; MG/1
1 TABLET ORAL EVERY 6 HOURS PRN
Qty: 20 TABLET | Refills: 0 | Status: SHIPPED | OUTPATIENT
Start: 2025-02-20 | End: 2025-02-25

## 2025-02-20 RX ORDER — SODIUM CHLORIDE, SODIUM LACTATE, POTASSIUM CHLORIDE, CALCIUM CHLORIDE 600; 310; 30; 20 MG/100ML; MG/100ML; MG/100ML; MG/100ML
INJECTION, SOLUTION INTRAVENOUS CONTINUOUS
Status: DISCONTINUED | OUTPATIENT
Start: 2025-02-20 | End: 2025-02-20 | Stop reason: HOSPADM

## 2025-02-20 RX ORDER — MORPHINE SULFATE 10 MG/ML
INJECTION INTRAVENOUS
Status: DISCONTINUED | OUTPATIENT
Start: 2025-02-20 | End: 2025-02-20 | Stop reason: SDUPTHER

## 2025-02-20 RX ADMIN — DIPHENHYDRAMINE HYDROCHLORIDE 12.5 MG: 50 INJECTION INTRAMUSCULAR; INTRAVENOUS at 08:25

## 2025-02-20 RX ADMIN — PROPOFOL 200 MG: 10 INJECTION, EMULSION INTRAVENOUS at 08:21

## 2025-02-20 RX ADMIN — CEFAZOLIN 2000 MG: 1 INJECTION, POWDER, FOR SOLUTION INTRAMUSCULAR; INTRAVENOUS at 08:17

## 2025-02-20 RX ADMIN — DEXAMETHASONE SODIUM PHOSPHATE 10 MG: 10 INJECTION, SOLUTION INTRAMUSCULAR; INTRAVENOUS at 08:25

## 2025-02-20 RX ADMIN — HYDROCODONE BITARTRATE AND ACETAMINOPHEN 1 TABLET: 5; 325 TABLET ORAL at 10:21

## 2025-02-20 RX ADMIN — MIDAZOLAM 2 MG: 1 INJECTION INTRAMUSCULAR; INTRAVENOUS at 08:15

## 2025-02-20 RX ADMIN — FENTANYL CITRATE 100 MCG: 50 INJECTION, SOLUTION INTRAMUSCULAR; INTRAVENOUS at 08:21

## 2025-02-20 RX ADMIN — KETOROLAC TROMETHAMINE 30 MG: 30 INJECTION, SOLUTION INTRAMUSCULAR; INTRAVENOUS at 09:06

## 2025-02-20 RX ADMIN — MORPHINE SULFATE 2 MG: 10 INJECTION INTRAVENOUS at 08:51

## 2025-02-20 RX ADMIN — MORPHINE SULFATE 4 MG: 10 INJECTION INTRAVENOUS at 08:38

## 2025-02-20 RX ADMIN — SODIUM CHLORIDE, POTASSIUM CHLORIDE, SODIUM LACTATE AND CALCIUM CHLORIDE: 600; 310; 30; 20 INJECTION, SOLUTION INTRAVENOUS at 07:42

## 2025-02-20 RX ADMIN — HYDROMORPHONE HYDROCHLORIDE 0.5 MG: 1 INJECTION, SOLUTION INTRAMUSCULAR; INTRAVENOUS; SUBCUTANEOUS at 09:33

## 2025-02-20 RX ADMIN — MORPHINE SULFATE 4 MG: 10 INJECTION INTRAVENOUS at 08:40

## 2025-02-20 RX ADMIN — ONDANSETRON 4 MG: 2 INJECTION, SOLUTION INTRAMUSCULAR; INTRAVENOUS at 08:52

## 2025-02-20 ASSESSMENT — PAIN DESCRIPTION - DESCRIPTORS
DESCRIPTORS: ACHING
DESCRIPTORS: DISCOMFORT
DESCRIPTORS: BURNING;ACHING
DESCRIPTORS: ACHING

## 2025-02-20 ASSESSMENT — PAIN - FUNCTIONAL ASSESSMENT
PAIN_FUNCTIONAL_ASSESSMENT: 0-10
PAIN_FUNCTIONAL_ASSESSMENT: NONE - DENIES PAIN

## 2025-02-20 ASSESSMENT — PAIN DESCRIPTION - LOCATION
LOCATION: FOOT
LOCATION: FOOT

## 2025-02-20 ASSESSMENT — PAIN DESCRIPTION - ORIENTATION
ORIENTATION: RIGHT
ORIENTATION: RIGHT

## 2025-02-20 ASSESSMENT — PAIN SCALES - GENERAL
PAINLEVEL_OUTOF10: 5
PAINLEVEL_OUTOF10: 5

## 2025-02-20 NOTE — PROGRESS NOTES
Pt states that she's having chest pain, pressure similarly to when she received Fentanyl before.  Resps easy and reg, lungs clear, Hr 80bpm, sinus rhythm, O2 sat 98% on room air.

## 2025-02-20 NOTE — DISCHARGE INSTRUCTIONS
DR. HOFFMANN'S AMBULATORY PROCEDURE DISCHARGE INSTRUCTIONS  You may be drowsy or lightheaded after receiving sedation or anesthesia.    A responsible person should be with you for the next 24 hours.    Please follow the instructions checked below:    DIET INSTRUCTIONS:  [x]Start with light diet and progress to your normal diet as you feel like eating. If you experience nausea or repeated episodes of vomiting which persist beyond 12-24 hours, notify your doctor.  []Other     ACTIVITY INSTRUCTIONS:  [x]Rest today. Increase activity as tolerated    [x]Elevate operative limb   []Sling to operative limb  []No heavy lifting or strenuous activity     []No driving for ***   []Use crutches  []Use walker   [x]Weight bearing: [x]none /  []partial / []full as tolerated   []Other ***    WOUND/DRESSING INSTRUCTIONS:  Always ensure you and your care giver clean hands before and after caring for the wound.  []May shower      []May bathe      [x]Keep dressing dry            [x]Do not remove dressing   []Remove dressing on     []Leave steri strips in place    []Drain care      [x] Ice to operative site for 15-30 minutes of each hour while awake for 24-36 hours  []Use ice cooling system as instructed                  []Post-op Shoe-wear when up and about                     [x]Other: Use cam walker with all ambulatory activities and crutches for nonweightbearing status.  Cam walker can be removed when sitting and at bedtime.     MEDICATION INSTRUCTIONS:    [x]Prescriptions sent with you.  Use as directed.  When taking pain medications, you may experience dizziness or drowsiness.  Do not drink alcohol or drive when taking these medications.  [x]You may take a non-prescription anti-inflammatory medication as needed for pain relief.  [x]Give the list of your medications to your primary care physician on your next visit. Keep your med list updated and carry it with in case of emergency  Other Instructions:                  FOLLOW-UP

## 2025-02-20 NOTE — ANESTHESIA POSTPROCEDURE EVALUATION
Department of Anesthesiology  Postprocedure Note    Patient: Samira Nunez  MRN: [id number]  YOB: 1995  Date of evaluation: 2/20/2025    Procedure Summary       Date: 02/20/25 Room / Location: [address]    Anesthesia Start: 0815 Anesthesia Stop: 0918    Procedure: Repair Posterior Tibial Tendon Right Lower Extremity with Grafting and PRP Augmentation (Right) Diagnosis:       Nontraumatic tear of tibialis posterior tendon, right      Pain in joint, ankle and foot, right      (Nontraumatic tear of tibialis posterior tendon, right [M66.871])      (Pain in joint, ankle and foot, right [M25.571])    Surgeons: Chin Yi Jr., DPM Responsible Provider: Lobito Morris DO    Anesthesia Type: General ASA Status: 3            Anesthesia Type: General    Grecia Phase I: Grecia Score: 10    Grecia Phase II: Grecia Score: 10    Anesthesia Post Evaluation    Patient location during evaluation: PACU  Patient participation: complete - patient participated  Level of consciousness: awake and alert  Pain score: 1  Airway patency: patent  Nausea & Vomiting: no nausea and no vomiting  Cardiovascular status: hemodynamically stable  Respiratory status: acceptable  Hydration status: euvolemic  Pain management: adequate    No notable events documented.

## 2025-02-20 NOTE — OP NOTE
Operative Note      Patient: Samira Nunez  YOB: 1995  MRN: [id number]    Date of Procedure: 2/20/2025    Pre-Op Diagnosis Codes:      * Nontraumatic tear of tibialis posterior tendon, right [M66.871]     * Pain in joint, ankle and foot, right [M25.571]    Post-Op Diagnosis: Same       Procedure(s):  Repair Posterior Tibial Tendon Right Lower Extremity with Grafting and PRP Augmentation    Surgeon(s):  Chin Yi Jr., SYLVESTER    Assistant:   * No surgical staff found *    Anesthesia: General    Estimated Blood Loss (mL): Minimal    Complications: None    Specimens:   * No specimens in log *    Implants:  Implant Name Type Inv. Item Serial No.  Lot No. LRB No. Used Action   GRAFT HUM TISS [id number] THK0.5MM ACELLULAR DERM RM TEMP - [id number]  GRAFT HUM TISS [id number] THK0.5MM ACELLULAR DERM RM TEMP [id number] [facility] TISSUE [facility]-  Right 1 Implanted         Drains: * No LDAs found *    Findings:  Infection Present At Time Of Surgery (PATOS) (choose all levels that have infection present):  No infection present  Other Findings: Chronic posterior tibial tendon tear right lower extremity    Detailed Description of Procedure:   After proper preoperative evaluation, patient is was brought back to the operating room placed operating table in the supine position.  General anesthesia was administered per anesthesia department.  Patient did receive 2 g intravenous Ancef preoperatively.  Tourniquet was placed around the patient's well-padded right calf inflated to 300 mmHg then lowered to the surgical field once proper prepping and draping was performed.  Fluoroscopic imaging was utilized initially to note tendon tear just plantar and distal to the medial malleoli region right.  At this time approximately a 3-1/2 cm curvilinear incision was made medial hindfoot/ankle region.  Incision was carried down through subcutaneous tissue layers with all vital structures being bovied, and  postoperative appointment.      Electronically signed by Chin Yi Jr, DPM on 2/20/2025 at 9:27 AM

## 2025-02-20 NOTE — H&P
Update History & Physical     The patient's History and Physical this morning was reviewed with the patient and there were no significant changes.     I examined the patient and there were no significant changes from the previous History and Physical.     Plan: The risk, benefits, expected outcome, and alternative to the recommended procedure have been discussed with the patient.  Patient understands and wants to proceed with the procedure. The procedure discussed is 1. Repair Posterior tibial tendon tear right lower extremity with grafting and PRP augmentation       Blood pressure 133/83, pulse 91, temperature 97 °F (36.1 °C), temperature source Infrared, resp. rate 16, height 1.651 m (5' 5\"), weight 115.7 kg (255 lb), last menstrual period 02/13/2025, SpO2 96%.    Electronically signed by Chin Yi Jr, DPM on 2/20/2025 at 8:11 AM

## 2025-02-24 ENCOUNTER — HOSPITAL ENCOUNTER (OUTPATIENT)
Age: 30
Discharge: HOME | End: 2025-02-24
Payer: MEDICAID

## 2025-02-24 ENCOUNTER — OFFICE VISIT (OUTPATIENT)
Dept: PODIATRY | Age: 30
End: 2025-02-24
Payer: MEDICAID

## 2025-02-24 VITALS — BODY MASS INDEX: 42.49 KG/M2 | WEIGHT: 255 LBS | HEIGHT: 65 IN

## 2025-02-24 DIAGNOSIS — R79.89 ELEVATED CORTISOL LEVEL: Primary | ICD-10-CM

## 2025-02-24 DIAGNOSIS — R00.0 TACHYCARDIA: ICD-10-CM

## 2025-02-24 DIAGNOSIS — R26.2 DIFFICULTY WALKING: ICD-10-CM

## 2025-02-24 DIAGNOSIS — S96.912A ANKLE STRAIN, LEFT, INITIAL ENCOUNTER: ICD-10-CM

## 2025-02-24 DIAGNOSIS — E53.8 VITAMIN B12 DEFICIENCY: Primary | ICD-10-CM

## 2025-02-24 DIAGNOSIS — M66.871 NONTRAUMATIC TEAR OF TIBIALIS POSTERIOR TENDON, RIGHT: Primary | ICD-10-CM

## 2025-02-24 LAB
25(OH)D3 SERPL-MCNC: 30.7 NG/ML (ref 30–100)
ALBUMIN SERPL-MCNC: 4.1 G/DL (ref 3.5–5.2)
ALP SERPL-CCNC: 77 U/L (ref 35–104)
ALT SERPL-CCNC: 22 U/L (ref 0–32)
ANION GAP SERPL CALCULATED.3IONS-SCNC: 13 MMOL/L (ref 7–16)
AST SERPL-CCNC: 11 U/L (ref 0–31)
BASOPHILS # BLD: 0.03 K/UL (ref 0–0.2)
BASOPHILS NFR BLD: 0 % (ref 0–2)
BILIRUB SERPL-MCNC: 0.3 MG/DL (ref 0–1.2)
BUN SERPL-MCNC: 15 MG/DL (ref 6–20)
CALCIUM SERPL-MCNC: 9.8 MG/DL (ref 8.6–10.2)
CHLORIDE SERPL-SCNC: 103 MMOL/L (ref 98–107)
CO2 SERPL-SCNC: 22 MMOL/L (ref 22–29)
CORTIS SERPL-MCNC: 28.3 UG/DL (ref 2.7–18.4)
CREAT SERPL-MCNC: 0.8 MG/DL (ref 0.5–1)
EOSINOPHIL # BLD: 0.12 K/UL (ref 0.05–0.5)
EOSINOPHILS RELATIVE PERCENT: 1 % (ref 0–6)
ERYTHROCYTE [DISTWIDTH] IN BLOOD BY AUTOMATED COUNT: 13.3 % (ref 11.5–15)
GFR, ESTIMATED: >90 ML/MIN/1.73M2
GLUCOSE SERPL-MCNC: 123 MG/DL (ref 74–99)
HBA1C MFR BLD: 6.2 % (ref 4–5.6)
HCT VFR BLD AUTO: 41.4 % (ref 34–48)
HGB BLD-MCNC: 13.4 G/DL (ref 11.5–15.5)
IMM GRANULOCYTES # BLD AUTO: 0.04 K/UL (ref 0–0.58)
IMM GRANULOCYTES NFR BLD: 0 % (ref 0–5)
LYMPHOCYTES NFR BLD: 2.88 K/UL (ref 1.5–4)
LYMPHOCYTES RELATIVE PERCENT: 26 % (ref 20–42)
MAGNESIUM SERPL-MCNC: 2 MG/DL (ref 1.6–2.6)
MCH RBC QN AUTO: 27.9 PG (ref 26–35)
MCHC RBC AUTO-ENTMCNC: 32.4 G/DL (ref 32–34.5)
MCV RBC AUTO: 86.1 FL (ref 80–99.9)
MONOCYTES NFR BLD: 0.61 K/UL (ref 0.1–0.95)
MONOCYTES NFR BLD: 6 % (ref 2–12)
NEUTROPHILS NFR BLD: 66 % (ref 43–80)
NEUTS SEG NFR BLD: 7.22 K/UL (ref 1.8–7.3)
PLATELET # BLD AUTO: 426 K/UL (ref 130–450)
PMV BLD AUTO: 9.8 FL (ref 7–12)
POTASSIUM SERPL-SCNC: 4.3 MMOL/L (ref 3.5–5)
PROT SERPL-MCNC: 7.3 G/DL (ref 6.4–8.3)
RBC # BLD AUTO: 4.81 M/UL (ref 3.5–5.5)
SODIUM SERPL-SCNC: 138 MMOL/L (ref 132–146)
TSH SERPL DL<=0.05 MIU/L-ACNC: 2.72 UIU/ML (ref 0.27–4.2)
VIT B12 SERPL-MCNC: 221 PG/ML (ref 211–946)
WBC OTHER # BLD: 10.9 K/UL (ref 4.5–11.5)

## 2025-02-24 PROCEDURE — 99213 OFFICE O/P EST LOW 20 MIN: CPT | Performed by: PODIATRIST

## 2025-02-24 PROCEDURE — 36415 COLL VENOUS BLD VENIPUNCTURE: CPT

## 2025-02-24 PROCEDURE — 85025 COMPLETE CBC W/AUTO DIFF WBC: CPT

## 2025-02-24 PROCEDURE — 84443 ASSAY THYROID STIM HORMONE: CPT

## 2025-02-24 PROCEDURE — 82306 VITAMIN D 25 HYDROXY: CPT

## 2025-02-24 PROCEDURE — 80053 COMPREHEN METABOLIC PANEL: CPT

## 2025-02-24 PROCEDURE — 82533 TOTAL CORTISOL: CPT

## 2025-02-24 PROCEDURE — 82607 VITAMIN B-12: CPT

## 2025-02-24 PROCEDURE — 83036 HEMOGLOBIN GLYCOSYLATED A1C: CPT

## 2025-02-24 PROCEDURE — 83735 ASSAY OF MAGNESIUM: CPT

## 2025-02-24 RX ORDER — SYRINGE W-NEEDLE,DISPOSAB,3 ML 25GX5/8"
1 SYRINGE, EMPTY DISPOSABLE MISCELLANEOUS
Qty: 100 EACH | Refills: 2 | Status: SHIPPED | OUTPATIENT
Start: 2025-02-24

## 2025-02-24 RX ORDER — CYANOCOBALAMIN 1000 UG/ML
1000 INJECTION, SOLUTION INTRAMUSCULAR; SUBCUTANEOUS ONCE
Qty: 1 ML | Refills: 3 | Status: SHIPPED | OUTPATIENT
Start: 2025-02-24 | End: 2025-02-24

## 2025-02-24 NOTE — PROGRESS NOTES
25     Samira Brownna    : 1995   Sex: female    Age: 29 y.o.    Patient's PCP/Provider is:  Janette Escalante DO    Subjective:  Patient is seen today for follow-up regarding PT tendon repair right lower extremity.  Overall patient is doing well with the surgical repair site.  She is getting some mild burning and cramping in the area but overall is pleased with care.  Patient's biggest concern new onset pain and swelling into her left hindfoot and ankle region.  Patient has been nonweightbearing on the right lower extremity since surgery and has started getting similar symptoms on the left.  She denies any recent injury to the left lower extremity.  No other additional abnormalities noted at this time.    Chief Complaint   Patient presents with    Post-Op Check     Post-op nontraumatic tear of tibialis posterior tendon right foot  2/       ROS:  Const: Positives and pertinent negatives as per HPI.    Musculo: Denies symptoms other than stated above.  Neuro: Denies symptoms other than stated above.  Skin: Denies symptoms other than stated above.    Current Medications:    Current Outpatient Medications:     HYDROcodone-acetaminophen (NORCO) 5-325 MG per tablet, Take 1 tablet by mouth every 6 hours as needed for Pain for up to 5 days. Max Daily Amount: 4 tablets, Disp: 20 tablet, Rfl: 0    spironolactone (ALDACTONE) 25 MG tablet, Take 1 tablet by mouth 2 times daily, Disp: 180 tablet, Rfl: 1    QUEtiapine (SEROQUEL) 50 MG tablet, Take 1 tablet by mouth at bedtime, Disp: 90 tablet, Rfl: 0    ibuprofen (ADVIL;MOTRIN) 600 MG tablet, Take 1 tablet by mouth 3 times daily as needed for Pain, Disp: 30 tablet, Rfl: 0    drospirenone-ethinyl estradiol (KELLIE) 3-0.02 MG per tablet, Take 1 tablet by mouth nightly, Disp: 1 packet, Rfl: 0    PARoxetine (PAXIL) 20 MG tablet, TAKE 1 TABLET BY MOUTH NIGHTLY, Disp: 90 tablet, Rfl: 1    albuterol sulfate HFA (VENTOLIN HFA) 108 (90 Base) MCG/ACT inhaler, Inhale 2 puffs into

## 2025-02-24 NOTE — PROGRESS NOTES
Patient here for post-op right nontraumatic tear of tibialis posterior tendon. Surgery date 2/20/2025. Patient states pain is about  2-3, occasionally she has pain that is an 8.  Janette Escalante DO last visit 2/12/2025   Electronically signed by Saray Multani LPN on 2/24/2025 at 7:29 AM

## 2025-02-26 ENCOUNTER — TRANSCRIBE ORDERS (OUTPATIENT)
Dept: SLEEP CENTER | Age: 30
End: 2025-02-26

## 2025-02-26 DIAGNOSIS — R00.0 TACHYCARDIA, UNSPECIFIED: Primary | ICD-10-CM

## 2025-03-03 ENCOUNTER — OFFICE VISIT (OUTPATIENT)
Dept: PODIATRY | Age: 30
End: 2025-03-03

## 2025-03-03 VITALS — HEIGHT: 65 IN | WEIGHT: 255 LBS | BODY MASS INDEX: 42.49 KG/M2

## 2025-03-03 DIAGNOSIS — M66.871 NONTRAUMATIC TEAR OF TIBIALIS POSTERIOR TENDON, RIGHT: Primary | ICD-10-CM

## 2025-03-03 PROCEDURE — 99024 POSTOP FOLLOW-UP VISIT: CPT | Performed by: PODIATRIST

## 2025-03-03 NOTE — PROGRESS NOTES
Patient here for post-op non traumatic tear of tibialis posterior tendon. Surgery date 2/20/2025. Patient still has sutures.  Janette Escalante DO last visit 2/12/2025   Electronically signed by Saray Multani LPN on 3/3/2025 at 9:27 AM'    
and color flow was obtained of the deep venous structures of the right extremity. COMPARISON: None. HISTORY: ORDERING SYSTEM PROVIDED HISTORY: DVT of distal lower extremity TECHNOLOGIST PROVIDED HISTORY: What reading provider will be dictating this exam?->CRC FINDINGS: The visualized veins of the right lower extremity are patent and free of echogenic thrombus. The veins demonstrate good compressibility with normal color flow study and spectral analysis.     No evidence of DVT in the right lower extremity.         Procedures:    None    Plan Per Assessment  Samira was seen today for post-op check.    Diagnoses and all orders for this visit:    Nontraumatic tear of tibialis posterior tendon, right      Postoperative evaluation and management  Surgical site evaluated right foot, dry dressing reapplied.  Patient was advised continued nonweightbearing status with Cam walker intact and knee scooter being utilized.  Patient is to refrain from driving activities at this time.  Patient will be followed up at a later date for continued evaluation and surgical site care.  They are to get in contact with us with a local therapy place close to her residence.  We will get in contact with them and send out a referral for therapy to start after next week's visit.      Seen By:    Chin Yi Jr, DPM    Electronically signed by Chin Yi Jr, DPM on 3/3/2025 at 9:49 AM    This note was created using voice recognition software.  The note was reviewed however may contain grammatical errors.

## 2025-03-04 ENCOUNTER — PATIENT MESSAGE (OUTPATIENT)
Dept: FAMILY MEDICINE CLINIC | Age: 30
End: 2025-03-04

## 2025-03-04 DIAGNOSIS — N92.6 IRREGULAR PERIODS: Primary | ICD-10-CM

## 2025-03-14 ENCOUNTER — OFFICE VISIT (OUTPATIENT)
Dept: PODIATRY | Age: 30
End: 2025-03-14

## 2025-03-14 VITALS — BODY MASS INDEX: 42.43 KG/M2 | WEIGHT: 255 LBS

## 2025-03-14 DIAGNOSIS — M66.871 NONTRAUMATIC TEAR OF TIBIALIS POSTERIOR TENDON, RIGHT: Primary | ICD-10-CM

## 2025-03-14 PROCEDURE — 99024 POSTOP FOLLOW-UP VISIT: CPT | Performed by: PODIATRIST

## 2025-03-14 NOTE — PROGRESS NOTES
3/14/25     Samira Nunez    : 1995   Sex: female    Age: 29 y.o.    Patient's PCP/Provider is:  Janette Escalante DO    Subjective:  Patient is seen today for follow-up regarding continued care regarding PT tendon repair right lower extremity.  Overall patient is doing well at this time with minimal issues noted.  Patient did start 1 therapy session and has noticed slight improvement.  No other additional abnormalities noted.  She is wearing the offloading boot as instructed.    Chief Complaint   Patient presents with    Post-Op Check     Nontraumatic tear of tibialis posterior tendon right       ROS:  Const: Positives and pertinent negatives as per HPI.    Musculo: Denies symptoms other than stated above.  Neuro: Denies symptoms other than stated above.  Skin: Denies symptoms other than stated above.    Current Medications:    Current Outpatient Medications:     norethindrone-ethinyl estradiol-Fe (LO LOESTRIN FE) 1 MG-10 MCG / 10 MCG tablet, Take 1 tablet by mouth daily, Disp: 1 packet, Rfl: 11    Syringe/Needle, Disp, (SYRINGE 3CC/25GX1\") 25G X 1\" 3 ML MISC, 1 each by Does not apply route every 30 days, Disp: 100 each, Rfl: 2    spironolactone (ALDACTONE) 25 MG tablet, Take 1 tablet by mouth 2 times daily, Disp: 180 tablet, Rfl: 1    QUEtiapine (SEROQUEL) 50 MG tablet, Take 1 tablet by mouth at bedtime, Disp: 90 tablet, Rfl: 0    ibuprofen (ADVIL;MOTRIN) 600 MG tablet, Take 1 tablet by mouth 3 times daily as needed for Pain, Disp: 30 tablet, Rfl: 0    PARoxetine (PAXIL) 20 MG tablet, TAKE 1 TABLET BY MOUTH NIGHTLY, Disp: 90 tablet, Rfl: 1    albuterol sulfate HFA (VENTOLIN HFA) 108 (90 Base) MCG/ACT inhaler, Inhale 2 puffs into the lungs every 4 hours as needed for Wheezing, Disp: 18 g, Rfl: 3    albuterol (ACCUNEB) 1.25 MG/3ML nebulizer solution, Inhale 3 mLs into the lungs every 4-6 hours as needed for Wheezing or Shortness of Breath, Disp: , Rfl:     fluticasone (FLONASE) 50 MCG/ACT nasal spray, 2

## 2025-03-14 NOTE — PROGRESS NOTES
Patient here for post-op. Patient noticed this morning when getting up she noticed her leg looked purple and had some white areas on it. Patient is having more pain today.  Janette Escalante DO   Electronically signed by Saray Multani LPN on 3/14/2025 at 7:31 AM

## 2025-03-18 DIAGNOSIS — G43.009 MIGRAINE WITHOUT AURA AND WITHOUT STATUS MIGRAINOSUS, NOT INTRACTABLE: ICD-10-CM

## 2025-03-18 RX ORDER — PAROXETINE 20 MG/1
20 TABLET, FILM COATED ORAL NIGHTLY
Qty: 90 TABLET | Refills: 1 | Status: SHIPPED | OUTPATIENT
Start: 2025-03-18

## 2025-03-18 RX ORDER — RIZATRIPTAN BENZOATE 10 MG/1
10 TABLET ORAL
Qty: 30 TABLET | Refills: 0 | Status: CANCELLED | OUTPATIENT
Start: 2025-03-18 | End: 2025-03-18

## 2025-03-18 RX ORDER — RIZATRIPTAN BENZOATE 10 MG/1
10 TABLET ORAL
Qty: 30 TABLET | Refills: 0 | Status: SHIPPED | OUTPATIENT
Start: 2025-03-18 | End: 2025-03-18

## 2025-03-18 NOTE — TELEPHONE ENCOUNTER
Name of Medication(s) Requested:  Requested Prescriptions     Pending Prescriptions Disp Refills    PARoxetine (PAXIL) 20 MG tablet 90 tablet 1     Sig: Take 1 tablet by mouth nightly    rizatriptan (MAXALT) 10 MG tablet 30 tablet 0     Sig: Take 1 tablet by mouth once as needed for Migraine May repeat in 2 hours if needed       Medication is on current medication list Yes    Dosage and directions were verified? Yes    Quantity verified: 90 day supply     Pharmacy Verified?  Yes    Last Appointment:  2/12/2025    Future appts:  Future Appointments   Date Time Provider Department Center   3/28/2025  8:30 AM Chin Yi Jr., [facility]        (If no appt send self scheduling link. .REFILLAPPT)  Scheduling request sent?     [] Yes  [x] No    Does patient need updated?  [] Yes  [x] No   Problem: Pain:  Goal: Pain level will decrease  Description  Pain level will decrease  Outcome: Ongoing  Note:   Pain level will decrease throughout admission. Pt will continue to rate pain by using pain scale 0-10. Pts current pain rating is 0. Will continue to monitor and use appropriate pain relieving measures      Problem: OXYGENATION/RESPIRATORY FUNCTION  Goal: Patient will maintain patent airway  Outcome: Ongoing  Note:     Patient's EF (Ejection Fraction) is greater than 40%    Patient's weights and intake/output reviewed:    Patient's Last Weight: 175 lbs obtained by standing scale. Difference of 0 lbs more than last documented weight. Intake/Output Summary (Last 24 hours) at 11/4/2019 1946  Last data filed at 11/4/2019 1800  Gross per 24 hour   Intake 990 ml   Output 1890 ml   Net -900 ml         Patient stated Daily Functional Goal: Wants to understand heart failure, learn about ways to reduce edema and be titrated back to baseline oxygen level of 2l. Ongoing Functional Capacity Assessment:  Patient  able to ambulate with mild difficulty. Patient noted to be on 4L supplemental O2 with SpO2 of 94%. Pt up in chair at this time on 4L. Pt with complaints of shortness of breath. Pt with pitting lower extremity edema. Patient and/or Family's stated Goal of Care this Admission: reduce shortness of breath, better understand heart failure and disease management and be more comfortable prior to discharge      Comorbidities Reviewed Yes  Patient has a past medical history of Abnormal stress test, CAD (coronary artery disease), GERD (gastroesophageal reflux disease), Hx of blood clots, and Hyperlipidemia.          >>For CHF and Comorbidity documentation on Education Time and Topics, please see Education Tab

## 2025-03-31 ENCOUNTER — TELEPHONE (OUTPATIENT)
Dept: ADMINISTRATIVE | Age: 30
End: 2025-03-31

## 2025-04-01 ENCOUNTER — PATIENT MESSAGE (OUTPATIENT)
Dept: FAMILY MEDICINE CLINIC | Age: 30
End: 2025-04-01

## 2025-04-03 ENCOUNTER — PATIENT MESSAGE (OUTPATIENT)
Dept: FAMILY MEDICINE CLINIC | Age: 30
End: 2025-04-03

## 2025-04-04 ENCOUNTER — OFFICE VISIT (OUTPATIENT)
Dept: PODIATRY | Age: 30
End: 2025-04-04

## 2025-04-04 VITALS — BODY MASS INDEX: 42.49 KG/M2 | HEIGHT: 65 IN | WEIGHT: 255 LBS

## 2025-04-04 DIAGNOSIS — M66.871 NONTRAUMATIC TEAR OF TIBIALIS POSTERIOR TENDON, RIGHT: Primary | ICD-10-CM

## 2025-04-04 PROCEDURE — 1073RET COMPLETION OF WORKABILITY PRESCRIPTION: Performed by: PODIATRIST

## 2025-04-04 PROCEDURE — 99024 POSTOP FOLLOW-UP VISIT: CPT | Performed by: PODIATRIST

## 2025-04-04 NOTE — PROGRESS NOTES
25     Samira Nunez    : 1995   Sex: female    Age: 29 y.o.    Patient's PCP/Provider is:  Janette Escalante DO    Subjective:  Patient is seen today for follow-up regarding continued care regarding postoperative management PT tendon repair right lower extremity.  Overall patient is doing well at this time.  She has attended a few of the therapy sessions but is performing a lot of the at home stretching and strengthening exercises.  Patient is very pleased with surgery and outcome at this time.    Chief Complaint   Patient presents with    Post-Op Check     Right ankle        ROS:  Const: Positives and pertinent negatives as per HPI.    Musculo: Denies symptoms other than stated above.  Neuro: Denies symptoms other than stated above.  Skin: Denies symptoms other than stated above.    Current Medications:    Current Outpatient Medications:     PARoxetine (PAXIL) 20 MG tablet, Take 1 tablet by mouth nightly, Disp: 90 tablet, Rfl: 1    norethindrone-ethinyl estradiol-Fe (LO LOESTRIN FE) 1 MG-10 MCG / 10 MCG tablet, Take 1 tablet by mouth daily, Disp: 1 packet, Rfl: 11    Syringe/Needle, Disp, (SYRINGE 3CC/25GX1\") 25G X 1\" 3 ML MISC, 1 each by Does not apply route every 30 days, Disp: 100 each, Rfl: 2    spironolactone (ALDACTONE) 25 MG tablet, Take 1 tablet by mouth 2 times daily, Disp: 180 tablet, Rfl: 1    QUEtiapine (SEROQUEL) 50 MG tablet, Take 1 tablet by mouth at bedtime, Disp: 90 tablet, Rfl: 0    ibuprofen (ADVIL;MOTRIN) 600 MG tablet, Take 1 tablet by mouth 3 times daily as needed for Pain, Disp: 30 tablet, Rfl: 0    albuterol sulfate HFA (VENTOLIN HFA) 108 (90 Base) MCG/ACT inhaler, Inhale 2 puffs into the lungs every 4 hours as needed for Wheezing, Disp: 18 g, Rfl: 3    albuterol (ACCUNEB) 1.25 MG/3ML nebulizer solution, Inhale 3 mLs into the lungs every 4-6 hours as needed for Wheezing or Shortness of Breath, Disp: , Rfl:     fluticasone (FLONASE) 50 MCG/ACT nasal spray, 2 sprays by Each

## 2025-04-04 NOTE — PROGRESS NOTES
Patient is in today for 2 week post op of right ankle. Patient says she has been able to walk around without the crutches and not having much burning sensation as before, but does notice swelling and soreness. Pcp is Janette Escalante DO  Last ov 2/12/25

## 2025-04-15 ENCOUNTER — HOSPITAL ENCOUNTER (OUTPATIENT)
Dept: ULTRASOUND IMAGING | Age: 30
Discharge: HOME | End: 2025-04-17
Payer: MEDICAID

## 2025-04-15 ENCOUNTER — HOSPITAL ENCOUNTER (OUTPATIENT)
Age: 30
Discharge: HOME | End: 2025-04-15
Payer: MEDICAID

## 2025-04-15 DIAGNOSIS — N92.6 IRREGULAR PERIODS: ICD-10-CM

## 2025-04-15 DIAGNOSIS — R79.89 ELEVATED CORTISOL LEVEL: ICD-10-CM

## 2025-04-15 LAB
CORTISOL (UR), FREE: NORMAL
CORTISOL URINE, FREE (/G CRT): NORMAL
CORTISOL, URINE RATIO CREATININE: NORMAL
CORTISOL, URINE: NORMAL
CREAT 24H UR-MCNC: NORMAL MG/DL
CREATININE URINE /24 HR: NORMAL
SPECIMEN VOL ?TM UR: NORMAL ML

## 2025-04-15 PROCEDURE — 76830 TRANSVAGINAL US NON-OB: CPT

## 2025-04-15 PROCEDURE — 82530 CORTISOL FREE: CPT

## 2025-04-16 ENCOUNTER — RESULTS FOLLOW-UP (OUTPATIENT)
Dept: FAMILY MEDICINE CLINIC | Age: 30
End: 2025-04-16

## 2025-04-21 ENCOUNTER — RESULTS FOLLOW-UP (OUTPATIENT)
Dept: FAMILY MEDICINE CLINIC | Age: 30
End: 2025-04-21

## 2025-04-21 LAB
CORTISOL (UR), FREE: NORMAL UG/D
CORTISOL URINE, FREE (/G CRT): 22.5 UG/L
CORTISOL, URINE RATIO CREATININE: 25.57 UG/G CRT
CORTISOL, URINE: NORMAL
CREAT 24H UR-MCNC: 88 MG/DL
CREATININE URINE /24 HR: NORMAL MG/D (ref 700–1600)
SPECIMEN VOL ?TM UR: 40 ML

## 2025-05-10 DIAGNOSIS — G47.00 INSOMNIA, UNSPECIFIED TYPE: ICD-10-CM

## 2025-05-12 RX ORDER — QUETIAPINE FUMARATE 50 MG/1
50 TABLET, FILM COATED ORAL NIGHTLY
Qty: 90 TABLET | Refills: 0 | Status: SHIPPED | OUTPATIENT
Start: 2025-05-12

## 2025-05-12 NOTE — TELEPHONE ENCOUNTER
Name of Medication(s) Requested:  Requested Prescriptions     Pending Prescriptions Disp Refills    QUEtiapine (SEROQUEL) 50 MG tablet [Pharmacy Med Name: QUEtiapine Fumarate Oral Tablet 50 MG] 90 tablet 0     Sig: TAKE ONE TABLET BY MOUTH AT BEDTIME       Medication is on current medication list Yes    Dosage and directions were verified? Yes    Quantity verified: 90 day supply     Pharmacy Verified?  Yes    Last Appointment:  2/12/2025    Future appts:  Future Appointments   Date Time Provider Department Center   5/23/2025 10:00 AM Sarah Pham MD [facility]        (If no appt send self scheduling link. .REFILLAPPT)  Scheduling request sent?     [] Yes  [x] No    Does patient need updated?  [] Yes  [x] No

## 2025-05-17 ENCOUNTER — PATIENT MESSAGE (OUTPATIENT)
Dept: FAMILY MEDICINE CLINIC | Age: 30
End: 2025-05-17

## 2025-05-17 DIAGNOSIS — J45.909 UNCOMPLICATED ASTHMA, UNSPECIFIED ASTHMA SEVERITY, UNSPECIFIED WHETHER PERSISTENT: ICD-10-CM

## 2025-05-17 DIAGNOSIS — Z91.09 ENVIRONMENTAL ALLERGIES: ICD-10-CM

## 2025-05-19 DIAGNOSIS — Z91.030 BEE ALLERGY STATUS: Primary | ICD-10-CM

## 2025-05-19 RX ORDER — FLUTICASONE PROPIONATE 50 MCG
2 SPRAY, SUSPENSION (ML) NASAL DAILY PRN
Qty: 16 G | Refills: 2 | Status: SHIPPED | OUTPATIENT
Start: 2025-05-19

## 2025-05-19 RX ORDER — EPINEPHRINE 0.3 MG/.3ML
0.3 INJECTION SUBCUTANEOUS ONCE
Qty: 0.3 ML | Refills: 0 | Status: SHIPPED | OUTPATIENT
Start: 2025-05-19 | End: 2025-05-19

## 2025-05-19 RX ORDER — MONTELUKAST SODIUM 10 MG/1
10 TABLET ORAL NIGHTLY
Qty: 90 TABLET | Refills: 3 | Status: SHIPPED | OUTPATIENT
Start: 2025-05-19

## 2025-05-19 RX ORDER — ALBUTEROL SULFATE 90 UG/1
2 INHALANT RESPIRATORY (INHALATION) EVERY 4 HOURS PRN
Qty: 18 G | Refills: 3 | Status: SHIPPED | OUTPATIENT
Start: 2025-05-19

## 2025-05-19 NOTE — TELEPHONE ENCOUNTER
Name of Medication(s) Requested:  Requested Prescriptions     Pending Prescriptions Disp Refills    albuterol sulfate HFA (VENTOLIN HFA) 108 (90 Base) MCG/ACT inhaler 18 g 3     Sig: Inhale 2 puffs into the lungs every 4 hours as needed for Wheezing       Medication is on current medication list Yes    Dosage and directions were verified? Yes    Quantity verified: 30 day supply     Pharmacy Verified?  Yes    Last Appointment:  2/12/2025    Future appts:  Future Appointments   Date Time Provider Department Center   5/23/2025 10:00 AM Sarah Pham MD [facility]        (If no appt send self scheduling link. .REFILLAPPT)  Scheduling request sent?     [] Yes  [x] No    Does patient need updated?  [] Yes  [x] No

## 2025-06-10 ENCOUNTER — LAB (OUTPATIENT)
Dept: FAMILY MEDICINE CLINIC | Age: 30
End: 2025-06-10
Payer: MEDICAID

## 2025-06-10 VITALS — SYSTOLIC BLOOD PRESSURE: 136 MMHG | DIASTOLIC BLOOD PRESSURE: 80 MMHG

## 2025-06-10 DIAGNOSIS — R03.0 ELEVATED BLOOD PRESSURE READING WITHOUT DIAGNOSIS OF HYPERTENSION: Primary | ICD-10-CM

## 2025-06-10 PROCEDURE — 99211 OFF/OP EST MAY X REQ PHY/QHP: CPT | Performed by: STUDENT IN AN ORGANIZED HEALTH CARE EDUCATION/TRAINING PROGRAM

## 2025-07-08 ENCOUNTER — PATIENT MESSAGE (OUTPATIENT)
Dept: FAMILY MEDICINE CLINIC | Age: 30
End: 2025-07-08

## 2025-07-08 DIAGNOSIS — R73.03 PREDIABETES: Primary | ICD-10-CM

## 2025-07-08 DIAGNOSIS — E28.2 PCOS (POLYCYSTIC OVARIAN SYNDROME): ICD-10-CM

## 2025-07-08 NOTE — TELEPHONE ENCOUNTER
I am not sure why I cannot see the ozempic in the chart, I need to know which dose she is on now so I can increase

## 2025-07-13 ENCOUNTER — PATIENT MESSAGE (OUTPATIENT)
Dept: PODIATRY | Age: 30
End: 2025-07-13

## 2025-07-18 ENCOUNTER — OFFICE VISIT (OUTPATIENT)
Dept: FAMILY MEDICINE CLINIC | Age: 30
End: 2025-07-18

## 2025-07-18 VITALS
HEIGHT: 65 IN | WEIGHT: 258 LBS | SYSTOLIC BLOOD PRESSURE: 122 MMHG | BODY MASS INDEX: 42.99 KG/M2 | RESPIRATION RATE: 16 BRPM | DIASTOLIC BLOOD PRESSURE: 82 MMHG | HEART RATE: 93 BPM | TEMPERATURE: 97.8 F | OXYGEN SATURATION: 99 %

## 2025-07-18 DIAGNOSIS — E28.2 PCOS (POLYCYSTIC OVARIAN SYNDROME): ICD-10-CM

## 2025-07-18 DIAGNOSIS — R73.03 PREDIABETES: Primary | ICD-10-CM

## 2025-07-18 DIAGNOSIS — R39.9 URINARY TRACT INFECTION SYMPTOMS: ICD-10-CM

## 2025-07-18 DIAGNOSIS — G43.009 MIGRAINE WITHOUT AURA AND WITHOUT STATUS MIGRAINOSUS, NOT INTRACTABLE: ICD-10-CM

## 2025-07-18 DIAGNOSIS — R53.82 CHRONIC FATIGUE: ICD-10-CM

## 2025-07-18 DIAGNOSIS — K59.00 CONSTIPATION, UNSPECIFIED CONSTIPATION TYPE: ICD-10-CM

## 2025-07-18 DIAGNOSIS — M25.511 CHRONIC PAIN OF BOTH SHOULDERS: ICD-10-CM

## 2025-07-18 DIAGNOSIS — G89.29 CHRONIC RIGHT SHOULDER PAIN: ICD-10-CM

## 2025-07-18 DIAGNOSIS — R10.84 GENERALIZED ABDOMINAL PAIN: ICD-10-CM

## 2025-07-18 DIAGNOSIS — E78.2 ELEVATED TRIGLYCERIDES WITH HIGH CHOLESTEROL: ICD-10-CM

## 2025-07-18 DIAGNOSIS — E55.9 VITAMIN D DEFICIENCY: ICD-10-CM

## 2025-07-18 DIAGNOSIS — G89.29 CHRONIC PAIN OF BOTH SHOULDERS: ICD-10-CM

## 2025-07-18 DIAGNOSIS — M25.511 CHRONIC RIGHT SHOULDER PAIN: ICD-10-CM

## 2025-07-18 DIAGNOSIS — R19.7 DIARRHEA, UNSPECIFIED TYPE: ICD-10-CM

## 2025-07-18 DIAGNOSIS — M25.512 CHRONIC PAIN OF BOTH SHOULDERS: ICD-10-CM

## 2025-07-18 LAB
ALBUMIN: 4 G/DL (ref 3.5–5.2)
ALP BLD-CCNC: 79 U/L (ref 35–104)
ALT SERPL-CCNC: 13 U/L (ref 0–35)
ANION GAP SERPL CALCULATED.3IONS-SCNC: 14 MMOL/L (ref 7–16)
AST SERPL-CCNC: 18 U/L (ref 0–35)
BASOPHILS ABSOLUTE: 0.04 K/UL (ref 0–0.2)
BASOPHILS RELATIVE PERCENT: 1 % (ref 0–2)
BILIRUB SERPL-MCNC: <0.2 MG/DL (ref 0–1.2)
BUN BLDV-MCNC: 13 MG/DL (ref 6–20)
CALCIUM SERPL-MCNC: 9.3 MG/DL (ref 8.6–10)
CHLORIDE BLD-SCNC: 105 MMOL/L (ref 98–107)
CHOLESTEROL, TOTAL: 163 MG/DL
CO2: 20 MMOL/L (ref 22–29)
CREAT SERPL-MCNC: 0.8 MG/DL (ref 0.5–1)
EOSINOPHILS ABSOLUTE: 0.07 K/UL (ref 0.05–0.5)
EOSINOPHILS RELATIVE PERCENT: 1 % (ref 0–6)
GFR, ESTIMATED: >90 ML/MIN/1.73M2
GLUCOSE BLD-MCNC: 130 MG/DL (ref 74–99)
HBA1C MFR BLD: 6.2 % (ref 4–5.6)
HCT VFR BLD CALC: 43.2 % (ref 34–48)
HDLC SERPL-MCNC: 58 MG/DL
HEMOGLOBIN: 13.8 G/DL (ref 11.5–15.5)
IMMATURE GRANULOCYTES %: 0 % (ref 0–5)
IMMATURE GRANULOCYTES ABSOLUTE: <0.03 K/UL (ref 0–0.58)
LDL CHOLESTEROL: 77 MG/DL
LYMPHOCYTES ABSOLUTE: 2.38 K/UL (ref 1.5–4)
LYMPHOCYTES RELATIVE PERCENT: 30 % (ref 20–42)
MCH RBC QN AUTO: 28.1 PG (ref 26–35)
MCHC RBC AUTO-ENTMCNC: 31.9 G/DL (ref 32–34.5)
MCV RBC AUTO: 88 FL (ref 80–99.9)
MONOCYTES ABSOLUTE: 0.5 K/UL (ref 0.1–0.95)
MONOCYTES RELATIVE PERCENT: 6 % (ref 2–12)
NEUTROPHILS ABSOLUTE: 5.07 K/UL (ref 1.8–7.3)
NEUTROPHILS RELATIVE PERCENT: 63 % (ref 43–80)
PDW BLD-RTO: 13.5 % (ref 11.5–15)
PLATELET # BLD: 404 K/UL (ref 130–450)
PMV BLD AUTO: 10.7 FL (ref 7–12)
POTASSIUM SERPL-SCNC: 4.6 MMOL/L (ref 3.5–5.1)
RBC # BLD: 4.91 M/UL (ref 3.5–5.5)
SODIUM BLD-SCNC: 138 MMOL/L (ref 136–145)
TOTAL PROTEIN: 7.2 G/DL (ref 6.4–8.3)
TRIGL SERPL-MCNC: 142 MG/DL
TSH SERPL DL<=0.05 MIU/L-ACNC: 2.73 UIU/ML (ref 0.27–4.2)
VITAMIN D 25-HYDROXY: 40.2 NG/ML (ref 30–100)
VLDLC SERPL CALC-MCNC: 28 MG/DL
WBC # BLD: 8.1 K/UL (ref 4.5–11.5)

## 2025-07-18 PROCEDURE — 1073RET COMPLETION OF WORKABILITY PRESCRIPTION: Performed by: STUDENT IN AN ORGANIZED HEALTH CARE EDUCATION/TRAINING PROGRAM

## 2025-07-18 RX ORDER — METHOCARBAMOL 750 MG/1
750 TABLET, FILM COATED ORAL NIGHTLY PRN
Qty: 90 TABLET | Refills: 0 | Status: SHIPPED | OUTPATIENT
Start: 2025-07-18 | End: 2025-10-16

## 2025-07-18 RX ORDER — TRIAMCINOLONE ACETONIDE 40 MG/ML
40 INJECTION, SUSPENSION INTRA-ARTICULAR; INTRAMUSCULAR ONCE
Status: COMPLETED | OUTPATIENT
Start: 2025-07-18 | End: 2025-07-18

## 2025-07-18 RX ORDER — DOCUSATE SODIUM 100 MG/1
100 CAPSULE, LIQUID FILLED ORAL 2 TIMES DAILY
Qty: 60 CAPSULE | Refills: 2 | Status: SHIPPED | OUTPATIENT
Start: 2025-07-18 | End: 2025-10-16

## 2025-07-18 RX ORDER — KETOROLAC TROMETHAMINE 30 MG/ML
30 INJECTION, SOLUTION INTRAMUSCULAR; INTRAVENOUS ONCE
Status: COMPLETED | OUTPATIENT
Start: 2025-07-18 | End: 2025-07-18

## 2025-07-18 RX ORDER — RIZATRIPTAN BENZOATE 10 MG/1
10 TABLET ORAL
Qty: 30 TABLET | Refills: 0 | Status: SHIPPED | OUTPATIENT
Start: 2025-07-18

## 2025-07-18 RX ORDER — DROSPIRENONE AND ETHINYL ESTRADIOL 0.02-3(28)
1 KIT ORAL DAILY
COMMUNITY
Start: 2025-05-14

## 2025-07-18 RX ORDER — TAMSULOSIN HYDROCHLORIDE 0.4 MG/1
0.4 CAPSULE ORAL DAILY PRN
Qty: 90 CAPSULE | Refills: 1 | Status: SHIPPED | OUTPATIENT
Start: 2025-07-18 | End: 2026-01-14

## 2025-07-18 RX ADMIN — TRIAMCINOLONE ACETONIDE 40 MG: 40 INJECTION, SUSPENSION INTRA-ARTICULAR; INTRAMUSCULAR at 09:29

## 2025-07-18 RX ADMIN — KETOROLAC TROMETHAMINE 30 MG: 30 INJECTION, SOLUTION INTRAMUSCULAR; INTRAVENOUS at 09:30

## 2025-07-18 ASSESSMENT — ENCOUNTER SYMPTOMS
COUGH: 0
SINUS PAIN: 0
NAUSEA: 0
VOMITING: 0
ABDOMINAL PAIN: 1
SINUS PRESSURE: 0
SORE THROAT: 0
ABDOMINAL DISTENTION: 1
EYE REDNESS: 0
CONSTIPATION: 1
RHINORRHEA: 0
SHORTNESS OF BREATH: 0
EYE PAIN: 0
DIARRHEA: 0

## 2025-07-18 NOTE — PROGRESS NOTES
serious comorbidity and body mass index (BMI) of 50.0 to 59.9 in adult (HCC)     COVID-19 12/01/2023    mild    Elevated liver transaminase level 09/17/2021    Kidney stone     Migraine without aura 09/17/2021    PCOS (polycystic ovarian syndrome)     Sleep apnea     non compliant with cpap       Past Surgical History:   Procedure Laterality Date    ANKLE SURGERY Right 2/20/2025    Repair Posterior Tibial Tendon Right Lower Extremity with Grafting and PRP Augmentation performed by Chin Yi Jr., DPM at [facility] OR    CHOLECYSTECTOMY, LAPAROSCOPIC N/A 2/29/2024    LAPAROSCOPIC ROBOTIC XI ASSISTED CHOLECYSTECTOMY WITH INDOCYANINE GREEN CHOLANGIOGRAM performed by Trav Zaidi DO at [facility] OR    UPPER GASTROINTESTINAL ENDOSCOPY N/A 2/2/2024    EGD ESOPHAGOGASTRODUODENOSCOPY performed by Trav Zaidi DO at [facility] ENDOSCOPY    UPPER GASTROINTESTINAL ENDOSCOPY N/A 2/28/2024    EGD ESOPHAGOGASTRODUODENOSCOPY performed by Trav Zaidi DO at [facility] ENDOSCOPY       Social History     Socioeconomic History    Marital status: Single     Spouse name: Not on file    Number of children: Not on file    Years of education: Not on file    Highest education level: Not on file   Occupational History    Not on file   Tobacco Use    Smoking status: Never     Passive exposure: Past    Smokeless tobacco: Never   Vaping Use    Vaping status: Never Used   Substance and Sexual Activity    Alcohol use: Not Currently     Comment: occas.    Drug use: Never    Sexual activity: Yes     Partners: Female   Other Topics Concern    Not on file   Social History Narrative    Not on file     Social Drivers of Health     Financial Resource Strain: Low Risk  (1/31/2024)    Overall Financial Resource Strain (CARDIA)     Difficulty of Paying Living Expenses: Not hard at all   Food Insecurity: No Food Insecurity (2/12/2025)    Hunger Vital Sign     Worried About Running Out of Food in the Last Year: Never true     Ran Out of Food in the

## 2025-07-20 LAB
ALLERGEN BARLEY IGE: NORMAL KU/L (ref 0–0.34)
ALLERGEN BEEF: NORMAL KU/L (ref 0–0.34)
ALLERGEN CABBAGE IGE: NORMAL KU/L (ref 0–0.34)
ALLERGEN CARROT IGE: NORMAL KU/L (ref 0–0.34)
ALLERGEN CHICKEN IGE: NORMAL KU/L (ref 0–0.34)
ALLERGEN CODFISH IGE: NORMAL KU/L (ref 0–0.34)
ALLERGEN CORN IGE: NORMAL KU/L (ref 0–0.34)
ALLERGEN COW MILK IGE: NORMAL KU/L (ref 0–0.34)
ALLERGEN CRAB IGE: NORMAL KU/L (ref 0–0.34)
ALLERGEN EGG WHITE IGE: NORMAL KU/L (ref 0–0.34)
ALLERGEN GRAPE IGE: NORMAL KU/L (ref 0–0.34)
ALLERGEN LETTUCE IGE: NORMAL KU/L (ref 0–0.34)
ALLERGEN NAVY BEAN: NORMAL KU/L (ref 0–0.34)
ALLERGEN OAT: NORMAL KU/L (ref 0–0.34)
ALLERGEN ORANGE IGE: NORMAL KU/L (ref 0–0.34)
ALLERGEN PEANUT (F13) IGE: NORMAL KU/L (ref 0–0.34)
ALLERGEN PEPPER C. ANNUUM IGE: NORMAL KU/L (ref 0–0.34)
ALLERGEN PORK: NORMAL KU/L (ref 0–0.34)
ALLERGEN RICE IGE: NORMAL KU/L (ref 0–0.34)
ALLERGEN RYE IGE: NORMAL KU/L (ref 0–0.34)
ALLERGEN SOYBEAN IGE: NORMAL KU/L (ref 0–0.34)
ALLERGEN TOMATO IGE: NORMAL KU/L (ref 0–0.34)
ALLERGEN TUNA IGE: NORMAL KU/L (ref 0–0.34)
ALLERGEN WHEAT IGE: NORMAL KU/L (ref 0–0.34)
IGE: 4 IU/ML (ref 0–100)
POTATO, IGE: NORMAL KU/L (ref 0–0.34)
SHRIMP: NORMAL KU/L (ref 0–0.34)

## 2025-07-21 LAB
ALLERGEN BARLEY IGE: <0.1 KU/L (ref 0–0.34)
ALLERGEN BEEF: <0.1 KU/L (ref 0–0.34)
ALLERGEN CABBAGE IGE: <0.1 KU/L (ref 0–0.34)
ALLERGEN CARROT IGE: <0.1 KU/L (ref 0–0.34)
ALLERGEN CHICKEN IGE: NORMAL KU/L (ref 0–0.34)
ALLERGEN CODFISH IGE: <0.1 KU/L (ref 0–0.34)
ALLERGEN CORN IGE: NORMAL KU/L (ref 0–0.34)
ALLERGEN COW MILK IGE: <0.1 KU/L (ref 0–0.34)
ALLERGEN CRAB IGE: <0.1 KU/L (ref 0–0.34)
ALLERGEN EGG WHITE IGE: <0.1 KU/L (ref 0–0.34)
ALLERGEN GRAPE IGE: <0.1 KU/L (ref 0–0.34)
ALLERGEN LETTUCE IGE: <0.1 KU/L (ref 0–0.34)
ALLERGEN NAVY BEAN: <0.1 KU/L (ref 0–0.34)
ALLERGEN OAT: NORMAL KU/L (ref 0–0.34)
ALLERGEN ORANGE IGE: <0.1 KU/L (ref 0–0.34)
ALLERGEN PEANUT (F13) IGE: <0.1 KU/L (ref 0–0.34)
ALLERGEN PEPPER C. ANNUUM IGE: <0.1 KU/L (ref 0–0.34)
ALLERGEN PORK: <0.1 KU/L (ref 0–0.34)
ALLERGEN RICE IGE: NORMAL KU/L (ref 0–0.34)
ALLERGEN RYE IGE: <0.1 KU/L (ref 0–0.34)
ALLERGEN SOYBEAN IGE: <0.1 KU/L (ref 0–0.34)
ALLERGEN TOMATO IGE: <0.1 KU/L (ref 0–0.34)
ALLERGEN TUNA IGE: <0.1 KU/L (ref 0–0.34)
ALLERGEN WHEAT IGE: <0.1 KU/L (ref 0–0.34)
GLIADIN ANTIBODIES IGA: <0.72 FLU (ref 0–4.99)
IGE: 4 IU/ML (ref 0–100)
POTATO, IGE: <0.1 KU/L (ref 0–0.34)
SHRIMP: <0.1 KU/L (ref 0–0.34)
TRANSGLUTAMINASE IGA: <1.02 FLU (ref 0–4.99)

## 2025-08-01 DIAGNOSIS — M79.671 FOOT PAIN, RIGHT: ICD-10-CM

## 2025-08-01 DIAGNOSIS — M79.672 FOOT PAIN, LEFT: Primary | ICD-10-CM

## 2025-08-04 ENCOUNTER — OFFICE VISIT (OUTPATIENT)
Dept: PODIATRY | Age: 30
End: 2025-08-04
Payer: MEDICAID

## 2025-08-04 VITALS
DIASTOLIC BLOOD PRESSURE: 86 MMHG | HEART RATE: 85 BPM | SYSTOLIC BLOOD PRESSURE: 120 MMHG | TEMPERATURE: 97.5 F | BODY MASS INDEX: 42.93 KG/M2 | WEIGHT: 258 LBS

## 2025-08-04 DIAGNOSIS — M76.821 POSTERIOR TIBIALIS TENDINITIS OF BOTH LOWER EXTREMITIES: ICD-10-CM

## 2025-08-04 DIAGNOSIS — M76.822 POSTERIOR TIBIALIS TENDINITIS OF BOTH LOWER EXTREMITIES: ICD-10-CM

## 2025-08-04 DIAGNOSIS — R26.2 DIFFICULTY WALKING: ICD-10-CM

## 2025-08-04 DIAGNOSIS — Q66.6 PES VALGUS: Primary | ICD-10-CM

## 2025-08-04 PROCEDURE — 99214 OFFICE O/P EST MOD 30 MIN: CPT | Performed by: PODIATRIST

## 2025-08-04 PROCEDURE — 1073RET COMPLETION OF WORKABILITY PRESCRIPTION: Performed by: PODIATRIST

## 2025-08-04 RX ORDER — CELECOXIB 200 MG/1
200 CAPSULE ORAL DAILY
Qty: 30 CAPSULE | Refills: 0 | Status: SHIPPED | OUTPATIENT
Start: 2025-08-04 | End: 2025-09-03

## 2025-08-21 ENCOUNTER — TELEPHONE (OUTPATIENT)
Dept: PODIATRY | Age: 30
End: 2025-08-21

## 2025-08-21 DIAGNOSIS — Q66.6 PES VALGUS: Primary | ICD-10-CM

## 2025-08-21 DIAGNOSIS — M76.821 POSTERIOR TIBIALIS TENDINITIS OF BOTH LOWER EXTREMITIES: ICD-10-CM

## 2025-08-21 DIAGNOSIS — M76.822 POSTERIOR TIBIALIS TENDINITIS OF BOTH LOWER EXTREMITIES: ICD-10-CM

## 2025-08-29 DIAGNOSIS — E28.2 PCOS (POLYCYSTIC OVARIAN SYNDROME): ICD-10-CM

## 2025-08-29 DIAGNOSIS — G47.00 INSOMNIA, UNSPECIFIED TYPE: ICD-10-CM

## 2025-09-03 RX ORDER — QUETIAPINE FUMARATE 50 MG/1
50 TABLET, FILM COATED ORAL NIGHTLY
Qty: 90 TABLET | Refills: 0 | Status: SHIPPED | OUTPATIENT
Start: 2025-09-03

## 2025-09-03 RX ORDER — SPIRONOLACTONE 25 MG/1
25 TABLET ORAL 2 TIMES DAILY
Qty: 180 TABLET | Refills: 0 | Status: SHIPPED | OUTPATIENT
Start: 2025-09-03

## (undated) DEVICE — BLOCK BITE 60FR RUBBER ADLT DENTAL

## (undated) DEVICE — BANDAGE,GAUZE,4.5"X4.1YD,STERILE,LF: Brand: MEDLINE

## (undated) DEVICE — PERMANENT CAUTERY HOOK: Brand: ENDOWRIST

## (undated) DEVICE — FENESTRATED BIPOLAR FORCEPS: Brand: ENDOWRIST

## (undated) DEVICE — NEEDLE,22GX1.5",REG,BEVEL: Brand: MEDLINE

## (undated) DEVICE — FORCEPS BX OVL CUP FEN DISPOSABLE CAP L 160CM RAD JAW 4

## (undated) DEVICE — ELECTRODE PT RET AD L9FT HI MOIST COND ADH HYDRGEL CORDED

## (undated) DEVICE — SYSTEM PRP DBL SYR W/ CAP FOR AUTOLGS PLSM SYS ACP

## (undated) DEVICE — SEAL

## (undated) DEVICE — SUTURE ETHILON SZ 4-0 L18IN NONABSORBABLE BLK L13MM P-3 3/8 699G

## (undated) DEVICE — PACK PROCEDURE SURG GEN CUST

## (undated) DEVICE — SPONGE GZ W4XL4IN RAYON POLY CVR W/NONWOVEN FAB STRL 2/PK

## (undated) DEVICE — CAMERA STRYKER 1488

## (undated) DEVICE — PACK SURG BASIC I LF

## (undated) DEVICE — DRAPE,LAP,CHOLE,W/TROUGHS,STERILE: Brand: MEDLINE

## (undated) DEVICE — INSUFFLATION TUBING SET WITH FILTER, FUNNEL CONNECTOR AND LUER LOCK: Brand: JOSNOE MEDICAL INC

## (undated) DEVICE — TOWEL,OR,DSP,ST,BLUE,STD,6/PK,12PK/CS: Brand: MEDLINE

## (undated) DEVICE — COLUMN DRAPE

## (undated) DEVICE — ROUND TIP SCISSORS: Brand: ENDOWRIST

## (undated) DEVICE — GOWN,SIRUS,FABRNF,L,20/CS: Brand: MEDLINE

## (undated) DEVICE — TUBING, SUCTION, 3/16" X 10', STRAIGHT: Brand: MEDLINE

## (undated) DEVICE — SYRINGE,EAR/ULCER, 2 OZ, STERILE: Brand: MEDLINE

## (undated) DEVICE — LIQUIBAND RAPID ADHESIVE 36/CS 0.8ML: Brand: MEDLINE

## (undated) DEVICE — ARM DRAPE

## (undated) DEVICE — GOWN,SIRUS,FABRNF,XL,20/CS: Brand: MEDLINE

## (undated) DEVICE — 4-PORT MANIFOLD: Brand: NEPTUNE 2

## (undated) DEVICE — DRESSING PETRO W3XL8IN OIL EMUL N ADH GZ KNIT IMPREG CELOS

## (undated) DEVICE — DOUBLE BASIN SET: Brand: MEDLINE INDUSTRIES, INC.

## (undated) DEVICE — GLOVE SURG SZ 75 L12IN FNGR THK79MIL GRN LTX FREE

## (undated) DEVICE — INSUFFLATION NEEDLE TO ESTABLISH PNEUMOPERITONEUM.: Brand: INSUFFLATION NEEDLE

## (undated) DEVICE — KIT,ANTI FOG,W/SPONGE & FLUID,SOFT PACK: Brand: MEDLINE

## (undated) DEVICE — NEEDLE SPNL L3.5IN PNK HUB S STL REG WALL FIT STYL W/ QNCKE

## (undated) DEVICE — COUNTER NDL 10 COUNT HLD 20 FOAM BLK SGL MAG

## (undated) DEVICE — ANCHOR TISSUE RETRIEVAL SYSTEM, BAG SIZE 125 ML, PORT SIZE 8 MM: Brand: ANCHOR TISSUE RETRIEVAL SYSTEM

## (undated) DEVICE — GRADUATE TRIANG MEASURE 1000ML BLK PRNT

## (undated) DEVICE — WARMER SCP 2 ANTIFOG LAP DISP

## (undated) DEVICE — TRAY 0 DEG STRYKER 5MM/10MM LENS REUSABLE

## (undated) DEVICE — GLOVE SURG SZ 85 L12IN FNGR THK94MIL STD WHT LTX FREE

## (undated) DEVICE — SYRINGE 20ML LL S/C 50

## (undated) DEVICE — BANDAGE,GAUZE,CONFORMING,3"X75",STRL,LF: Brand: MEDLINE

## (undated) DEVICE — SUTURE VICRYL COAT SZ 4-0 L18IN ABSRB UD L19MM PS-2 1/2 CIR J496G

## (undated) DEVICE — SOLUTION IRRIG 1000ML 0.9% SOD CHL USP POUR PLAS BTL

## (undated) DEVICE — GLOVE,SURG,SENSICARE,ALOE,LF,PF,7: Brand: MEDLINE

## (undated) DEVICE — GAUZE,SPONGE,4"X4",16PLY,XRAY,STRL,LF: Brand: MEDLINE

## (undated) DEVICE — BLADELESS OBTURATOR: Brand: WECK VISTA

## (undated) DEVICE — MASTISOL ADHESIVE LIQ 2/3ML

## (undated) DEVICE — BLADE,STAINLESS-STEEL,15,STRL,DISPOSABLE: Brand: MEDLINE

## (undated) DEVICE — ELECTRODE NDL S STL 275IN

## (undated) DEVICE — STRIP,CLOSURE,WOUND,MEDI-STRIP,1/2X4: Brand: MEDLINE

## (undated) DEVICE — SYRINGE MED 30ML STD CLR PLAS LUERLOCK TIP N CTRL DISP

## (undated) DEVICE — APPLICATOR MEDICATED 26 CC SOLUTION HI LT ORNG CHLORAPREP

## (undated) DEVICE — GLOVE SURG SZ 7 CRM LTX FREE POLYISOPRENE POLYMER BEAD ANTI

## (undated) DEVICE — SUTURE VICRYL + SZ 3-0 L27IN ABSRB UD L26MM SH 1/2 CIR VCP416H

## (undated) DEVICE — CLOTH SKIN PREP 2% CHG

## (undated) DEVICE — DRAPE,UTILTY,TAPE,15X26, 4EA/PK: Brand: MEDLINE

## (undated) DEVICE — TIP COVER ACCESSORY

## (undated) DEVICE — PROGRASP FORCEPS: Brand: ENDOWRIST

## (undated) DEVICE — DRAPE,EXTREMITY,89X128,STERILE: Brand: MEDLINE

## (undated) DEVICE — TUBING SUCT 12FR MAL ALUM SHFT FN CAP VENT UNIV CONN W/ OBT

## (undated) DEVICE — MARKER,SKIN,WI/RULER AND LABELS: Brand: MEDLINE

## (undated) DEVICE — ENDOSCOPIC KIT CLN SWAB MICROFIBER CLTH SCP CLEANOR DISP

## (undated) DEVICE — GAUZE,SPONGE,4"X4",8PLY,STRL,LF,10/TRAY: Brand: MEDLINE

## (undated) DEVICE — MEDIUM-LARGE CLIP APPLIER: Brand: ENDOWRIST